# Patient Record
Sex: MALE | Race: WHITE | NOT HISPANIC OR LATINO | Employment: OTHER | URBAN - METROPOLITAN AREA
[De-identification: names, ages, dates, MRNs, and addresses within clinical notes are randomized per-mention and may not be internally consistent; named-entity substitution may affect disease eponyms.]

---

## 2017-09-28 ENCOUNTER — GENERIC CONVERSION - ENCOUNTER (OUTPATIENT)
Dept: OTHER | Facility: OTHER | Age: 82
End: 2017-09-28

## 2017-10-02 ENCOUNTER — TRANSCRIBE ORDERS (OUTPATIENT)
Dept: LAB | Facility: MEDICAL CENTER | Age: 82
End: 2017-10-02

## 2017-10-02 ENCOUNTER — ALLSCRIPTS OFFICE VISIT (OUTPATIENT)
Dept: OTHER | Facility: OTHER | Age: 82
End: 2017-10-02

## 2017-10-02 DIAGNOSIS — I10 ESSENTIAL (PRIMARY) HYPERTENSION: ICD-10-CM

## 2017-10-02 DIAGNOSIS — E78.5 HYPERLIPIDEMIA: ICD-10-CM

## 2017-10-03 NOTE — PROGRESS NOTES
Assessment  1  Chronic pain of right hip (719 45,338 29) (M25 551,G89 29)    Plan  Exercise counseling    · Benefits of Exercise/Physical Activity; Status:Complete - Retrospective By Protocol  Authorization;   Done: [id number]  Hip Pain Elicited By Active Abduction Bilaterally    · TraMADol HCl - 50 MG Oral Tablet  Hyperlipidemia    · (1) CBC/ PLT (NO DIFF); Status:Active; Requested YNY:[id number];    · (1) COMPREHENSIVE METABOLIC PANEL; Status:Active; Requested for:02Oct2017;    · (1) LIPID PANEL FASTING W DIRECT LDL REFLEX; Status:Active; Requested  DGN:44CCE1000;   Hypertension    · (1) TSH; Status:Active; Requested NLP:[id number];   Need for influenza vaccination    · Fluzone High-Dose 0 5 ML Intramuscular Suspension Prefilled Syringe  Screening for depression    · *VB-Depression Screening; Status:Complete - Retrospective By Protocol Authorization;    Done: 28ATQ2937 01:00PM  Screening for genitourinary condition    · *VB - Urinary Incontinence Screen (Dx Z13 89 Screen for UI); Status:Complete -  Retrospective By Protocol Authorization;   Done: 42QAZ4615 01:02PM  Special screening for other neurological conditions    · *VB - Fall Risk Assessment  (Dx Z13 89 Screen for Neurologic Disorder);  Status:Complete - Retrospective By Protocol Authorization;   Done: 26MFB0595 01:01PM    Discussion/Summary    I offered Lucian Bellamy an appointment with Dr Jayson Antoine to once again discuss hardware removal  He said he will think about it  He did get his flu vaccine today  I also gave him lab slips to get some updated blood work drawn, however he said if they lab cannot take him right away, he is leaving  The patient, patient's family was counseled regarding instructions for management,-risk factor reductions,-prognosis,-patient and family education,-risks and benefits of treatment options,-importance of compliance with treatment  Possible side effects of new medications were reviewed with the patient/guardian today   The treatment plan was reviewed with the patient/guardian  The patient/guardian understands and agrees with the treatment plan      Chief Complaint  Issac Malik is here today with complaints of R hip pain every day x 3 years  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 and was last seen by Dr Nadine Carcamo in 8/2016  At that time, Issac Malik was complaining about the R hip pain and Dr Nadine Carcamo suggested removing some hardware to see if he had improvement in pain  Issac Malik never had this done  He is here today for the same pain  He did not call Dr Nadine Carcamo since his appointment there 1 year ago  History of Present Illness  HPI: See chief complaint  Review of Systems    Constitutional: no fever-and-no chills  Cardiovascular: no chest pain-and-no palpitations  Respiratory: no shortness of breath  Gastrointestinal: no abdominal pain,-no constipation-and-no diarrhea  Genitourinary: no dysuria-and-no incontinence  Integumentary: chronic skin lesions, appear to be seborrheic keratosis, he says he follows with dermatology  -   The patient presents with complaints of constant episodes of severe bilateral scalp skin lesion, described as painless, raised, brown, black and tan  ROS reviewed  Active Problems  1  Abscess of face (682 0) (L02 01)   2  Arthritis of knee (716 96) (M17 10)   3  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 (440 23,707 9) (I70 25)   4  Atypical chest pain (786 59) (R07 89)   5  Avascular necrosis of hip, right (733 42) (M87 051)   6  Bursitis of hip, right (726 5) (M70 71)   7  Closed intracapsular fracture of femur (820 00) (S72 019A)   8  Decubitus ulcer of right heel, stage 3 (707 07,707 23) (L89 613)   9  Edema (782 3) (R60 9)   10  Exercise counseling (V65 41) (Z71 82)   11  Gout (274 9) (M10 9)   12  Hip Pain Elicited By Active Abduction Bilaterally   13  Hip pain, right (719 45) (M25 551)   14  Hyperlipidemia (272 4) (E78 5)   15  Hypertension (401 9) (I10)   16   Mechanical complication of internal orthopedic device, implant or graft, initial encounter    (996 40) (T84 498A)   17  Need for influenza vaccination (V04 81) (Z23)   18  Painful orthopaedic hardware (996 78) (T84 84XA)   19  Screening for depression (V79 0) (Z13 89)   20  Screening for genitourinary condition (V81 6) (Z13 89)   21  Special screening for other neurological conditions (V80 09) (Z13 89)   22  Stage I Pressure Ulcer (707 21)   23  Stage I Pressure Ulcer Of The Left Heel (707 07)   24  Unstageable pressure ulcer of heel (106 07,814 47) (L89 600)    Past Medical History  1  History of Chronic kidney disease, stage 3 (585 3) (N18 3)   2  History of Fracture Of The Right Hip (820 8)   3  History of Glaucoma screening (V80 1) (Z13 5)  Active Problems And Past Medical History Reviewed: The active problems and past medical history were reviewed and updated today  Family History  Father    1  Family history of Type 2 diabetes mellitus (250 00) (E11 9)  Family History Reviewed: The family history was reviewed and updated today  Social History   · Former smoker (C05 47) (T82 310)   · Never Drank Alcohol   · Patient has living will (V49 89) (Z78 9)   · Retired From Work  The social history was reviewed and updated today  The social history was reviewed and is unchanged  Surgical History  1  History of Cataract Surgery   2  History of Cath Stent 1 Gradient   3  History of Colostomy   4  History of Colostomy Revision   5  History of Hernia Repair   6  History of Hip Surgery   7  History of Pacemaker - Pulse Generator Replacement   8  History of Pacemaker Placement   9  History of PTA Femoral-Popliteal   10  History of Treatment Of Wrist Fracture  Surgical History Reviewed: The surgical history was reviewed and updated today  Current Meds   1  Aspirin 81 MG Oral Tablet Delayed Release; take 1 tablet daily Recorded   2   Lisinopril-Hydrochlorothiazide 20-12 5 MG Oral Tablet; TAKE 1 TABLET DAILY; Therapy: 27Snx1234 to (Last Rx:77Gax2435)  Requested for: 48Eml4636 Ordered   3  Metoprolol Succinate ER 25 MG Oral Tablet Extended Release 24 Hour; take 1 tablet by   mouth once daily; Therapy: 63WAM5596 to (Last Rx:06Sep2017)  Requested for: 21PVR0464 Ordered   4  Pravastatin Sodium 40 MG Oral Tablet; TAKE ONE TABLET BY MOUTH AT BEDTIME; Therapy: 97IVJ4081 to (Last Rx:06Jun2017)  Requested for: 06Jun2017 Ordered   5  TraMADol HCl - 50 MG Oral Tablet; TAKE 1 TABLET Daily PRN for severe pain; Therapy: 29Nfv0270 to (Evaluate:13Sep2016); Last Rx:98Fib1779 Ordered    Allergies  1  No Known Drug Allergies    Vitals   Recorded: 30NPG4197 12:58PM   Temperature 98 3 F   Systolic 692, LUE, Sitting   Diastolic 68, LUE, Sitting   Height 5 ft 6 5 in   Weight 143 lb 8 0 oz   BMI Calculated 22 81   BSA Calculated 1 75     Physical Exam    Constitutional   General appearance: Abnormal   disheveled clothing-and-unkempt appearance  Ears, Nose, Mouth, and Throat   External inspection of ears and nose: Normal     Otoscopic examination: Tympanic membrance translucent with normal light reflex  Canals patent without erythema  Oropharynx: Normal with no erythema, edema, exudate or lesions  Pulmonary   Respiratory effort: No increased work of breathing or signs of respiratory distress  Auscultation of lungs: Clear to auscultation, equal breath sounds bilaterally, no wheezes, no rales, no rhonci  Cardiovascular   Auscultation of heart: Normal rate and rhythm, normal S1 and S2, without murmurs  Lymphatic   Palpation of lymph nodes in neck: No lymphadenopathy  Musculoskeletal patient refused R hip exam because his hip is painful  Skin   Skin and subcutaneous tissue: Normal without rashes or lesions      Psychiatric   Orientation to person, place and time: Normal     Mood and affect: Normal          Results/Data  *VB - Urinary Incontinence Screen (Dx Z13 89 Screen for UI) 36YVZ8943 01:02PM Travis Honey     Test Name Result Flag Reference   Urinary Incontinence Assessment      10/02/17-No urinary incontinence  Falls Risk Assessment (Dx Z13 89 Screen for Neurologic Disorder) 20MJB2666 01:01PM User, Vinis     Test Name Result Flag Reference   Falls Risk      No falls in the past year     *VB - Fall Risk Assessment  (Dx Z13 89 Screen for Neurologic Disorder) 02Oct2017 01:01PM Manuel Saavedra     Test Name Result Flag Reference   Falls Risk      No falls in the past year     PHQ-2 Adult Depression Screening 60QHC1230 01:00PM User, Ahs     Test Name Result Flag Reference   PHQ-2 Adult Depression Score 0     Over the last two weeks, how often have you been bothered by any of the following problems?   Little interest or pleasure in doing things: Not at all - 0  Feeling down, depressed, or hopeless: Not at all - 0   PHQ-2 Adult Depression Screening Negative       *VB-Depression Screening 32ZXF9262 01:00PM Manuel Saavedra     Test Name Result Flag Reference   Depression Scale Result      Depression Screen - Negative For Symptoms       Signatures   Electronically signed by : Barbara Schofield, [facility]; Oct  2 2017  1:50PM EST                       (Author)    Electronically signed by : Susi Feliciano DO; Oct  2 2017  3:58PM EST                       (Author)

## 2018-01-14 VITALS
BODY MASS INDEX: 22.52 KG/M2 | DIASTOLIC BLOOD PRESSURE: 68 MMHG | SYSTOLIC BLOOD PRESSURE: 116 MMHG | HEIGHT: 67 IN | WEIGHT: 143.5 LBS | TEMPERATURE: 96.7 F

## 2018-01-18 NOTE — MISCELLANEOUS
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 (440 23,707 9) (I70 25)   4  Atypical chest pain (786 59) (R07 89)   5  Avascular necrosis of hip, right (733 42) (M87 051)   6  Bursitis of hip, right (726 5) (M70 71)   7  Closed intracapsular fracture of femur (820 00) (S72 019A)   8  Decubitus ulcer of right heel, stage 3 (707 07,707 23) (L89 613)   9  Edema (782 3) (R60 9)   10  Gout (274 9) (M10 9)   11  Hip Pain Elicited By Active Abduction Bilaterally   12  Hip pain, right (719 45) (M25 551)   13  Hyperlipidemia (272 4) (E78 5)   14  Hypertension (401 9) (I10)   15  Mechanical complication of internal orthopedic device, implant or graft, initial encounter    (996 40) (T84 498A)   16  Need for influenza vaccination (V04 81) (Z23)   17  Painful orthopaedic hardware (996 78) (T84 84XA)   18  Screening for genitourinary condition (V81 6) (Z13 89)   19  Stage I Pressure Ulcer (707 21)   20  Stage I Pressure Ulcer Of The Left Heel (707 07)   21  Unstageable pressure ulcer of heel (707 07,707 25) (L89 600)    Current Meds   1  Aspirin 81 MG Oral Tablet Delayed Release; take 1 tablet daily Recorded   2  Lisinopril-Hydrochlorothiazide 20-12 5 MG Oral Tablet; TAKE 1 TABLET DAILY; Therapy: 22Cdc7676 to (Last BP:28AIK1492)  Requested for: 02JMS6333 Ordered   3  Metoprolol Succinate ER 25 MG Oral Tablet Extended Release 24 Hour; take 1 tablet by   mouth once daily; Therapy: 75YOB2362 to (Last QS:37PCA0100)  Requested for: 16QVS8083 Ordered   4  Pravastatin Sodium 40 MG Oral Tablet; TAKE ONE TABLET BY MOUTH AT BEDTIME; Therapy: 24PAR4180 to (Last GD:90YUL4898)  Requested for: 87CEB8129 Ordered   5  TraMADol HCl - 50 MG Oral Tablet; TAKE 1 TABLET Daily PRN for severe pain; Therapy: 29Aug2016 to (Evaluate:40Fnr5336);  Last Rx:29Aug2016 Ordered    Allergies    1   No Known Drug Allergies    Signatures   Electronically signed by : Audrey Avelar, ; Oct 10 2016  1:18PM EST                       (Author)

## 2018-04-27 ENCOUNTER — OFFICE VISIT (OUTPATIENT)
Dept: OBGYN CLINIC | Facility: CLINIC | Age: 83
End: 2018-04-27
Payer: COMMERCIAL

## 2018-04-27 ENCOUNTER — APPOINTMENT (OUTPATIENT)
Dept: RADIOLOGY | Facility: MEDICAL CENTER | Age: 83
End: 2018-04-27
Payer: COMMERCIAL

## 2018-04-27 VITALS
HEART RATE: 73 BPM | SYSTOLIC BLOOD PRESSURE: 146 MMHG | HEIGHT: 69 IN | BODY MASS INDEX: 22.07 KG/M2 | WEIGHT: 149 LBS | DIASTOLIC BLOOD PRESSURE: 68 MMHG

## 2018-04-27 DIAGNOSIS — M25.551 PAIN IN RIGHT HIP: ICD-10-CM

## 2018-04-27 DIAGNOSIS — Z98.890 STATUS POST HIP SURGERY: Primary | ICD-10-CM

## 2018-04-27 PROCEDURE — 73502 X-RAY EXAM HIP UNI 2-3 VIEWS: CPT

## 2018-04-27 PROCEDURE — 99203 OFFICE O/P NEW LOW 30 MIN: CPT | Performed by: ORTHOPAEDIC SURGERY

## 2018-04-27 PROCEDURE — 20610 DRAIN/INJ JOINT/BURSA W/O US: CPT | Performed by: ORTHOPAEDIC SURGERY

## 2018-04-27 RX ORDER — PRAVASTATIN SODIUM 40 MG
1 TABLET ORAL
COMMUNITY
Start: 2014-05-23 | End: 2018-07-03 | Stop reason: SDUPTHER

## 2018-04-27 RX ORDER — LISINOPRIL AND HYDROCHLOROTHIAZIDE 20; 12.5 MG/1; MG/1
1 TABLET ORAL DAILY
COMMUNITY
Start: 2015-04-13 | End: 2018-05-01 | Stop reason: SDUPTHER

## 2018-04-27 RX ORDER — BUPIVACAINE HYDROCHLORIDE 2.5 MG/ML
4 INJECTION, SOLUTION INFILTRATION; PERINEURAL
Status: COMPLETED | OUTPATIENT
Start: 2018-04-27 | End: 2018-04-27

## 2018-04-27 RX ORDER — BETAMETHASONE SODIUM PHOSPHATE AND BETAMETHASONE ACETATE 3; 3 MG/ML; MG/ML
6 INJECTION, SUSPENSION INTRA-ARTICULAR; INTRALESIONAL; INTRAMUSCULAR; SOFT TISSUE
Status: COMPLETED | OUTPATIENT
Start: 2018-04-27 | End: 2018-04-27

## 2018-04-27 RX ORDER — GENTAMICIN SULFATE 1 MG/G
OINTMENT TOPICAL 3 TIMES DAILY
COMMUNITY
End: 2018-08-29 | Stop reason: ALTCHOICE

## 2018-04-27 RX ORDER — METOPROLOL SUCCINATE 25 MG/1
1 TABLET, EXTENDED RELEASE ORAL DAILY
COMMUNITY
Start: 2014-05-23 | End: 2018-05-01 | Stop reason: SDUPTHER

## 2018-04-27 RX ORDER — CEPHALEXIN 250 MG/1
500 CAPSULE ORAL EVERY 8 HOURS SCHEDULED
COMMUNITY
End: 2018-08-29 | Stop reason: ALTCHOICE

## 2018-04-27 RX ADMIN — BUPIVACAINE HYDROCHLORIDE 4 ML: 2.5 INJECTION, SOLUTION INFILTRATION; PERINEURAL at 11:33

## 2018-04-27 RX ADMIN — BETAMETHASONE SODIUM PHOSPHATE AND BETAMETHASONE ACETATE 6 MG: 3; 3 INJECTION, SUSPENSION INTRA-ARTICULAR; INTRALESIONAL; INTRAMUSCULAR; SOFT TISSUE at 11:33

## 2018-04-27 NOTE — PROGRESS NOTES
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 years ago and was scheduled for surgery which was deferred for various reasons  Patient presents for evaluation with radiographs  Patient is still ambulating with a walker  Physical exam  Right hip incisions well healed  No hardware prominence    All movements of the right hip are painful    Imaging  Radiographs show collapse of the femoral head with the screws in the acetabulum    Procedure  Right hip fracture treated by cannulated screws avascular necrosis femoral head with hardware failure     Plan  Ideally the hardware should be removed and patient should undergo total hip arthroplasty  Patient is wife and his wife elected undergo surgery due to his advanced age and time and in agreement with them for this  Right hip joint injection with steroid with a spinal needle  Patient will come back in 3 months and let me know if he wants the screws removed as a temporizing measure  Large joint arthrocentesis  Date/Time: 4/27/2018 11:33 AM  Consent given by: patient and spouse  Site marked: site marked  Timeout: Immediately prior to procedure a time out was called to verify the correct patient, procedure, equipment, support staff and site/side marked as required   Supporting Documentation  Indications: pain and diagnostic evaluation   Procedure Details  Location: hip - R hip joint  Preparation: Patient was prepped and draped in the usual sterile fashion  Needle gauge: spinal   Ultrasound guidance: no  Approach: anterolateral  Medications administered: 4 mL bupivacaine 0 25 %; 6 mg betamethasone acetate-betamethasone sodium phosphate 6 (3-3) mg/mL    Patient tolerance: patient tolerated the procedure well with no immediate complications  Dressing:  Sterile dressing applied

## 2018-05-01 DIAGNOSIS — I10 ESSENTIAL HYPERTENSION: Primary | ICD-10-CM

## 2018-05-01 RX ORDER — LISINOPRIL AND HYDROCHLOROTHIAZIDE 20; 12.5 MG/1; MG/1
1 TABLET ORAL DAILY
Qty: 30 TABLET | Refills: 5 | Status: ON HOLD | OUTPATIENT
Start: 2018-05-01 | End: 2018-10-27

## 2018-05-01 RX ORDER — METOPROLOL SUCCINATE 25 MG/1
25 TABLET, EXTENDED RELEASE ORAL DAILY
Qty: 30 TABLET | Refills: 5 | Status: SHIPPED | OUTPATIENT
Start: 2018-05-01 | End: 2018-10-09 | Stop reason: SDUPTHER

## 2018-05-02 ENCOUNTER — TELEPHONE (OUTPATIENT)
Dept: OBGYN CLINIC | Facility: HOSPITAL | Age: 83
End: 2018-05-02

## 2018-05-02 DIAGNOSIS — Z87.81 S/P RIGHT HIP FRACTURE: Primary | ICD-10-CM

## 2018-05-02 RX ORDER — ACETAMINOPHEN AND CODEINE PHOSPHATE 300; 15 MG/1; MG/1
1 TABLET ORAL 3 TIMES DAILY PRN
Qty: 30 TABLET | Refills: 0 | Status: SHIPPED | OUTPATIENT
Start: 2018-05-02 | End: 2018-08-29 | Stop reason: ALTCHOICE

## 2018-05-02 NOTE — TELEPHONE ENCOUNTER
Patient calling to see if Dr Karin Moser can RX something for his pain to the pharmacy on file  Veronica Freeman

## 2018-05-02 NOTE — TELEPHONE ENCOUNTER
Tylenol No   2 called into the pharmacy  If patient needs more pain meds he  contact me either myself or his PCP

## 2018-05-02 NOTE — TELEPHONE ENCOUNTER
Patient sees Dr Elyse Mehta  He sees him for his hip  He left a message stating he would like a call back regarding his pain

## 2018-07-03 ENCOUNTER — APPOINTMENT (OUTPATIENT)
Dept: RADIOLOGY | Facility: MEDICAL CENTER | Age: 83
End: 2018-07-03
Payer: COMMERCIAL

## 2018-07-03 ENCOUNTER — OFFICE VISIT (OUTPATIENT)
Dept: OBGYN CLINIC | Facility: CLINIC | Age: 83
End: 2018-07-03
Payer: COMMERCIAL

## 2018-07-03 DIAGNOSIS — Z87.81 S/P RIGHT HIP FRACTURE: ICD-10-CM

## 2018-07-03 DIAGNOSIS — Z98.890 STATUS POST HIP SURGERY: ICD-10-CM

## 2018-07-03 DIAGNOSIS — E78.2 MIXED HYPERLIPIDEMIA: Primary | ICD-10-CM

## 2018-07-03 DIAGNOSIS — M87.00 AVN (AVASCULAR NECROSIS OF BONE) (HCC): ICD-10-CM

## 2018-07-03 DIAGNOSIS — M87.00 AVN (AVASCULAR NECROSIS OF BONE) (HCC): Primary | ICD-10-CM

## 2018-07-03 PROCEDURE — 73502 X-RAY EXAM HIP UNI 2-3 VIEWS: CPT

## 2018-07-03 PROCEDURE — 99213 OFFICE O/P EST LOW 20 MIN: CPT | Performed by: ORTHOPAEDIC SURGERY

## 2018-07-03 RX ORDER — PRAVASTATIN SODIUM 40 MG
TABLET ORAL
Qty: 30 TABLET | Refills: 0 | Status: SHIPPED | OUTPATIENT
Start: 2018-07-03 | End: 2018-08-02 | Stop reason: SDUPTHER

## 2018-07-03 NOTE — PROGRESS NOTES
Chief Complaint  Right hip pain    History Of Presenting Illness  Cale Brink 5/13/1921 presents with increasing right hip pain  Patient underwent open reduction internal fixation right hip fracture with cannulated screws April 17th, 2014  Since then fractures collapsed and the screws have penetrated into the hip joint  Patient was scheduled multiple times for screw removal but for whatever reason this has been cancelled on multiple occasions      Current Medications  Current Outpatient Prescriptions   Medication Sig Dispense Refill    acetaminophen-codeine (TYLENOL #2) 300-15 MG per tablet Take 1 tablet by mouth 3 (three) times a day as needed for moderate pain for up to 21 doses 30 tablet 0    aspirin 81 MG tablet Take 1 tablet by mouth daily      cephalexin (KEFLEX) 250 mg capsule Take 500 mg by mouth every 8 (eight) hours      gentamicin (GARAMYCIN) 0 1 % ointment Apply topically 3 (three) times a day      lisinopril-hydrochlorothiazide (PRINZIDE,ZESTORETIC) 20-12 5 MG per tablet Take 1 tablet by mouth daily 30 tablet 5    metoprolol succinate (TOPROL-XL) 25 mg 24 hr tablet Take 1 tablet (25 mg total) by mouth daily 30 tablet 5    pravastatin (PRAVACHOL) 40 mg tablet Take 1 tablet by mouth       No current facility-administered medications for this visit  Current Problems    Active Problems: There are no active problems to display for this patient          Review of Systems:    General: negative for - chills, fatigue, fever,  weight gain or weight loss  Psychological: negative for - anxiety, behavioral disorder, concentration difficulties    Past Medical History:   Past Medical History:   Diagnosis Date    Cancer ([address] )     skin    High cholesterol     Hypertension        Past Surgical History:   Past Surgical History:   Procedure Laterality Date    A-V CARDIAC PACEMAKER INSERTION      CATARACT EXTRACTION EXTRACAPSULAR W/ INTRAOCULAR LENS IMPLANTATION Bilateral     HEMORRHOID SURGERY      HERNIA REPAIR      bilat inguinal    JOINT REPLACEMENT      R hip    SKIN CANCER EXCISION         Family History:  Family history reviewed and non-contributory  Family History   Problem Relation Age of Onset    No Known Problems Mother     No Known Problems Father        Social History:  Social History     Social History    Marital status: /Civil Union     Spouse name: N/A    Number of children: N/A    Years of education: N/A     Social History Main Topics    Smoking status: Former Smoker    Smokeless tobacco: Never Used    Alcohol use No    Drug use: No    Sexual activity: Not on file     Other Topics Concern    Not on file     Social History Narrative    No narrative on file       Allergies: Allergies no known allergies        Physical ExaminationThere were no vitals taken for this visit  Gen: Alert and oriented to person, place, time  HEENT: EOMI, eyes clear, moist mucus membranes, hearing intact  Respiratory: Bilateral chest rise   No audible wheezing found  Cardiovascular: Regular Rate and Rhythm  Abdomen: soft nontender/nondistended    Orthopedic Exam  Right hip incisions healed  All movements of the right hip painful          Impression  Right hip pain due to avascular necrosis collapsed femoral head and prominent hardware in the hip joint        Plan    Discussed treatment with the patient and his wife  Ideally patient needs removal of hardware and hip replacement arthroplasty  At 89 I am not sure whether he is strong enough to undergo this procedure  Another  option is at least removal of the hardware which is prominent into the joint  Patient will talk about options with the son and PCP and let me know in 1-2 weeks    Rashad Medina MD        Portions of the record may have been created with voice recognition software   Occasional wrong word or "sound a like" substitutions may have occurred due to the inherent limitations of voice recognition software   Read the chart carefully and recognize, using context, where substitutions have occurred

## 2018-08-02 DIAGNOSIS — E78.2 MIXED HYPERLIPIDEMIA: ICD-10-CM

## 2018-08-02 RX ORDER — PRAVASTATIN SODIUM 40 MG
TABLET ORAL
Qty: 30 TABLET | Refills: 0 | Status: SHIPPED | OUTPATIENT
Start: 2018-08-02 | End: 2018-08-30 | Stop reason: SDUPTHER

## 2018-08-29 ENCOUNTER — OFFICE VISIT (OUTPATIENT)
Dept: FAMILY MEDICINE CLINIC | Facility: CLINIC | Age: 83
End: 2018-08-29
Payer: COMMERCIAL

## 2018-08-29 VITALS
BODY MASS INDEX: 20.73 KG/M2 | DIASTOLIC BLOOD PRESSURE: 56 MMHG | SYSTOLIC BLOOD PRESSURE: 118 MMHG | WEIGHT: 140 LBS | HEIGHT: 69 IN

## 2018-08-29 DIAGNOSIS — L23.0 NICKEL ALLERGY, CURRENT REACTION: Primary | ICD-10-CM

## 2018-08-29 PROCEDURE — 3725F SCREEN DEPRESSION PERFORMED: CPT | Performed by: FAMILY MEDICINE

## 2018-08-29 PROCEDURE — 1101F PT FALLS ASSESS-DOCD LE1/YR: CPT | Performed by: FAMILY MEDICINE

## 2018-08-29 PROCEDURE — 99214 OFFICE O/P EST MOD 30 MIN: CPT | Performed by: FAMILY MEDICINE

## 2018-08-29 RX ORDER — TRIAMCINOLONE ACETONIDE 1 MG/G
CREAM TOPICAL 2 TIMES DAILY
Qty: 15 G | Refills: 0 | Status: SHIPPED | OUTPATIENT
Start: 2018-08-29 | End: 2018-10-05

## 2018-08-29 NOTE — PROGRESS NOTES
Assessment/Plan:    No problem-specific Assessment & Plan notes found for this encounter  Diagnoses and all orders for this visit:    Nickel allergy, current reaction  -     triamcinolone (KENALOG) 0 1 % cream; Apply topically 2 (two) times a day          Subjective:      Patient ID: Sandie Alcantar is a 80 y o  male  Rash right wrist under wrist watch  Nickel allergy      Rash   Pertinent negatives include no cough, diarrhea, fatigue, fever, shortness of breath or vomiting  The following portions of the patient's history were reviewed and updated as appropriate:   He  has a past medical history of Cancer ([address] ); High cholesterol; and Hypertension  He There are no active problems to display for this patient  He  has a past surgical history that includes A-V cardiac pacemaker insertion; Skin cancer excision; Hemorrhoid surgery; Joint replacement; Cataract extraction, extracapsular w/ intraocular lens implant (Bilateral); and Hernia repair  His family history includes No Known Problems in his father and mother  He  reports that he has quit smoking  He has never used smokeless tobacco  He reports that he does not drink alcohol or use drugs  Current Outpatient Prescriptions   Medication Sig Dispense Refill    aspirin 81 MG tablet Take 1 tablet by mouth daily      lisinopril-hydrochlorothiazide (PRINZIDE,ZESTORETIC) 20-12 5 MG per tablet Take 1 tablet by mouth daily 30 tablet 5    metoprolol succinate (TOPROL-XL) 25 mg 24 hr tablet Take 1 tablet (25 mg total) by mouth daily 30 tablet 5    pravastatin (PRAVACHOL) 40 mg tablet TAKE ONE TABLET BY MOUTH AT BEDTIME 30 tablet 0    triamcinolone (KENALOG) 0 1 % cream Apply topically 2 (two) times a day 15 g 0     No current facility-administered medications for this visit        Current Outpatient Prescriptions on File Prior to Visit   Medication Sig    aspirin 81 MG tablet Take 1 tablet by mouth daily    lisinopril-hydrochlorothiazide (PRINZIDE,ZESTORETIC) 20-12 5 MG per tablet Take 1 tablet by mouth daily    metoprolol succinate (TOPROL-XL) 25 mg 24 hr tablet Take 1 tablet (25 mg total) by mouth daily    pravastatin (PRAVACHOL) 40 mg tablet TAKE ONE TABLET BY MOUTH AT BEDTIME    [DISCONTINUED] acetaminophen-codeine (TYLENOL #2) 300-15 MG per tablet Take 1 tablet by mouth 3 (three) times a day as needed for moderate pain for up to 21 doses (Patient not taking: Reported on 8/29/2018 )    [DISCONTINUED] cephalexin (KEFLEX) 250 mg capsule Take 500 mg by mouth every 8 (eight) hours    [DISCONTINUED] gentamicin (GARAMYCIN) 0 1 % ointment Apply topically 3 (three) times a day     No current facility-administered medications on file prior to visit  He has No Known Allergies       Review of Systems   Constitutional: Negative for activity change, appetite change, diaphoresis, fatigue and fever  HENT: Negative  Eyes: Negative  Respiratory: Negative for apnea, cough, chest tightness, shortness of breath and wheezing  Cardiovascular: Negative for chest pain, palpitations and leg swelling  Gastrointestinal: Negative for abdominal distention, abdominal pain, anal bleeding, constipation, diarrhea, nausea and vomiting  Endocrine: Negative for cold intolerance, heat intolerance, polydipsia, polyphagia and polyuria  Genitourinary: Negative for difficulty urinating, dysuria, flank pain, hematuria and urgency  Musculoskeletal: Negative for arthralgias, back pain, gait problem, joint swelling and myalgias  Skin: Positive for rash  Negative for color change and wound  Allergic/Immunologic: Negative for environmental allergies, food allergies and immunocompromised state  Neurological: Negative for dizziness, seizures, syncope, speech difficulty, numbness and headaches  Hematological: Negative for adenopathy  Does not bruise/bleed easily     Psychiatric/Behavioral: Negative for agitation, behavioral problems, hallucinations, sleep disturbance and suicidal ideas  Objective:      /56 (BP Location: Left arm, Patient Position: Sitting, Cuff Size: Standard)   Ht 5' 9" (1 753 m)   Wt 63 5 kg (140 lb)   BMI 20 67 kg/m²          Physical Exam   Constitutional: He is oriented to person, place, and time  He appears well-developed and well-nourished  No distress  HENT:   Head: Normocephalic  Right Ear: External ear normal    Left Ear: External ear normal    Nose: Nose normal    Mouth/Throat: Oropharynx is clear and moist    Eyes: Conjunctivae and EOM are normal  Pupils are equal, round, and reactive to light  Right eye exhibits no discharge  Left eye exhibits no discharge  No scleral icterus  Neck: Normal range of motion  No tracheal deviation present  No thyromegaly present  Cardiovascular: Normal rate, regular rhythm and normal heart sounds  Exam reveals no gallop and no friction rub  No murmur heard  Pulmonary/Chest: Effort normal and breath sounds normal  No respiratory distress  He has no wheezes  Abdominal: Soft  Bowel sounds are normal  He exhibits no mass  There is no tenderness  There is no guarding  Musculoskeletal: He exhibits no edema or deformity  Lymphadenopathy:     He has no cervical adenopathy  Neurological: He is alert and oriented to person, place, and time  No cranial nerve deficit  Skin: Skin is warm and dry  Rash noted  He is not diaphoretic  No erythema  Psychiatric: He has a normal mood and affect   Thought content normal

## 2018-08-30 DIAGNOSIS — E78.2 MIXED HYPERLIPIDEMIA: ICD-10-CM

## 2018-08-30 RX ORDER — PRAVASTATIN SODIUM 40 MG
TABLET ORAL
Qty: 30 TABLET | Refills: 0 | Status: SHIPPED | OUTPATIENT
Start: 2018-08-30 | End: 2018-09-15 | Stop reason: SDUPTHER

## 2018-09-15 DIAGNOSIS — E78.2 MIXED HYPERLIPIDEMIA: ICD-10-CM

## 2018-09-17 RX ORDER — PRAVASTATIN SODIUM 40 MG
TABLET ORAL
Qty: 30 TABLET | Refills: 0 | Status: SHIPPED | OUTPATIENT
Start: 2018-09-17 | End: 2018-10-09 | Stop reason: SDUPTHER

## 2018-10-04 ENCOUNTER — APPOINTMENT (OUTPATIENT)
Dept: LAB | Facility: MEDICAL CENTER | Age: 83
DRG: 603 | End: 2018-10-04
Payer: COMMERCIAL

## 2018-10-04 ENCOUNTER — OFFICE VISIT (OUTPATIENT)
Dept: FAMILY MEDICINE CLINIC | Facility: CLINIC | Age: 83
End: 2018-10-04
Payer: COMMERCIAL

## 2018-10-04 VITALS
HEIGHT: 69 IN | DIASTOLIC BLOOD PRESSURE: 64 MMHG | SYSTOLIC BLOOD PRESSURE: 124 MMHG | BODY MASS INDEX: 21.48 KG/M2 | WEIGHT: 145 LBS

## 2018-10-04 DIAGNOSIS — L03.119 CELLULITIS OF LOWER EXTREMITY, UNSPECIFIED LATERALITY: Primary | ICD-10-CM

## 2018-10-04 DIAGNOSIS — R60.9 DEPENDENT EDEMA: ICD-10-CM

## 2018-10-04 DIAGNOSIS — Z23 NEED FOR INFLUENZA VACCINATION: ICD-10-CM

## 2018-10-04 LAB
ANION GAP SERPL CALCULATED.3IONS-SCNC: 8 MMOL/L (ref 4–13)
BUN SERPL-MCNC: 50 MG/DL (ref 5–25)
CALCIUM SERPL-MCNC: 9.2 MG/DL (ref 8.3–10.1)
CHLORIDE SERPL-SCNC: 109 MMOL/L (ref 100–108)
CO2 SERPL-SCNC: 27 MMOL/L (ref 21–32)
CREAT SERPL-MCNC: 1.58 MG/DL (ref 0.6–1.3)
GFR SERPL CREATININE-BSD FRML MDRD: 36 ML/MIN/1.73SQ M
GLUCOSE SERPL-MCNC: 88 MG/DL (ref 65–140)
NT-PROBNP SERPL-MCNC: 2648 PG/ML
POTASSIUM SERPL-SCNC: 4.3 MMOL/L (ref 3.5–5.3)
SODIUM SERPL-SCNC: 144 MMOL/L (ref 136–145)
TSH SERPL DL<=0.05 MIU/L-ACNC: 2.54 UIU/ML (ref 0.36–3.74)

## 2018-10-04 PROCEDURE — G0008 ADMIN INFLUENZA VIRUS VAC: HCPCS

## 2018-10-04 PROCEDURE — 99214 OFFICE O/P EST MOD 30 MIN: CPT | Performed by: FAMILY MEDICINE

## 2018-10-04 PROCEDURE — 83880 ASSAY OF NATRIURETIC PEPTIDE: CPT | Performed by: FAMILY MEDICINE

## 2018-10-04 PROCEDURE — 36415 COLL VENOUS BLD VENIPUNCTURE: CPT | Performed by: FAMILY MEDICINE

## 2018-10-04 PROCEDURE — 90662 IIV NO PRSV INCREASED AG IM: CPT

## 2018-10-04 PROCEDURE — 80048 BASIC METABOLIC PNL TOTAL CA: CPT | Performed by: FAMILY MEDICINE

## 2018-10-04 PROCEDURE — 84443 ASSAY THYROID STIM HORMONE: CPT | Performed by: FAMILY MEDICINE

## 2018-10-04 RX ORDER — CEPHALEXIN 500 MG/1
500 CAPSULE ORAL EVERY 6 HOURS SCHEDULED
Qty: 40 CAPSULE | Refills: 0 | Status: SHIPPED | OUTPATIENT
Start: 2018-10-04 | End: 2018-10-08 | Stop reason: HOSPADM

## 2018-10-04 NOTE — PROGRESS NOTES
Assessment/Plan:  Draw a BMP/BNP/TSH and after these results have arrived patient may need loop diuretic patient was encouraged not to consume sodium and to elevate his legs on a recliner chair during the day    No problem-specific Assessment & Plan notes found for this encounter  Diagnoses and all orders for this visit:    Cellulitis of lower extremity, unspecified laterality  -     cephalexin (KEFLEX) 500 mg capsule; Take 1 capsule (500 mg total) by mouth every 6 (six) hours for 10 days    Dependent edema  -     Basic metabolic panel  -     NT-BNP PRO  -     TSH, 3rd generation with Free T4 reflex          Subjective:      Patient ID: Preston Mata is a 80 y o  male  Recent swelling of lower extremities yesterday patient was at the podiatrist he asked if we could squeeze min to are scheduled this week because of dependent edema he has +3 pitting edema up to the mid calf he has redness and induration of the skin a few areas of open blistering he does not elevate his legs during the day he sits with his legs down and therefore has a dependent edema he does take lisinopril hydrochlorothiazide but might need a loop diuretic he has no orthopnea or other signs of congestive heart failure but he does have a pacemaker        The following portions of the patient's history were reviewed and updated as appropriate:   He  has a past medical history of Cancer ([address] ); High cholesterol; and Hypertension  He There are no active problems to display for this patient  He  has a past surgical history that includes A-V cardiac pacemaker insertion; Skin cancer excision; Hemorrhoid surgery; Joint replacement; Cataract extraction, extracapsular w/ intraocular lens implant (Bilateral); and Hernia repair  His family history includes No Known Problems in his father and mother  He  reports that he has quit smoking  He has never used smokeless tobacco  He reports that he does not drink alcohol or use drugs    Current Outpatient Prescriptions   Medication Sig Dispense Refill    aspirin 81 MG tablet Take 1 tablet by mouth daily      lisinopril-hydrochlorothiazide (PRINZIDE,ZESTORETIC) 20-12 5 MG per tablet Take 1 tablet by mouth daily 30 tablet 5    metoprolol succinate (TOPROL-XL) 25 mg 24 hr tablet Take 1 tablet (25 mg total) by mouth daily 30 tablet 5    pravastatin (PRAVACHOL) 40 mg tablet TAKE ONE TABLET BY MOUTH AT BEDTIME 30 tablet 0    triamcinolone (KENALOG) 0 1 % cream Apply topically 2 (two) times a day 15 g 0    cephalexin (KEFLEX) 500 mg capsule Take 1 capsule (500 mg total) by mouth every 6 (six) hours for 10 days 40 capsule 0     No current facility-administered medications for this visit  Current Outpatient Prescriptions on File Prior to Visit   Medication Sig    aspirin 81 MG tablet Take 1 tablet by mouth daily    lisinopril-hydrochlorothiazide (PRINZIDE,ZESTORETIC) 20-12 5 MG per tablet Take 1 tablet by mouth daily    metoprolol succinate (TOPROL-XL) 25 mg 24 hr tablet Take 1 tablet (25 mg total) by mouth daily    pravastatin (PRAVACHOL) 40 mg tablet TAKE ONE TABLET BY MOUTH AT BEDTIME    triamcinolone (KENALOG) 0 1 % cream Apply topically 2 (two) times a day     No current facility-administered medications on file prior to visit  He has No Known Allergies       Review of Systems   Constitutional: Negative for activity change, appetite change, diaphoresis, fatigue and fever  HENT: Negative  Eyes: Negative  Respiratory: Negative for apnea, cough, chest tightness, shortness of breath and wheezing  Cardiovascular: Positive for leg swelling  Negative for chest pain and palpitations  Gastrointestinal: Negative for abdominal distention, abdominal pain, anal bleeding, constipation, diarrhea, nausea and vomiting  Endocrine: Negative for cold intolerance, heat intolerance, polydipsia, polyphagia and polyuria     Genitourinary: Negative for difficulty urinating, dysuria, flank pain, hematuria and urgency  Musculoskeletal: Negative for arthralgias, back pain, gait problem, joint swelling and myalgias  Skin: Negative for color change, rash and wound  Allergic/Immunologic: Negative for environmental allergies, food allergies and immunocompromised state  Neurological: Negative for dizziness, seizures, syncope, speech difficulty, numbness and headaches  Hematological: Negative for adenopathy  Does not bruise/bleed easily  Psychiatric/Behavioral: Negative for agitation, behavioral problems, hallucinations, sleep disturbance and suicidal ideas  Objective:      /64 (BP Location: Left arm, Patient Position: Sitting, Cuff Size: Standard)   Ht 5' 9" (1 753 m)   Wt 65 8 kg (145 lb)   BMI 21 41 kg/m²          Physical Exam   Musculoskeletal: He exhibits edema

## 2018-10-05 ENCOUNTER — APPOINTMENT (EMERGENCY)
Dept: RADIOLOGY | Facility: HOSPITAL | Age: 83
DRG: 603 | End: 2018-10-05
Payer: COMMERCIAL

## 2018-10-05 ENCOUNTER — APPOINTMENT (EMERGENCY)
Dept: ULTRASOUND IMAGING | Facility: HOSPITAL | Age: 83
DRG: 603 | End: 2018-10-05
Payer: COMMERCIAL

## 2018-10-05 ENCOUNTER — HOSPITAL ENCOUNTER (INPATIENT)
Facility: HOSPITAL | Age: 83
LOS: 3 days | Discharge: HOME HEALTH | DRG: 603 | End: 2018-10-08
Attending: EMERGENCY MEDICINE | Admitting: INTERNAL MEDICINE
Payer: COMMERCIAL

## 2018-10-05 DIAGNOSIS — R60.0 BILATERAL LEG EDEMA: ICD-10-CM

## 2018-10-05 DIAGNOSIS — R79.89 ELEVATED BRAIN NATRIURETIC PEPTIDE (BNP) LEVEL: Primary | ICD-10-CM

## 2018-10-05 DIAGNOSIS — L03.115 CELLULITIS OF RIGHT LOWER EXTREMITY: ICD-10-CM

## 2018-10-05 DIAGNOSIS — S37.009A KIDNEY INJURY: ICD-10-CM

## 2018-10-05 PROBLEM — Z86.79 HX OF CARDIAC MURMUR: Chronic | Status: ACTIVE | Noted: 2018-10-05

## 2018-10-05 PROBLEM — N17.9 AKI (ACUTE KIDNEY INJURY) (HCC): Status: ACTIVE | Noted: 2018-10-05

## 2018-10-05 PROBLEM — L03.90 CELLULITIS: Status: ACTIVE | Noted: 2018-10-05

## 2018-10-05 LAB
ALBUMIN SERPL BCP-MCNC: 3.2 G/DL (ref 3.5–5)
ALP SERPL-CCNC: 94 U/L (ref 46–116)
ALT SERPL W P-5'-P-CCNC: 13 U/L (ref 12–78)
ANION GAP SERPL CALCULATED.3IONS-SCNC: 7 MMOL/L (ref 4–13)
APTT PPP: 32 SECONDS (ref 24–36)
AST SERPL W P-5'-P-CCNC: 18 U/L (ref 5–45)
ATRIAL RATE: 75 BPM
BACTERIA UR QL AUTO: NORMAL /HPF
BASOPHILS # BLD AUTO: 0.05 THOUSANDS/ΜL (ref 0–0.1)
BASOPHILS NFR BLD AUTO: 1 % (ref 0–1)
BILIRUB SERPL-MCNC: 0.5 MG/DL (ref 0.2–1)
BILIRUB UR QL STRIP: NEGATIVE
BUN SERPL-MCNC: 47 MG/DL (ref 5–25)
CALCIUM SERPL-MCNC: 8.8 MG/DL (ref 8.3–10.1)
CHLORIDE SERPL-SCNC: 110 MMOL/L (ref 100–108)
CLARITY UR: CLEAR
CO2 SERPL-SCNC: 30 MMOL/L (ref 21–32)
COLOR UR: YELLOW
CREAT SERPL-MCNC: 1.53 MG/DL (ref 0.6–1.3)
EOSINOPHIL # BLD AUTO: 0.14 THOUSAND/ΜL (ref 0–0.61)
EOSINOPHIL NFR BLD AUTO: 2 % (ref 0–6)
ERYTHROCYTE [DISTWIDTH] IN BLOOD BY AUTOMATED COUNT: 14 % (ref 11.6–15.1)
GFR SERPL CREATININE-BSD FRML MDRD: 38 ML/MIN/1.73SQ M
GLUCOSE SERPL-MCNC: 84 MG/DL (ref 65–140)
GLUCOSE UR STRIP-MCNC: NEGATIVE MG/DL
HCT VFR BLD AUTO: 41.1 % (ref 36.5–49.3)
HGB BLD-MCNC: 12.8 G/DL (ref 12–17)
HGB UR QL STRIP.AUTO: ABNORMAL
IMM GRANULOCYTES # BLD AUTO: 0.02 THOUSAND/UL (ref 0–0.2)
IMM GRANULOCYTES NFR BLD AUTO: 0 % (ref 0–2)
INR PPP: 1.15 (ref 0.86–1.17)
KETONES UR STRIP-MCNC: NEGATIVE MG/DL
LEUKOCYTE ESTERASE UR QL STRIP: NEGATIVE
LYMPHOCYTES # BLD AUTO: 0.83 THOUSANDS/ΜL (ref 0.6–4.47)
LYMPHOCYTES NFR BLD AUTO: 12 % (ref 14–44)
MAGNESIUM SERPL-MCNC: 2.1 MG/DL (ref 1.6–2.6)
MCH RBC QN AUTO: 29.6 PG (ref 26.8–34.3)
MCHC RBC AUTO-ENTMCNC: 31.1 G/DL (ref 31.4–37.4)
MCV RBC AUTO: 95 FL (ref 82–98)
MONOCYTES # BLD AUTO: 0.78 THOUSAND/ΜL (ref 0.17–1.22)
MONOCYTES NFR BLD AUTO: 11 % (ref 4–12)
NEUTROPHILS # BLD AUTO: 5.04 THOUSANDS/ΜL (ref 1.85–7.62)
NEUTS SEG NFR BLD AUTO: 74 % (ref 43–75)
NITRITE UR QL STRIP: NEGATIVE
NON-SQ EPI CELLS URNS QL MICRO: NORMAL /HPF
NRBC BLD AUTO-RTO: 0 /100 WBCS
NT-PROBNP SERPL-MCNC: 2050 PG/ML
P AXIS: 87 DEGREES
PH UR STRIP.AUTO: 5.5 [PH] (ref 4.5–8)
PLATELET # BLD AUTO: 227 THOUSANDS/UL (ref 149–390)
PLATELET # BLD AUTO: 239 THOUSANDS/UL (ref 149–390)
PMV BLD AUTO: 11.1 FL (ref 8.9–12.7)
PMV BLD AUTO: 11.5 FL (ref 8.9–12.7)
POTASSIUM SERPL-SCNC: 4.1 MMOL/L (ref 3.5–5.3)
PR INTERVAL: 174 MS
PROT SERPL-MCNC: 6.6 G/DL (ref 6.4–8.2)
PROT UR STRIP-MCNC: NEGATIVE MG/DL
PROTHROMBIN TIME: 14.2 SECONDS (ref 11.8–14.2)
QRS AXIS: -64 DEGREES
QRSD INTERVAL: 152 MS
QT INTERVAL: 450 MS
QTC INTERVAL: 502 MS
RBC # BLD AUTO: 4.32 MILLION/UL (ref 3.88–5.62)
RBC #/AREA URNS AUTO: NORMAL /HPF
SODIUM SERPL-SCNC: 147 MMOL/L (ref 136–145)
SP GR UR STRIP.AUTO: 1.01 (ref 1–1.03)
T WAVE AXIS: 111 DEGREES
TROPONIN I SERPL-MCNC: 0.04 NG/ML
TSH SERPL DL<=0.05 MIU/L-ACNC: 3.72 UIU/ML (ref 0.36–3.74)
UROBILINOGEN UR QL STRIP.AUTO: 0.2 E.U./DL
VENTRICULAR RATE: 75 BPM
WBC # BLD AUTO: 6.86 THOUSAND/UL (ref 4.31–10.16)
WBC #/AREA URNS AUTO: NORMAL /HPF

## 2018-10-05 PROCEDURE — 85049 AUTOMATED PLATELET COUNT: CPT | Performed by: INTERNAL MEDICINE

## 2018-10-05 PROCEDURE — 83735 ASSAY OF MAGNESIUM: CPT | Performed by: PHYSICIAN ASSISTANT

## 2018-10-05 PROCEDURE — 83880 ASSAY OF NATRIURETIC PEPTIDE: CPT | Performed by: PHYSICIAN ASSISTANT

## 2018-10-05 PROCEDURE — 93005 ELECTROCARDIOGRAM TRACING: CPT

## 2018-10-05 PROCEDURE — 96375 TX/PRO/DX INJ NEW DRUG ADDON: CPT

## 2018-10-05 PROCEDURE — 99285 EMERGENCY DEPT VISIT HI MDM: CPT

## 2018-10-05 PROCEDURE — 84443 ASSAY THYROID STIM HORMONE: CPT | Performed by: INTERNAL MEDICINE

## 2018-10-05 PROCEDURE — 36415 COLL VENOUS BLD VENIPUNCTURE: CPT | Performed by: PHYSICIAN ASSISTANT

## 2018-10-05 PROCEDURE — 96374 THER/PROPH/DIAG INJ IV PUSH: CPT

## 2018-10-05 PROCEDURE — 80053 COMPREHEN METABOLIC PANEL: CPT | Performed by: PHYSICIAN ASSISTANT

## 2018-10-05 PROCEDURE — 85730 THROMBOPLASTIN TIME PARTIAL: CPT | Performed by: PHYSICIAN ASSISTANT

## 2018-10-05 PROCEDURE — 71045 X-RAY EXAM CHEST 1 VIEW: CPT

## 2018-10-05 PROCEDURE — 93971 EXTREMITY STUDY: CPT

## 2018-10-05 PROCEDURE — 85610 PROTHROMBIN TIME: CPT | Performed by: PHYSICIAN ASSISTANT

## 2018-10-05 PROCEDURE — 84484 ASSAY OF TROPONIN QUANT: CPT | Performed by: PHYSICIAN ASSISTANT

## 2018-10-05 PROCEDURE — 93010 ELECTROCARDIOGRAM REPORT: CPT | Performed by: INTERNAL MEDICINE

## 2018-10-05 PROCEDURE — 81001 URINALYSIS AUTO W/SCOPE: CPT | Performed by: PHYSICIAN ASSISTANT

## 2018-10-05 PROCEDURE — 99223 1ST HOSP IP/OBS HIGH 75: CPT | Performed by: INTERNAL MEDICINE

## 2018-10-05 PROCEDURE — 93971 EXTREMITY STUDY: CPT | Performed by: SURGERY

## 2018-10-05 PROCEDURE — 85025 COMPLETE CBC W/AUTO DIFF WBC: CPT | Performed by: PHYSICIAN ASSISTANT

## 2018-10-05 RX ORDER — PRAVASTATIN SODIUM 40 MG
40 TABLET ORAL
Status: DISCONTINUED | OUTPATIENT
Start: 2018-10-05 | End: 2018-10-08 | Stop reason: HOSPADM

## 2018-10-05 RX ORDER — FUROSEMIDE 10 MG/ML
40 INJECTION INTRAMUSCULAR; INTRAVENOUS ONCE
Status: COMPLETED | OUTPATIENT
Start: 2018-10-05 | End: 2018-10-05

## 2018-10-05 RX ORDER — HEPARIN SODIUM 5000 [USP'U]/ML
5000 INJECTION, SOLUTION INTRAVENOUS; SUBCUTANEOUS EVERY 8 HOURS SCHEDULED
Status: DISCONTINUED | OUTPATIENT
Start: 2018-10-05 | End: 2018-10-08 | Stop reason: HOSPADM

## 2018-10-05 RX ORDER — SENNOSIDES 8.6 MG
1 TABLET ORAL DAILY
Status: DISCONTINUED | OUTPATIENT
Start: 2018-10-06 | End: 2018-10-08 | Stop reason: HOSPADM

## 2018-10-05 RX ORDER — ONDANSETRON 2 MG/ML
4 INJECTION INTRAMUSCULAR; INTRAVENOUS EVERY 6 HOURS PRN
Status: DISCONTINUED | OUTPATIENT
Start: 2018-10-05 | End: 2018-10-08 | Stop reason: HOSPADM

## 2018-10-05 RX ORDER — DOCUSATE SODIUM 100 MG/1
100 CAPSULE, LIQUID FILLED ORAL 2 TIMES DAILY
Status: DISCONTINUED | OUTPATIENT
Start: 2018-10-05 | End: 2018-10-08 | Stop reason: HOSPADM

## 2018-10-05 RX ORDER — ACETAMINOPHEN 325 MG/1
650 TABLET ORAL EVERY 6 HOURS PRN
Status: DISCONTINUED | OUTPATIENT
Start: 2018-10-05 | End: 2018-10-08 | Stop reason: HOSPADM

## 2018-10-05 RX ORDER — TRAMADOL HYDROCHLORIDE 50 MG/1
50 TABLET ORAL EVERY 6 HOURS PRN
Status: DISCONTINUED | OUTPATIENT
Start: 2018-10-05 | End: 2018-10-08 | Stop reason: HOSPADM

## 2018-10-05 RX ORDER — ASPIRIN 81 MG/1
81 TABLET, CHEWABLE ORAL DAILY
Status: DISCONTINUED | OUTPATIENT
Start: 2018-10-06 | End: 2018-10-08 | Stop reason: HOSPADM

## 2018-10-05 RX ORDER — METOPROLOL SUCCINATE 25 MG/1
25 TABLET, EXTENDED RELEASE ORAL DAILY
Status: DISCONTINUED | OUTPATIENT
Start: 2018-10-06 | End: 2018-10-08 | Stop reason: HOSPADM

## 2018-10-05 RX ADMIN — TRAMADOL HYDROCHLORIDE 50 MG: 50 TABLET, FILM COATED ORAL at 17:39

## 2018-10-05 RX ADMIN — PRAVASTATIN SODIUM 40 MG: 40 TABLET ORAL at 22:55

## 2018-10-05 RX ADMIN — HEPARIN SODIUM 5000 UNITS: 5000 INJECTION, SOLUTION INTRAVENOUS; SUBCUTANEOUS at 22:55

## 2018-10-05 RX ADMIN — MORPHINE SULFATE 2 MG: 2 INJECTION, SOLUTION INTRAMUSCULAR; INTRAVENOUS at 12:10

## 2018-10-05 RX ADMIN — HEPARIN SODIUM 5000 UNITS: 5000 INJECTION, SOLUTION INTRAVENOUS; SUBCUTANEOUS at 17:10

## 2018-10-05 RX ADMIN — FUROSEMIDE 40 MG: 10 INJECTION, SOLUTION INTRAVENOUS at 11:39

## 2018-10-05 RX ADMIN — CEFAZOLIN SODIUM 1000 MG: 1 SOLUTION INTRAVENOUS at 17:11

## 2018-10-05 RX ADMIN — DOCUSATE SODIUM 100 MG: 100 CAPSULE, LIQUID FILLED ORAL at 17:10

## 2018-10-05 NOTE — ASSESSMENT & PLAN NOTE
Patient has bilateral lower extremity edema, right leg does have increased warmth, or tenderness to palpation, possible bacterial cellulitis present, start Ancef IV      Patient with history of cardiac murmur very limited physical exam difficult to auscultate heart sounds, check echocardiogram    Keep lower extremities elevated, right lower extremity Doppler was negative for DVT     Patient received IV Lasix in the emergency room, will continue to monitor, no further standing Lasix at this time

## 2018-10-05 NOTE — ASSESSMENT & PLAN NOTE
Check echocardiogram, medical chart was reviewed, I had seen the patient in 2014 and noted that he had a cardiac murmur consistent with possible aortic stenosis

## 2018-10-05 NOTE — ED NOTES
Pt aware of need for urine specimen   Unable to void at this time     Maynor Vickers RN  10/05/18 2795

## 2018-10-05 NOTE — PROGRESS NOTES
Please call the patient regarding his abnormal result   Call and speak to patient's son patient has congestive heart failure superimposed upon  Chronic kidney failure its going to be difficult to give him water pills and not worsen his kidney function patient needs a short stay in the hospital so that his kidney failure and his congestive heart failure can be evaluated and treated without damaging his kidneys any further if he can talk his dad in to going to the hospital I will call to try to arrange an admission

## 2018-10-05 NOTE — PLAN OF CARE
Problem: Potential for Falls  Goal: Patient will remain free of falls  INTERVENTIONS:  - Assess patient frequently for physical needs  -  Identify cognitive and physical deficits and behaviors that affect risk of falls    -  [place] fall precautions as indicated by assessment   - Educate patient/family on patient safety including physical limitations  - Instruct patient to call for assistance with activity based on assessment  - Modify environment to reduce risk of injury  - Consider OT/PT consult to assist with strengthening/mobility   Outcome: Progressing      Problem: Prexisting or High Potential for Compromised Skin Integrity  Goal: Skin integrity is maintained or improved  INTERVENTIONS:  - Identify patients at risk for skin breakdown  - Assess and monitor skin integrity  - Assess and monitor nutrition and hydration status  - Monitor labs (i e  albumin)  - Assess for incontinence   - Turn and reposition patient  - Assist with mobility/ambulation  - Relieve pressure over bony prominences  - Avoid friction and shearing  - Provide appropriate hygiene as needed including keeping skin clean and dry  - Evaluate need for skin moisturizer/barrier cream  - Collaborate with interdisciplinary team (i e  Nutrition, Rehabilitation, etc )   - Patient/family teaching   Outcome: Progressing      Problem: PAIN - ADULT  Goal: Verbalizes/displays adequate comfort level or baseline comfort level  Interventions:  - Encourage patient to monitor pain and request assistance  - Assess pain using appropriate pain scale  - Administer analgesics based on type and severity of pain and evaluate response  - Implement non-pharmacological measures as appropriate and evaluate response  - Consider cultural and social influences on pain and pain management  - Notify physician/advanced practitioner if interventions unsuccessful or patient reports new pain  Outcome: Progressing      Problem: INFECTION - ADULT  Goal: Absence or prevention of progression during hospitalization  INTERVENTIONS:  - Assess and monitor for signs and symptoms of infection  - Monitor lab/diagnostic results  - Monitor all insertion sites, i e  indwelling lines, tubes, and drains  - Monitor endotracheal (as able) and nasal secretions for changes in amount and color  - [place] appropriate cooling/warming therapies per order  - Administer medications as ordered  - Instruct and encourage patient and family to use good hand hygiene technique  - Identify and instruct in appropriate isolation precautions for identified infection/condition  Outcome: Progressing    Goal: Absence of fever/infection during neutropenic period  INTERVENTIONS:  - Monitor WBC  - Implement neutropenic guidelines  Outcome: Progressing      Problem: SAFETY ADULT  Goal: Maintain or return to baseline ADL function  INTERVENTIONS:  -  Assess patient's ability to carry out ADLs; assess patient's baseline for ADL function and identify physical deficits which impact ability to perform ADLs (bathing, care of mouth/teeth, toileting, grooming, dressing, etc )  - Assess/evaluate cause of self-care deficits   - Assess range of motion  - Assess patient's mobility; develop plan if impaired  - Assess patient's need for assistive devices and provide as appropriate  - Encourage maximum independence but intervene and supervise when necessary  ¯ Involve family in performance of ADLs  ¯ Assess for home care needs following discharge   ¯ Request OT consult to assist with ADL evaluation and planning for discharge  ¯ Provide patient education as appropriate  Outcome: Progressing    Goal: Maintain or return mobility status to optimal level  INTERVENTIONS:  - Assess patient's baseline mobility status (ambulation, transfers, stairs, etc )    - Identify cognitive and physical deficits and behaviors that affect mobility  - Identify mobility aids required to assist with transfers and/or ambulation (gait belt, sit-to-stand, lift, walker, cane, etc )  - [place] fall precautions as indicated by assessment  - Record patient progress and toleration of activity level on Mobility SBAR; progress patient to next Phase/Stage  - Instruct patient to call for assistance with activity based on assessment  - Request Rehabilitation consult to assist with strengthening/weightbearing, etc   Outcome: Progressing      Problem: DISCHARGE PLANNING  Goal: Discharge to home or other facility with appropriate resources  INTERVENTIONS:  - Identify barriers to discharge w/patient and caregiver  - Arrange for needed discharge resources and transportation as appropriate  - Identify discharge learning needs (meds, wound care, etc )  - Arrange for interpretive services to assist at discharge as needed  - Refer to Case Management Department for coordinating discharge planning if the patient needs post-hospital services based on physician/advanced practitioner order or complex needs related to functional status, cognitive ability, or social support system  Outcome: Progressing      Problem: Knowledge Deficit  Goal: Patient/family/caregiver demonstrates understanding of disease process, treatment plan, medications, and discharge instructions  Complete learning assessment and assess knowledge base    Interventions:  - Provide teaching at level of understanding  - Provide teaching via preferred learning methods  Outcome: Progressing      Problem: DISCHARGE PLANNING - CARE MANAGEMENT  Goal: Discharge to post-acute care or home with appropriate resources  INTERVENTIONS:  - Conduct assessment to determine patient/family and health care team treatment goals, and need for post-acute services based on payer coverage, community resources, and patient preferences, and barriers to discharge  - Address psychosocial, clinical, and financial barriers to discharge as identified in assessment in conjunction with the patient/family and health care team  - Arrange appropriate level of post-acute services according to patients   needs and preference and payer coverage in collaboration with the physician and health care team  - Communicate with and update the patient/family, physician, and health care team regarding progress on the discharge plan  - Arrange appropriate transportation to post-acute venues  Outcome: Progressing

## 2018-10-05 NOTE — ED PROCEDURE NOTE
Procedure  ECG 12 Lead Documentation  Date/Time: 10/5/2018 11:33 AM  Performed by: Peter Duron  Authorized by: Delfino ACEVEDO     Indications / Diagnosis:  Biilateral leg edema  ECG reviewed by me, the ED Provider: yes    Patient location:  ED  Interpretation:     Interpretation: normal    Rate:     ECG rate:  75    ECG rate assessment: normal    Rhythm:     Rhythm: paced    Pacing:     Capture:  Complete    Type of pacing:  AV                     Keya Noble PA-C  10/05/18 1134

## 2018-10-05 NOTE — ED PROVIDER NOTES
History  Chief Complaint   Patient presents with    Edema     SWELLING BOTH LEGS  SENT IN BY DR Tamra Homans   [facility]     Patient presents to the emergency department today via private vehicle with family  This is a very pleasant 43-year-old male with complaints of lower leg edema and redness  He states that he has noticed the edema over the last month or 2 however only redness over the last 2 weeks  He admits to some minor pain in the extremities  He specifically does denies shortness of breath or chest pain however  He denies fevers  No abdominal pain nausea or vomiting  He is overall well-appearing at bedside  Patient states no recent antibiotic use  Prior to Admission Medications   Prescriptions Last Dose Informant Patient Reported?  Taking?   aspirin 81 MG tablet   Yes Yes   Sig: Take 1 tablet by mouth daily   cephalexin (KEFLEX) 500 mg capsule   No No   Sig: Take 1 capsule (500 mg total) by mouth every 6 (six) hours for 10 days   lisinopril-hydrochlorothiazide (PRINZIDE,ZESTORETIC) 20-12 5 MG per tablet   No Yes   Sig: Take 1 tablet by mouth daily   metoprolol succinate (TOPROL-XL) 25 mg 24 hr tablet   No Yes   Sig: Take 1 tablet (25 mg total) by mouth daily   pravastatin (PRAVACHOL) 40 mg tablet   No Yes   Sig: TAKE ONE TABLET BY MOUTH AT BEDTIME      Facility-Administered Medications: None       Past Medical History:   Diagnosis Date    Arthritis of knee     last assessed 11/7/14, resolved 10/2/17     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 ([facility] [address] )     last assessed 9/9/14, resolved 10/2/17     Avascular necrosis of hip ([address] )     last assessed 8/9/16, resolved 10/2/17     Bursitis of hip, right     last assessed 8/12/16, resolved 10/2/17     Cancer ([address] )     skin    Chronic kidney disease, stage 3 ([address] )     Closed intracapsular fracture of femur ([address] )     last assessed 8/9/16, resolved 10/2/17    Fracture of right hip (HCC)     Gout     last assessed 12/24/13, resolved 10/2/17     High cholesterol     Hypertension        Past Surgical History:   Procedure Laterality Date    A-V CARDIAC PACEMAKER INSERTION  10/10/2012    CARDIAC PACEMAKER PLACEMENT  2005    CATARACT EXTRACTION EXTRACAPSULAR W/ INTRAOCULAR LENS IMPLANTATION Bilateral     COLON SURGERY      COLOSTOMY  1982    CORONARY ANGIOPLASTY WITH STENT PLACEMENT      stent 1 Gradient, RLE Anteriorogram Poss Plasty Stent     HEMORRHOID SURGERY      HERNIA REPAIR  1986    bilat inguinal    HIP SURGERY Right 04/17/2014    Pin placed in hip due to fracture Dr Pollard    JOINT REPLACEMENT      R hip    POPLITEAL ARTERY ANGIOPLASTY      Femoral - Popliteal     REVISION COLOSTOMY  1983    SKIN CANCER EXCISION      WRIST FRACTURE SURGERY  11/02/2011       Family History   Problem Relation Age of Onset    No Known Problems Mother     Diabetes type II Father      I have reviewed and agree with the history as documented  Social History   Substance Use Topics    Smoking status: Former Smoker    Smokeless tobacco: Never Used    Alcohol use No        Review of Systems   Constitutional: Negative  HENT: Negative  Eyes: Negative  Respiratory: Negative  Cardiovascular: Positive for leg swelling  Negative for chest pain and palpitations  Gastrointestinal: Negative  Endocrine: Negative  Genitourinary: Negative  Musculoskeletal: Negative  Skin:        Redness bilateral lower extremity   Allergic/Immunologic: Negative  Neurological: Negative  Hematological: Negative  Psychiatric/Behavioral: Negative  All other systems reviewed and are negative  Physical Exam  Physical Exam   Constitutional: He is oriented to person, place, and time  He appears well-developed and well-nourished  No distress  HENT:   Head: Normocephalic  Eyes: Pupils are equal, round, and reactive to light  EOM are normal    Neck: Normal range of motion  Cardiovascular: Normal rate    Exam reveals no gallop and no friction rub  No murmur heard  Pulmonary/Chest: Effort normal  No respiratory distress  He has no wheezes  He exhibits no tenderness  Patient does not appear to be in any acute distress  There is no wheezing via auscultation however crackles are noted at the bases bilaterally  No rhonchi   Abdominal: Soft  There is no tenderness  Musculoskeletal:   There is bilateral lower extremity edema noted +4  There is some disruption of the epidermis with some mild erythema  Neurological: He is alert and oriented to person, place, and time  Skin: Capillary refill takes less than 2 seconds  He is not diaphoretic  Epidermis disruption with erythema bilateral lower extremities  No proximal streaking  Psychiatric: He has a normal mood and affect  Vitals reviewed        Vital Signs  ED Triage Vitals [10/05/18 1031]   Temperature Pulse Respirations Blood Pressure SpO2   98 4 °F (36 9 °C) 71 14 117/55 100 %      Temp Source Heart Rate Source Patient Position - Orthostatic VS BP Location FiO2 (%)   Temporal Monitor Lying Left arm --      Pain Score       9           Vitals:    10/05/18 1031 10/05/18 1318   BP: 117/55    Pulse: 71 69   Patient Position - Orthostatic VS: Lying        Visual Acuity      ED Medications  Medications   furosemide (LASIX) injection 40 mg (40 mg Intravenous Given 10/5/18 1139)   morphine injection 2 mg (2 mg Intravenous Given 10/5/18 1210)       Diagnostic Studies  Results Reviewed     Procedure Component Value Units Date/Time    Urine Microscopic [[id number]]  (Normal) Collected:  10/05/18 1209    Lab Status:  Final result Specimen:  Urine from Urine, Clean Catch Updated:  10/05/18 1250     RBC, UA None Seen /hpf      WBC, UA None Seen /hpf      Epithelial Cells Occasional /hpf      Bacteria, UA None Seen /hpf     UA w Reflex to Microscopic [[id number]]  (Abnormal) Collected:  10/05/18 1209    Lab Status:  Final result Specimen:  Urine from Urine, Clean Catch Updated: 10/05/18 1221     Color, UA Yellow     Clarity, UA Clear     Specific Gravity, UA 1 015     pH, UA 5 5     Leukocytes, UA Negative     Nitrite, UA Negative     Protein, UA Negative mg/dl      Glucose, UA Negative mg/dl      Ketones, UA Negative mg/dl      Urobilinogen, UA 0 2 E U /dl      Bilirubin, UA Negative     Blood, UA Trace-Intact (A)    B-type natriuretic peptide [[id number]]  (Abnormal) Collected:  10/05/18 1139    Lab Status:  Final result Specimen:  Blood from Arm, Left Updated:  10/05/18 1210     NT-proBNP 2,050 (H) pg/mL     Comprehensive metabolic panel [[id number]]  (Abnormal) Collected:  10/05/18 1139    Lab Status:  Final result Specimen:  Blood from Arm, Left Updated:  10/05/18 1210     Sodium 147 (H) mmol/L      Potassium 4 1 mmol/L      Chloride 110 (H) mmol/L      CO2 30 mmol/L      ANION GAP 7 mmol/L      BUN 47 (H) mg/dL      Creatinine 1 53 (H) mg/dL      Glucose 84 mg/dL      Calcium 8 8 mg/dL      AST 18 U/L      ALT 13 U/L      Alkaline Phosphatase 94 U/L      Total Protein 6 6 g/dL      Albumin 3 2 (L) g/dL      Total Bilirubin 0 50 mg/dL      eGFR 38 ml/min/1 73sq m     Narrative:         National Kidney Disease Education Program recommendations are as follows:  GFR calculation is accurate only with a steady state creatinine  Chronic Kidney disease less than 60 ml/min/1 73 sq  meters  Kidney failure less than 15 ml/min/1 73 sq  meters      Magnesium [[id number]]  (Normal) Collected:  10/05/18 1139    Lab Status:  Final result Specimen:  Blood from Arm, Left Updated:  10/05/18 1210     Magnesium 2 1 mg/dL     Troponin I [[id number]]  (Normal) Collected:  10/05/18 1139    Lab Status:  Final result Specimen:  Blood from Arm, Left Updated:  10/05/18 1207     Troponin I 0 04 ng/mL     Protime-INR [[id number]]  (Normal) Collected:  10/05/18 1139    Lab Status:  Final result Specimen:  Blood from Arm, Left Updated:  10/05/18 1158     Protime 14 2 seconds      INR 1 15    APTT [[id number]]  (Normal) Collected:  10/05/18 1139    Lab Status:  Final result Specimen:  Blood from Arm, Left Updated:  10/05/18 1158     PTT 32 seconds     CBC and differential [[id number]]  (Abnormal) Collected:  10/05/18 1139    Lab Status:  Final result Specimen:  Blood from Arm, Left Updated:  10/05/18 1149     WBC 6 86 Thousand/uL      RBC 4 32 Million/uL      Hemoglobin 12 8 g/dL      Hematocrit 41 1 %      MCV 95 fL      MCH 29 6 pg      MCHC 31 1 (L) g/dL      RDW 14 0 %      MPV 11 1 fL      Platelets 780 Thousands/uL      nRBC 0 /100 WBCs      Neutrophils Relative 74 %      Immat GRANS % 0 %      Lymphocytes Relative 12 (L) %      Monocytes Relative 11 %      Eosinophils Relative 2 %      Basophils Relative 1 %      Neutrophils Absolute 5 04 Thousands/µL      Immature Grans Absolute 0 02 Thousand/uL      Lymphocytes Absolute 0 83 Thousands/µL      Monocytes Absolute 0 78 Thousand/µL      Eosinophils Absolute 0 14 Thousand/µL      Basophils Absolute 0 05 Thousands/µL                  XR chest 1 view portable   Final Result by Agustin Masters MD (10/05 1121)      No acute cardiopulmonary disease  Workstation performed: [id number]         VAS lower limb venous duplex study, unilateral/limited    (Results Pending)              Procedures  Procedures       Phone Contacts  ED Phone Contact    ED Course  ED Course as of Oct 05 1338   Fri Oct 05, 2018   1022 I did have a pleasure speaking with the patient's primary care provider called ahead prior to visit  Primary care provider states did see the patient yesterday for some open blisters and some swelling of the lower extremities  He did placed patient on outpatient antibiotics and did order some blood work  Upon review he noted a BUN of 50 and a creatinine 1 58 with a GFR of 30  Last blood work for 4 years ago showed a creatinine of 1 1  He noted a BNP over 2000  We do appreciate the call ahead and will be happy to take care of this patient      1128 FINDINGS:  Left-sided chest wall pacemaker is identified   Pacemaker leads are intact  Cardiomediastinal silhouette appears unremarkable  The lungs are clear   No pneumothorax or pleural effusion  Osseous structures appear within normal limits for patient age  Impression       No acute cardiopulmonary disease  1222 Limited evaluation of the right leg per ultrasound technologist reveals no evidence of DVT  Due to pain patient was not let the ultrasound technologist commence evaluation of the left leg    1303 Bacteria, UA: None Seen   1303 WBC, UA: None Seen   1316 Patient was re-evaluated at 1310 hours  He continues to do well  He appears to be volume overloaded the legs  Chest x-ray looks okay  BNP is quite elevated  There is a change in his baseline creatinine however I cannot necessarily say this is an acute finding    I also did evaluate a abscess on the left gluteal region however it is noted to be a stage I pressure ulcer that is not infected          HEART Risk Score      Most Recent Value   History  0 Filed at: 10/05/2018 1134   ECG  0 Filed at: 10/05/2018 1134   Age  2 Filed at: 10/05/2018 1134   Risk Factors  1 Filed at: 10/05/2018 1134   Troponin  0 Filed at: 10/05/2018 1134   Heart Score Risk Calculator   History  0 Filed at: 10/05/2018 1134   ECG  0 Filed at: 10/05/2018 1134   Age  2 Filed at: 10/05/2018 1134   Risk Factors  1 Filed at: 10/05/2018 1134   Troponin  0 Filed at: 10/05/2018 1134   HEART Score  3 Filed at: 10/05/2018 1134   HEART Score  3 Filed at: 10/05/2018 1134                            MDM  CritCare Time    Disposition  Final diagnoses:   Elevated brain natriuretic peptide (BNP) level   Bilateral leg edema   Kidney injury     Time reflects when diagnosis was documented in both MDM as applicable and the Disposition within this note     Time User Action Codes Description Comment    10/5/2018 12:26 PM Cheryle Legions D Add [R77 69] Elevated brain natriuretic peptide (BNP) level     10/5/2018 12:26 PM Kristina ACEVEDO Add [R60 0] Bilateral leg edema     10/5/2018  1:04 PM Mitesh Flores Add [S37 009A] Kidney injury       ED Disposition     ED Disposition Condition Comment    Admit  Case was discussed with Dr Carla Anaya and the patient's admission status was agreed to be Admission Status: observation status to the service of Dr Carla Anaya   Follow-up Information    None         Patient's Medications   Discharge Prescriptions    No medications on file     No discharge procedures on file      ED Provider  Electronically Signed by           Meghna Welch PA-C  10/05/18 3277

## 2018-10-05 NOTE — ASSESSMENT & PLAN NOTE
Possible right lower extremity cellulitis, give Ancef continue to monitor keep lower extremities elevated

## 2018-10-06 LAB
ALBUMIN SERPL BCP-MCNC: 3.5 G/DL (ref 3.5–5)
ALP SERPL-CCNC: 98 U/L (ref 46–116)
ALT SERPL W P-5'-P-CCNC: 12 U/L (ref 12–78)
ANION GAP SERPL CALCULATED.3IONS-SCNC: 11 MMOL/L (ref 4–13)
AST SERPL W P-5'-P-CCNC: 19 U/L (ref 5–45)
BILIRUB SERPL-MCNC: 0.6 MG/DL (ref 0.2–1)
BUN SERPL-MCNC: 47 MG/DL (ref 5–25)
CALCIUM SERPL-MCNC: 9.1 MG/DL (ref 8.3–10.1)
CHLORIDE SERPL-SCNC: 106 MMOL/L (ref 100–108)
CO2 SERPL-SCNC: 28 MMOL/L (ref 21–32)
CREAT SERPL-MCNC: 1.52 MG/DL (ref 0.6–1.3)
ERYTHROCYTE [DISTWIDTH] IN BLOOD BY AUTOMATED COUNT: 14.1 % (ref 11.6–15.1)
GFR SERPL CREATININE-BSD FRML MDRD: 38 ML/MIN/1.73SQ M
GLUCOSE SERPL-MCNC: 81 MG/DL (ref 65–140)
HCT VFR BLD AUTO: 43.5 % (ref 36.5–49.3)
HGB BLD-MCNC: 13.5 G/DL (ref 12–17)
MAGNESIUM SERPL-MCNC: 2 MG/DL (ref 1.6–2.6)
MCH RBC QN AUTO: 29.5 PG (ref 26.8–34.3)
MCHC RBC AUTO-ENTMCNC: 31 G/DL (ref 31.4–37.4)
MCV RBC AUTO: 95 FL (ref 82–98)
PHOSPHATE SERPL-MCNC: 3.8 MG/DL (ref 2.3–4.1)
PLATELET # BLD AUTO: 237 THOUSANDS/UL (ref 149–390)
PMV BLD AUTO: 12.2 FL (ref 8.9–12.7)
POTASSIUM SERPL-SCNC: 3.9 MMOL/L (ref 3.5–5.3)
PROT SERPL-MCNC: 7 G/DL (ref 6.4–8.2)
RBC # BLD AUTO: 4.58 MILLION/UL (ref 3.88–5.62)
SODIUM SERPL-SCNC: 145 MMOL/L (ref 136–145)
WBC # BLD AUTO: 6.44 THOUSAND/UL (ref 4.31–10.16)

## 2018-10-06 PROCEDURE — 99232 SBSQ HOSP IP/OBS MODERATE 35: CPT | Performed by: INTERNAL MEDICINE

## 2018-10-06 PROCEDURE — G8979 MOBILITY GOAL STATUS: HCPCS | Performed by: PHYSICAL THERAPIST

## 2018-10-06 PROCEDURE — G8987 SELF CARE CURRENT STATUS: HCPCS

## 2018-10-06 PROCEDURE — G8988 SELF CARE GOAL STATUS: HCPCS

## 2018-10-06 PROCEDURE — G8978 MOBILITY CURRENT STATUS: HCPCS | Performed by: PHYSICAL THERAPIST

## 2018-10-06 PROCEDURE — 97116 GAIT TRAINING THERAPY: CPT | Performed by: PHYSICAL THERAPIST

## 2018-10-06 PROCEDURE — 84100 ASSAY OF PHOSPHORUS: CPT | Performed by: INTERNAL MEDICINE

## 2018-10-06 PROCEDURE — 85027 COMPLETE CBC AUTOMATED: CPT | Performed by: INTERNAL MEDICINE

## 2018-10-06 PROCEDURE — 97530 THERAPEUTIC ACTIVITIES: CPT

## 2018-10-06 PROCEDURE — 83735 ASSAY OF MAGNESIUM: CPT | Performed by: INTERNAL MEDICINE

## 2018-10-06 PROCEDURE — 97167 OT EVAL HIGH COMPLEX 60 MIN: CPT

## 2018-10-06 PROCEDURE — 80053 COMPREHEN METABOLIC PANEL: CPT | Performed by: INTERNAL MEDICINE

## 2018-10-06 PROCEDURE — 97163 PT EVAL HIGH COMPLEX 45 MIN: CPT | Performed by: PHYSICAL THERAPIST

## 2018-10-06 RX ORDER — FUROSEMIDE 10 MG/ML
20 INJECTION INTRAMUSCULAR; INTRAVENOUS ONCE
Status: COMPLETED | OUTPATIENT
Start: 2018-10-06 | End: 2018-10-06

## 2018-10-06 RX ADMIN — METOPROLOL SUCCINATE 25 MG: 25 TABLET, EXTENDED RELEASE ORAL at 08:42

## 2018-10-06 RX ADMIN — CEFAZOLIN SODIUM 1000 MG: 1 SOLUTION INTRAVENOUS at 08:42

## 2018-10-06 RX ADMIN — HEPARIN SODIUM 5000 UNITS: 5000 INJECTION, SOLUTION INTRAVENOUS; SUBCUTANEOUS at 22:15

## 2018-10-06 RX ADMIN — SENNOSIDES 8.6 MG: 8.6 TABLET, FILM COATED ORAL at 08:41

## 2018-10-06 RX ADMIN — DOCUSATE SODIUM 100 MG: 100 CAPSULE, LIQUID FILLED ORAL at 08:42

## 2018-10-06 RX ADMIN — ASPIRIN 81 MG 81 MG: 81 TABLET ORAL at 08:42

## 2018-10-06 RX ADMIN — TRAMADOL HYDROCHLORIDE 50 MG: 50 TABLET, FILM COATED ORAL at 12:55

## 2018-10-06 RX ADMIN — CEFAZOLIN SODIUM 1000 MG: 1 SOLUTION INTRAVENOUS at 17:05

## 2018-10-06 RX ADMIN — CEFAZOLIN SODIUM 1000 MG: 1 SOLUTION INTRAVENOUS at 01:24

## 2018-10-06 RX ADMIN — HEPARIN SODIUM 5000 UNITS: 5000 INJECTION, SOLUTION INTRAVENOUS; SUBCUTANEOUS at 14:18

## 2018-10-06 RX ADMIN — LISINOPRIL: 20 TABLET ORAL at 08:41

## 2018-10-06 RX ADMIN — HEPARIN SODIUM 5000 UNITS: 5000 INJECTION, SOLUTION INTRAVENOUS; SUBCUTANEOUS at 06:33

## 2018-10-06 RX ADMIN — FUROSEMIDE 20 MG: 10 INJECTION, SOLUTION INTRAVENOUS at 11:10

## 2018-10-06 NOTE — ASSESSMENT & PLAN NOTE
Patient is increased swelling of bilateral lower extremities today, edema is dependent, keep lower extremities elevated, give 1 time dose of Lasix 20 mg IV today      Patient with history of cardiac murmur very limited physical exam/difficult to auscultate heart sounds, check echocardiogram    Keep lower extremities elevated, right lower extremity Doppler was negative for DVT     Suspected right lower extremity cellulitis, continue with IV Ancef

## 2018-10-06 NOTE — CASE MANAGEMENT
Initial Clinical Review    PLEASE NOTE: Patient Upgraded to INPT 10/5 @ [id number] from OBS 10/5 @ 1338 Due to acutely worsening lower extremity edema as well as severe right lower extremity pain, patient is going to require IV antibiotics as well as keeping his right lower extremity elevated, needs further evaluation including echocardiogram and daily monitoring of labs  Admission: Date/Time/Statement:   Start   Ordered   10/05/18 1611  Inpatient Admission Once     Transfer Service: General Medicine    Expected Discharge Date: 10/08/18       Question Answer Comment   Admitting Physician JAMAR LEYVA    Level of Care Med Surg    Estimated length of stay More than 2 Midnights    Certification I certify that inpatient services are medically necessary for this patient for a duration of greater than two midnights  See H&P and MD Progress Notes for additional information about the patient's course of treatment  10/05/18 1610       ED: Date/Time/Mode of Arrival:   ED Arrival Information     Expected Arrival Acuity Means of Arrival Escorted By Service Admission Type    - 10/5/2018 10:18 Urgent Walk-In Family Member General Medicine Urgent    Arrival Complaint    Leg Swelling        Chief Complaint:   Chief Complaint   Patient presents with    Edema     SWELLING BOTH LEGS  SENT IN BY DR Alli Louis FOR   CHF     History of Illness: Patient presents to the emergency department today via private vehicle with family  This is a very pleasant 80-year-old male with complaints of lower leg edema and redness  He states that he has noticed the edema over the last month or 2 however only redness over the last 2 weeks  He admits to some minor pain in the extremities  He specifically does denies shortness of breath or chest pain however  He denies fevers  No abdominal pain nausea or vomiting  crackles are noted at the bases bilaterally  bilateral lower extremity edema noted +4   There is some disruption of the epidermis with some mild erythema  ED Vital Signs:   ED Triage Vitals [10/05/18 1031]   Temperature Pulse Respirations Blood Pressure SpO2   98 4 °F (36 9 °C) 71 14 117/55 100 %      Temp Source Heart Rate Source Patient Position - Orthostatic VS BP Location FiO2 (%)   Temporal Monitor Lying Left arm --      Pain Score       9        Wt Readings from Last 1 Encounters:   10/06/18 62 4 kg (137 lb 9 1 oz)       Abnormal Labs/Diagnostic Test Results:   NTproBNP 2,050  Na 147,   Cl 110  BUN 47,   Cr 1 53    Urine: trace blood  CXR: No acute cardiopulmonary disease  EKG: AV dual-paced rhythm with occasional Premature ventricular complexes  RLE Duplex: No evidence of acute or chronic deep vein thrombosis in the common femoral  vein  femoral vein, popliteal vein and visualized posterior tibial veins  No evidence of superficial thrombophlebitis noted  ED Treatment:   Medication Administration from 10/05/2018 0928 to 10/05/2018 1438       Date/Time Order Dose Route Action Action by Comments     10/05/2018 1139 furosemide (LASIX) injection 40 mg 40 mg Intravenous Given       10/05/2018 1210 morphine injection 2 mg 2 mg Intravenous Given            Past Medical/Surgical History:   Past Medical History:   Diagnosis Date    Arthritis of knee     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 [address] )     Chronic kidney disease, stage 3 ([facility] [address] )     Closed intracapsular fracture of femur ([facility] [address] )     Fracture of right hip ([facility] [address] )     Gout     High cholesterol     Hypertension        Admitting Diagnosis: Kidney injury [S37 009A]  Leg swelling [M79 89]  Bilateral leg edema [R60 0]  Elevated brain natriuretic peptide (BNP) level [R79 89]    Age/Sex: 80 y o  male    Assessment/Plan:  81 yo male admitted with   Bilateral Lower Extremity Edema -right leg does have increased warmth, tenderness to palpation    with history of cardiac murmur very limited physical exam difficult to auscultate heart sounds, check echocardiogram   Keep lower extremities elevated, right lower extremity Doppler was negative for DVT    Patient received IV Lasix in the emergency room, will continue to monitor  ALEX - felecia unknown but in 2014 was 1 11  Cellulitis: give Ancef continue to monitor keep lower extremities elevated  Elevated BNP: Check echocardiogram     Admission Orders:  OBS to INPT 10/5  OOB as tolerated  PT / OT Eval  ECHO  CBC, CP, Mag, P{hos in am    Scheduled Meds:   Current Facility-Administered Medications:          aspirin 81 mg Oral Daily     cefazolin 1,000 mg Intravenous Q8H     docusate sodium 100 mg Oral BID     heparin (porcine) 5,000 Units Subcutaneous Q8H [facility]     lisinopril-hydrochlorothiazide (PRINZIDE 20/12  5) combo dose  Oral Daily     metoprolol succinate 25 mg Oral Daily             pneumococcal 13-valent conjugate vaccine 0 5 mL Intramuscular Prior to discharge     pravastatin 40 mg Oral HS                       Continuous Infusions:    PRN Meds:   acetaminophen    ondansetron    pneumococcal 13-valent conjugate vaccine    traMADol  X 1  And x 1  10/6  10/6: 96 8 - 70 - 18  138/60  BUN 47,  Cr 1 52  increased swelling of bilateral lower extremities today; c/o 5/10 LE pain  keep lower extremities elevated, give 1 time dose of Lasix 20 mg IV today  Suspected right lower extremity cellulitis, continue with IV Ancef  LASIX 20 IV X 1 ordered    Thank you,  [address]  Utilization Review Department  Phone: [phone]; Fax [phone]  ATTENTION: Please call with any questions or concerns to [phone]  and carefully follow the prompts so that you are directed to the right person  Send all requests for admission clinical reviews, approved or denied determinations and any other requests to fax [phone]   All voicemails are confidential

## 2018-10-06 NOTE — PROGRESS NOTES
Progress Note Idalia Hicks 5/13/1921, 80 y o  male MRN: [id number]    Unit/Bed#: 886-94 Encounter: [id number]    Primary Care Provider: Aramis Olson DO   Date and time admitted to hospital: 10/5/2018 10:25 AM        * Bilateral leg edema   Assessment & Plan    Patient is increased swelling of bilateral lower extremities today, edema is dependent, keep lower extremities elevated, give 1 time dose of Lasix 20 mg IV today  Patient with history of cardiac murmur very limited physical exam/difficult to auscultate heart sounds, check echocardiogram    Keep lower extremities elevated, right lower extremity Doppler was negative for DVT     Suspected right lower extremity cellulitis, continue with IV Ancef     ALEX (acute kidney injury) ([facility] [address] )   Assessment & Plan    Possible acute kidney injury, baseline creatinine is unknown  Creatinine in 2014 was 1 11    Suspected chronic kidney disease stage 3     Cellulitis   Assessment & Plan    Possible right lower extremity cellulitis, give Ancef continue to monitor keep lower extremities elevated  Elevated brain natriuretic peptide (BNP) level   Assessment & Plan    Check echocardiogram when available     Hx of cardiac murmur   Assessment & Plan    Check echocardiogram, medical chart was reviewed, I had seen the patient in 2014 and noted that he had a cardiac murmur consistent with possible aortic stenosis         VTE Pharmacologic Prophylaxis:   Pharmacologic: Heparin  Mechanical VTE Prophylaxis in Place: Yes    Patient Centered Rounds: I have performed bedside rounds with nursing staff today      Current Length of Stay: 1 day(s)    Current Patient Status: Inpatient   Certification Statement: The patient will continue to require additional inpatient hospital stay due to Severe lower extremity edema, suspected right lower extremity cellulitis    Discharge Plan / Estimated Discharge Date:  10/8/2018      Code Status: Level 1 - Full Code      Subjective:   No pain, leg pain is markedly improved today, patient had severe right lower extremity pain yesterday  Objective:     Vitals:   Temp (24hrs), Av 7 °F (35 9 °C), Min:96 3 °F (35 7 °C), Max:97 °F (36 1 °C)    HR:  [67-74] 70  Resp:  [18-90] 18  BP: (108-140)/(54-75) 138/60  SpO2:  [96 %-100 %] 97 %  Body mass index is 19 74 kg/m²  Input and Output Summary (last 24 hours): Intake/Output Summary (Last 24 hours) at 10/06/18 1049  Last data filed at 10/06/18 0825   Gross per 24 hour   Intake              900 ml   Output             1730 ml   Net             -830 ml       Physical Exam:     Physical Exam   Constitutional: He is oriented to person, place, and time  He appears well-developed and well-nourished  No distress  Cardiovascular: Normal rate and regular rhythm  Exam reveals no friction rub  No murmur heard  Pulmonary/Chest: Effort normal and breath sounds normal  No respiratory distress  He has no wheezes  Abdominal: Soft  Bowel sounds are normal  He exhibits no distension  There is no tenderness  Neurological: He is alert and oriented to person, place, and time  No cranial nerve deficit  Coordination normal    Skin: Skin is warm and dry  No rash noted  He is not diaphoretic  No erythema  Psychiatric: He has a normal mood and affect  His behavior is normal  Judgment and thought content normal    Nursing note and vitals reviewed  Additional Data:     Labs:      Results from last 7 days  Lab Units 10/06/18  0438  10/05/18  1139   WBC Thousand/uL 6 44  --  6 86   HEMOGLOBIN g/dL 13 5  --  12 8   HEMATOCRIT % 43 5  --  41 1   PLATELETS Thousands/uL 237  < > 239   NEUTROS PCT %  --   --  74   LYMPHS PCT %  --   --  12*   MONOS PCT %  --   --  11   EOS PCT %  --   --  2   < > = values in this interval not displayed      Results from last 7 days  Lab Units 10/06/18  0438   SODIUM mmol/L 145   POTASSIUM mmol/L 3 9   CHLORIDE mmol/L 106   CO2 mmol/L 28   BUN mg/dL 47*   CREATININE mg/dL 1 52* CALCIUM mg/dL 9 1   ALK PHOS U/L 98   ALT U/L 12   AST U/L 19       Results from last 7 days  Lab Units 10/05/18  1139   INR  1 15       * I Have Reviewed All Lab Data Listed Above  * Additional Pertinent Lab Tests Reviewed: [place] [facility] Admission Reviewed        Recent Cultures (last 7 days):           Last 24 Hours Medication List:     Current Facility-Administered Medications:  acetaminophen 650 mg Oral Q6H PRN Roberto Moulds, DO    aspirin 81 mg Oral Daily Roberto Moulds, DO    cefazolin 1,000 mg Intravenous Merrilyn Meo, DO Last Rate: 1,000 mg (10/06/18 4376)   docusate sodium 100 mg Oral BID Roberto Moulds, DO    furosemide 20 mg Intravenous Once Roberto Moulds, DO    heparin (porcine) 5,000 Units Subcutaneous [facility] Roberto Moyovanys, DO    lisinopril-hydrochlorothiazide (PRINZIDE 20/12  5) combo dose  Oral Daily Roberto Moulds, DO    metoprolol succinate 25 mg Oral Daily Roberto Moulds, DO    ondansetron 4 mg Intravenous Q6H PRN Roberto Moulds, DO    pneumococcal 13-valent conjugate vaccine 0 5 mL Intramuscular Prior to discharge Robertoannie Banks, DO    pravastatin 40 mg Oral HS Roberto Moyovanys, DO    senna 1 tablet Oral Daily Roberto Moulds, DO    traMADol 50 mg Oral Q6H PRN Roberto Moulds, DO         Today, Patient Was Seen By: Roberto Banks DO

## 2018-10-06 NOTE — SOCIAL WORK
Case management met with the patient to assess the prior level of function and form a safe d/c plan  The patient was given and I reviewed the case management  discharge checklist with the patient  The patient is aware that the d/c planning starts on the day of admission and that if he has any questions or needs they  is to contact case management  The patient is independent at home with adls and he lives with his wife  They live in a ranch style home one floor he uses a walker 100% of the time and he has not driven in 5 years  The patients son drives him to appointments  The pcp is Dr Jay Fan and he uses [facility] pharmacy for his medications

## 2018-10-06 NOTE — PLAN OF CARE
Problem: OCCUPATIONAL THERAPY ADULT  Goal: Performs self-care activities at highest level of function for planned discharge setting  See evaluation for individualized goals  Treatment Interventions: ADL retraining, Functional transfer training, UE strengthening/ROM, Endurance training, Patient/family training, Activityengagement          See flowsheet documentation for full assessment, interventions and recommendations  Limitation: Decreased ADL status, Decreased UE strength, Decreased endurance, Decreased self-care trans, Decreased high-level ADLs     Assessment: Pt is a 80 y o  male seen for OT evaluation s/p admit to [facility] on 10/5/2018 w/ Bilateral leg edema  Comorbidities affecting pt's functional performance at time of assessment include: elder  Personal factors affecting pt at time of IE include:difficulty performing ADLS, difficulty performing IADLS , decreased initiation and engagement  and sedentary lifestyle  Prior to admission, pt was (A) with ADLs/IADLs with use of RW during functional mobility  Upon evaluation: Pt requires min (A)-(S) level with transfers and functional mobility with increased (A) for ADL performance 2* the following deficits impacting occupational performance: weakness, decreased strength, decreased tolerance and impaired initiation  Pt to benefit from continued skilled OT tx while in the hospital to address deficits as defined above and maximize level of functional independence w ADL's and functional mobility  Occupational Performance areas to address include: grooming, bathing/shower, toilet hygiene, dressing, functional mobility, community mobility and clothing management  From OT standpoint, recommendation at time of d/c would be home with continued (A) from wife and son PRN       OT Discharge Recommendation: Home with family support

## 2018-10-06 NOTE — ASSESSMENT & PLAN NOTE
Possible acute kidney injury, baseline creatinine is unknown      Creatinine in 2014 was 1 11    Suspected chronic kidney disease stage 3

## 2018-10-06 NOTE — OCCUPATIONAL THERAPY NOTE
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 ([facility] [address] )     last assessed 9/9/14, resolved 10/2/17     Avascular necrosis of hip ([address] )     last assessed 8/9/16, resolved 10/2/17     Bursitis of hip, right     last assessed 8/12/16, resolved 10/2/17     Cancer ([facility] [address] )     skin    Chronic kidney disease, stage 3 ([facility] [address] )     Closed intracapsular fracture of femur ([facility] [address] )     last assessed 8/9/16, resolved 10/2/17    Fracture of right hip ([facility] [address] )     Gout     last assessed 12/24/13, resolved 10/2/17     High cholesterol     Hypertension      Past Surgical History  Past Surgical History:   Procedure Laterality Date    A-V CARDIAC PACEMAKER INSERTION  10/10/2012    CARDIAC PACEMAKER PLACEMENT  2005    CATARACT EXTRACTION EXTRACAPSULAR W/ INTRAOCULAR LENS IMPLANTATION Bilateral     COLON SURGERY      COLOSTOMY  1982    CORONARY ANGIOPLASTY WITH STENT PLACEMENT      stent 1 Gradient, RLE Anteriorogram Poss Plasty Stent     HEMORRHOID SURGERY      HERNIA REPAIR  1986    bilat inguinal    HIP SURGERY Right 04/17/2014    Pin placed in hip due to fracture Dr Pollard    JOINT REPLACEMENT      R hip    POPLITEAL ARTERY ANGIOPLASTY      Femoral - Popliteal     REVISION COLOSTOMY  1983    SKIN CANCER EXCISION      WRIST FRACTURE SURGERY  11/02/2011           10/06/18 0851   Note Type   Note type Eval/Treat   Restrictions/Precautions   Weight Bearing Precautions Per Order No   Other Precautions Chair Alarm;Multiple lines;Telemetry; Fall Risk   Pain Assessment   Pain Assessment No/denies pain   Home Living   Type of [address] One level;Performs ADLs on one level; Able to live on main level with bedroom/bathroom  (no JOSIAH)   Bathroom Shower/Tub Tub/shower unit   Bathroom Toilet Standard   Bathroom Equipment Shower chair;Grab bars in shower   [facility] Walker;Cane;Grab bars   Additional Comments pt reports use of RW at baseline; pt reports wife sponge bathes pt    Prior Function   Level of [place] Needs assistance with ADLs and functional mobility; Needs assistance with IADLs   Lives With Spouse   Receives Help From Family   ADL Assistance Needs assistance   IADLs Needs assistance   Falls in the last 6 months 0   Vocational Retired   Comments pt reports wife completes IADLs and son drives   Psychosocial   Psychosocial (WDL) WDL   Subjective   Subjective "they told me to keep the legs elevated"   ADL   Where Assessed Chair   LB Dressing Assistance 1  Total Assistance   LB Dressing Deficit Don/doff R sock; Don/doff L sock   Additional Comments pt's wife completes LB dressing at baseline    Bed Mobility   Additional Comments pt seated in chair at start and end of session   Transfers   Sit to Stand 4  Minimal assistance   Additional items Assist x 1; Increased time required;Verbal cues  (RW)   Stand to Sit 4  Minimal assistance   Additional items Assist x 1; Increased time required;Verbal cues  (RW)   Additional Comments pt performed initial transfer with increased shakiness and unsteady; pt able to self-correct and demonstrates good energy conservation techniques   Functional Mobility   Functional Mobility 5  Supervision   Additional Comments pt performed functional mobility in hallway with chair follow; pt performed ~100ft of functional mobility and reports "this is the most i have walked in 3 years" pt did not require rest break   Additional items Rolling walker   Balance   Static Sitting Fair +   Dynamic Sitting Fair + Static Standing Fair +   Dynamic Standing Fair   Ambulatory Fair   Activity Tolerance   Activity Tolerance Patient tolerated treatment well;Patient limited by fatigue   RUE Assessment   RUE Assessment WFL   LUE Assessment   LUE Assessment WFL   Hand Function   Gross Motor Coordination Functional   Fine Motor Coordination Functional   Sensation   Light Touch No apparent deficits   Sharp/Dull No apparent deficits   Cognition   Overall Cognitive Status WFL   Arousal/Participation Alert   Attention Within functional limits   Orientation Level Oriented X4   Memory Within functional limits   Following Commands Follows all commands and directions without difficulty   Assessment   Limitation Decreased ADL status; Decreased UE strength;Decreased endurance;Decreased self-care trans;Decreased high-level ADLs   Assessment Pt is a 80 y o  male seen for OT evaluation s/p admit to [facility] on 10/5/2018 w/ Bilateral leg edema  Comorbidities affecting pt's functional performance at time of assessment include: elder  Personal factors affecting pt at time of IE include:difficulty performing ADLS, difficulty performing IADLS , decreased initiation and engagement  and sedentary lifestyle  Prior to admission, pt was (A) with ADLs/IADLs with use of RW during functional mobility  Upon evaluation: Pt requires min (A)-(S) level with transfers and functional mobility with increased (A) for ADL performance 2* the following deficits impacting occupational performance: weakness, decreased strength, decreased tolerance and impaired initiation  Pt to benefit from continued skilled OT tx while in the hospital to address deficits as defined above and maximize level of functional independence w ADL's and functional mobility  Occupational Performance areas to address include: grooming, bathing/shower, toilet hygiene, dressing, functional mobility, community mobility and clothing management   From OT standpoint, recommendation at time of d/c would be home with continued (A) from wife and son PRN  Goals   Patient Goals to go home    Short Term Goal  pt will perform and tolerate UE strengthening and ROM exercises while seated in chair or EOB   Long Term Goal #1 pt will increase independence with functional mobility with use of RW to mod (I) level with increased endurance    Long Term Goal #2 pt will demonstrate UB bathing and grooming tasks at min (A) level while seated in chair or EOB   Long Term Goal pt will demonstrate toilet transfers and toilet hygiene at (S) level   Plan   Treatment Interventions ADL retraining;Functional transfer training;UE strengthening/ROM; Endurance training;Patient/family training; Activityengagement   Goal Expiration Date 10/20/18   OT Frequency 3-5x/wk   Recommendation   OT Discharge Recommendation Home with family support   Barthel Index   Feeding 10   Bathing 0   Grooming Score 0   Dressing Score 5   Bladder Score 10   Bowels Score 10   Toilet Use Score 5   Transfers (Bed/Chair) Score 10   Mobility (Level Surface) Score 10   Stairs Score 0   Barthel Index Score 60     Pt will benefit from continued OT services in order to maximize (I) c ADL performance, FM c RW, and improve overall endurance/strength required to complete functional tasks in preparation for d/c  Pt left seated in chair at end of session; all needs within reach; all lines intact; scds connected and turned on

## 2018-10-06 NOTE — PHYSICAL THERAPY NOTE
PT Evaluation        10/06/18 0826   Home Living   Additional Comments See OT Evaluation for details    Prior Function   Comments See OT Evaluation for details    Restrictions/Precautions   Weight Bearing Precautions Per Order No   Other Precautions Fall Risk;Multiple lines;Telemetry   General   Family/Caregiver Present No   Cognition   Overall Cognitive Status WFL   Arousal/Participation Alert   Orientation Level Oriented X4   Memory Within functional limits   Following Commands Follows all commands and directions without difficulty   RLE Assessment   RLE Assessment WFL   LLE Assessment   LLE Assessment WFL   Bed Mobility   Additional Comments Pt seated in chait at the beginning of the session   Transfers   Sit to Stand 4  Minimal assistance   Additional items Assist x 1; Increased time required   Stand to Sit 5  Supervision   Additional items Assist x 1   Additional Comments All Transfers perfromed with RW    Ambulation/Elevation   Gait pattern (Decreased Loren, velocity, step length )   Gait Assistance 5  Supervision   Additional items Assist x 1   Assistive Device Rolling walker   Distance 100 feet    Balance   Static Sitting Fair +   Dynamic Sitting Fair +   Static Standing Fair   Dynamic Standing Fair   Ambulatory Fair -   Activity Tolerance   Activity Tolerance Patient tolerated treatment well   Assessment   Prognosis Good   Problem List Decreased strength;Decreased range of motion;Decreased endurance; Impaired balance;Decreased mobility   Assessment Pt is a 80year old male admitted with a dx of bilateral LE edema  Pt presents with significant edema in his bilateral lower extremities  He presents with decreased strength, ROM, balance and functional activity tolerance  Good safety awareness and no LOB episodes during gait training  Pt would benefit form PT to help improve strength, ROM, balance, and functional activity tolerance      Goals   LTG Expiration Date 10/20/18   Long Term Goal #1 Pt will be (I) with all transfers and bed mobility    Long Term Goal #2 Pt will safely ambulate 500 feet using least restrictive AD   Plan   Treatment/Interventions LE strengthening/ROM; Elevations; Therapeutic exercise; Endurance training;Bed mobility;Gait training   PT Frequency Once a day; Twice a day   Recommendation   Recommendation 24 hour supervision/assist   Equipment Recommended Walker   PT - OK to Discharge Yes   Additional Comments When Medically stable    Pt  Seated in chair when PT entered   Pt seated in chair when PT left, all lines intact, all needs within reach

## 2018-10-06 NOTE — PLAN OF CARE
Problem: PHYSICAL THERAPY ADULT  Goal: Performs mobility at highest level of function for planned discharge setting  See evaluation for individualized goals  Treatment/Interventions: LE strengthening/ROM, Elevations, Therapeutic exercise, Endurance training, Bed mobility, Gait training  Equipment Recommended: Walker       See flowsheet documentation for full assessment, interventions and recommendations  Prognosis: Good  Problem List: Decreased strength, Decreased range of motion, Decreased endurance, Impaired balance, Decreased mobility  Assessment: Pt is a 80year old male admitted with a dx of bilateral LE edema  Pt presents with significant edema in his bilateral lower extremities  He presents with decreased strength, ROM, balance and functional activity tolerance  Good safety awareness and no LOB episodes during gait training  Pt would benefit form PT to help improve strength, ROM, balance, and functional activity tolerance  Recommendation: 24 hour supervision/assist     PT - OK to Discharge: Yes    See flowsheet documentation for full assessment

## 2018-10-07 LAB
ALBUMIN SERPL BCP-MCNC: 2.7 G/DL (ref 3.5–5)
ALP SERPL-CCNC: 84 U/L (ref 46–116)
ALT SERPL W P-5'-P-CCNC: 7 U/L (ref 12–78)
ANION GAP SERPL CALCULATED.3IONS-SCNC: 6 MMOL/L (ref 4–13)
AST SERPL W P-5'-P-CCNC: 18 U/L (ref 5–45)
BASOPHILS # BLD AUTO: 0.04 THOUSANDS/ΜL (ref 0–0.1)
BASOPHILS NFR BLD AUTO: 1 % (ref 0–1)
BILIRUB SERPL-MCNC: 0.4 MG/DL (ref 0.2–1)
BUN SERPL-MCNC: 44 MG/DL (ref 5–25)
CALCIUM SERPL-MCNC: 8.3 MG/DL (ref 8.3–10.1)
CHLORIDE SERPL-SCNC: 106 MMOL/L (ref 100–108)
CO2 SERPL-SCNC: 31 MMOL/L (ref 21–32)
CREAT SERPL-MCNC: 1.45 MG/DL (ref 0.6–1.3)
EOSINOPHIL # BLD AUTO: 0.22 THOUSAND/ΜL (ref 0–0.61)
EOSINOPHIL NFR BLD AUTO: 3 % (ref 0–6)
ERYTHROCYTE [DISTWIDTH] IN BLOOD BY AUTOMATED COUNT: 13.7 % (ref 11.6–15.1)
GFR SERPL CREATININE-BSD FRML MDRD: 40 ML/MIN/1.73SQ M
GLUCOSE SERPL-MCNC: 84 MG/DL (ref 65–140)
HCT VFR BLD AUTO: 38.4 % (ref 36.5–49.3)
HGB BLD-MCNC: 12.1 G/DL (ref 12–17)
IMM GRANULOCYTES # BLD AUTO: 0.03 THOUSAND/UL (ref 0–0.2)
IMM GRANULOCYTES NFR BLD AUTO: 0 % (ref 0–2)
LYMPHOCYTES # BLD AUTO: 1.07 THOUSANDS/ΜL (ref 0.6–4.47)
LYMPHOCYTES NFR BLD AUTO: 14 % (ref 14–44)
MAGNESIUM SERPL-MCNC: 1.8 MG/DL (ref 1.6–2.6)
MCH RBC QN AUTO: 29.4 PG (ref 26.8–34.3)
MCHC RBC AUTO-ENTMCNC: 31.5 G/DL (ref 31.4–37.4)
MCV RBC AUTO: 93 FL (ref 82–98)
MONOCYTES # BLD AUTO: 0.86 THOUSAND/ΜL (ref 0.17–1.22)
MONOCYTES NFR BLD AUTO: 11 % (ref 4–12)
NEUTROPHILS # BLD AUTO: 5.56 THOUSANDS/ΜL (ref 1.85–7.62)
NEUTS SEG NFR BLD AUTO: 71 % (ref 43–75)
NRBC BLD AUTO-RTO: 0 /100 WBCS
PHOSPHATE SERPL-MCNC: 3.1 MG/DL (ref 2.3–4.1)
PLATELET # BLD AUTO: 230 THOUSANDS/UL (ref 149–390)
PMV BLD AUTO: 11.9 FL (ref 8.9–12.7)
POTASSIUM SERPL-SCNC: 3.2 MMOL/L (ref 3.5–5.3)
PROT SERPL-MCNC: 5.8 G/DL (ref 6.4–8.2)
RBC # BLD AUTO: 4.12 MILLION/UL (ref 3.88–5.62)
SODIUM SERPL-SCNC: 143 MMOL/L (ref 136–145)
WBC # BLD AUTO: 7.78 THOUSAND/UL (ref 4.31–10.16)

## 2018-10-07 PROCEDURE — 85025 COMPLETE CBC W/AUTO DIFF WBC: CPT | Performed by: INTERNAL MEDICINE

## 2018-10-07 PROCEDURE — 99232 SBSQ HOSP IP/OBS MODERATE 35: CPT | Performed by: INTERNAL MEDICINE

## 2018-10-07 PROCEDURE — 83735 ASSAY OF MAGNESIUM: CPT | Performed by: INTERNAL MEDICINE

## 2018-10-07 PROCEDURE — 84100 ASSAY OF PHOSPHORUS: CPT | Performed by: INTERNAL MEDICINE

## 2018-10-07 PROCEDURE — 80053 COMPREHEN METABOLIC PANEL: CPT | Performed by: INTERNAL MEDICINE

## 2018-10-07 RX ORDER — NYSTATIN 100000 [USP'U]/G
POWDER TOPICAL 2 TIMES DAILY
Status: DISCONTINUED | OUTPATIENT
Start: 2018-10-07 | End: 2018-10-08 | Stop reason: HOSPADM

## 2018-10-07 RX ORDER — FUROSEMIDE 10 MG/ML
20 INJECTION INTRAMUSCULAR; INTRAVENOUS DAILY
Status: DISCONTINUED | OUTPATIENT
Start: 2018-10-08 | End: 2018-10-08 | Stop reason: HOSPADM

## 2018-10-07 RX ORDER — FUROSEMIDE 10 MG/ML
20 INJECTION INTRAMUSCULAR; INTRAVENOUS DAILY
Status: DISCONTINUED | OUTPATIENT
Start: 2018-10-07 | End: 2018-10-07

## 2018-10-07 RX ADMIN — FUROSEMIDE 20 MG: 10 INJECTION, SOLUTION INTRAVENOUS at 11:50

## 2018-10-07 RX ADMIN — ASPIRIN 81 MG 81 MG: 81 TABLET ORAL at 08:43

## 2018-10-07 RX ADMIN — SENNOSIDES 8.6 MG: 8.6 TABLET, FILM COATED ORAL at 08:44

## 2018-10-07 RX ADMIN — HEPARIN SODIUM 5000 UNITS: 5000 INJECTION, SOLUTION INTRAVENOUS; SUBCUTANEOUS at 15:43

## 2018-10-07 RX ADMIN — NYSTATIN 1 APPLICATION: 100000 POWDER TOPICAL at 15:43

## 2018-10-07 RX ADMIN — NYSTATIN: 100000 POWDER TOPICAL at 22:05

## 2018-10-07 RX ADMIN — DOCUSATE SODIUM 100 MG: 100 CAPSULE, LIQUID FILLED ORAL at 17:00

## 2018-10-07 RX ADMIN — DOCUSATE SODIUM 100 MG: 100 CAPSULE, LIQUID FILLED ORAL at 08:43

## 2018-10-07 RX ADMIN — TRAMADOL HYDROCHLORIDE 50 MG: 50 TABLET, FILM COATED ORAL at 21:16

## 2018-10-07 RX ADMIN — HEPARIN SODIUM 5000 UNITS: 5000 INJECTION, SOLUTION INTRAVENOUS; SUBCUTANEOUS at 06:22

## 2018-10-07 RX ADMIN — METOPROLOL SUCCINATE 25 MG: 25 TABLET, EXTENDED RELEASE ORAL at 08:44

## 2018-10-07 RX ADMIN — PRAVASTATIN SODIUM 40 MG: 40 TABLET ORAL at 21:16

## 2018-10-07 RX ADMIN — TRAMADOL HYDROCHLORIDE 50 MG: 50 TABLET, FILM COATED ORAL at 08:50

## 2018-10-07 RX ADMIN — CEFAZOLIN SODIUM 1000 MG: 1 SOLUTION INTRAVENOUS at 15:43

## 2018-10-07 RX ADMIN — HEPARIN SODIUM 5000 UNITS: 5000 INJECTION, SOLUTION INTRAVENOUS; SUBCUTANEOUS at 21:16

## 2018-10-07 RX ADMIN — CEFAZOLIN SODIUM 1000 MG: 1 SOLUTION INTRAVENOUS at 01:13

## 2018-10-07 RX ADMIN — LISINOPRIL: 20 TABLET ORAL at 08:44

## 2018-10-07 NOTE — ASSESSMENT & PLAN NOTE
Patient is increased swelling of bilateral lower extremities today, edema is dependent, keep lower extremities elevated, continue with Lasix 20 mg daily for now, monitor daily labs    Patient with history of cardiac murmur very limited physical exam/difficult to auscultate heart sounds, check echocardiogram    Keep lower extremities elevated, right lower extremity Doppler was negative for DVT     Suspected right lower extremity cellulitis, continue with IV Ancef

## 2018-10-07 NOTE — PROGRESS NOTES
Progress Note - Amber Olivares 5/13/1921, 80 y o  male MRN: [id number]    Unit/Bed#: 495-88 Encounter: [id number]    Primary Care Provider: Ian Mancera DO   Date and time admitted to hospital: 10/5/2018 10:25 AM        * Bilateral leg edema   Assessment & Plan    Patient is increased swelling of bilateral lower extremities today, edema is dependent, keep lower extremities elevated, continue with Lasix 20 mg daily for now, monitor daily labs    Patient with history of cardiac murmur very limited physical exam/difficult to auscultate heart sounds, check echocardiogram    Keep lower extremities elevated, right lower extremity Doppler was negative for DVT     Suspected right lower extremity cellulitis, continue with IV Ancef     ALEX (acute kidney injury) ([facility] [address] )   Assessment & Plan    Possible acute kidney injury, baseline creatinine is unknown  Creatinine in 2014 was 1 11    Suspected chronic kidney disease stage 3     Cellulitis   Assessment & Plan    Possible right lower extremity cellulitis, give Ancef continue to monitor keep lower extremities elevated  Elevated brain natriuretic peptide (BNP) level   Assessment & Plan    Check echocardiogram when available     Hx of cardiac murmur   Assessment & Plan    Check echocardiogram, medical chart was reviewed, I had seen the patient in 2014 and noted that he had a cardiac murmur consistent with possible aortic stenosis         VTE Pharmacologic Prophylaxis:   Pharmacologic: Heparin  Mechanical VTE Prophylaxis in Place: Yes    Patient Centered Rounds: I have performed bedside rounds with nursing staff today        Current Length of Stay: 2 day(s)    Current Patient Status: Inpatient   Certification Statement: The patient will continue to require additional inpatient hospital stay due to Lower extremity edema, patient needs cardiac eval/echo    Discharge Plan / Estimated Discharge Date:  10/9/2018      Code Status: Level 1 - Full Code      Subjective:   No pain, minimal right lower extremity pain intermittently    Objective:     Vitals:   Temp (24hrs), Av 6 °F (36 4 °C), Min:97 2 °F (36 2 °C), Max:98 2 °F (36 8 °C)    HR:  [68-69] 69  Resp:  [18] 18  BP: (110-125)/(53-58) 125/57  SpO2:  [98 %-100 %] 98 %  Body mass index is 19 3 kg/m²  Input and Output Summary (last 24 hours): Intake/Output Summary (Last 24 hours) at 10/07/18 1042  Last data filed at 10/07/18 0900   Gross per 24 hour   Intake              480 ml   Output             1100 ml   Net             -620 ml       Physical Exam:     Physical Exam   Constitutional: He is oriented to person, place, and time  He appears well-developed and well-nourished  No distress  Pulmonary/Chest: Effort normal and breath sounds normal  No respiratory distress  He has no wheezes  Abdominal: Soft  Bowel sounds are normal  He exhibits no distension  There is no tenderness  Musculoskeletal: He exhibits edema  Neurological: He is alert and oriented to person, place, and time  No cranial nerve deficit  Coordination normal    Skin: He is not diaphoretic  Psychiatric: He has a normal mood and affect  His behavior is normal  Judgment and thought content normal    Nursing note and vitals reviewed  Additional Data:     Labs:      Results from last 7 days  Lab Units 10/07/18  0623   WBC Thousand/uL 7 78   HEMOGLOBIN g/dL 12 1   HEMATOCRIT % 38 4   PLATELETS Thousands/uL 230   NEUTROS PCT % 71   LYMPHS PCT % 14   MONOS PCT % 11   EOS PCT % 3       Results from last 7 days  Lab Units 10/07/18  0623   SODIUM mmol/L 143   POTASSIUM mmol/L 3 2*   CHLORIDE mmol/L 106   CO2 mmol/L 31   BUN mg/dL 44*   CREATININE mg/dL 1 45*   CALCIUM mg/dL 8 3   ALK PHOS U/L 84   ALT U/L 7*   AST U/L 18       Results from last 7 days  Lab Units 10/05/18  1139   INR  1 15       * I Have Reviewed All Lab Data Listed Above  * Additional Pertinent Lab Tests Reviewed:  All [place] Admission Reviewed            Last 24 Hours Medication List:     Current Facility-Administered Medications:  acetaminophen 650 mg Oral Q6H PRN Sundra DO [place]   aspirin 81 mg Oral Daily Sundra DO [place]   cefazolin 1,000 mg Intravenous Q12H Sundra [place],    docusate sodium 100 mg Oral BID Sundra [place],    furosemide 20 mg Intravenous Daily Sundra [place],    heparin (porcine) 5,000 Units Subcutaneous [facility] Sundra DO [place]   lisinopril-hydrochlorothiazide (PRINZIDE 20/12  5) combo dose  Oral Daily Sundra [place],    metoprolol succinate 25 mg Oral Daily Sundra [place],    ondansetron 4 mg Intravenous Q6H PRN Sundelisa Storey DO   pneumococcal 13-valent conjugate vaccine 0 5 mL Intramuscular Prior to discharge Sundelisa Storey DO   pravastatin 40 mg Oral HS Sundra DO [place]   senna 1 tablet Oral Daily Sundra DO [place]   traMADol 50 mg Oral Q6H PRN Sundelisa Storey DO        Today, Patient Was Seen By: Alisha Storey DO

## 2018-10-07 NOTE — PLAN OF CARE
Problem: Potential for Falls  Goal: Patient will remain free of falls  INTERVENTIONS:  - Assess patient frequently for physical needs  -  Identify cognitive and physical deficits and behaviors that affect risk of falls  -  Amelia fall precautions as indicated by assessment   High fall risk    - Educate patient/family on patient safety including physical limitations  - Instruct patient to call for assistance with activity based on assessment  - Modify environment to reduce risk of injury  - Consider OT/PT consult to assist with strengthening/mobility   Outcome: Progressing      Problem: Prexisting or High Potential for Compromised Skin Integrity  Goal: Skin integrity is maintained or improved  INTERVENTIONS:  - Identify patients at risk for skin breakdown  - Assess and monitor skin integrity  - Assess and monitor nutrition and hydration status  - Monitor labs (i e  albumin)  - Assess for incontinence   - Turn and reposition patient  - Assist with mobility/ambulation  - Relieve pressure over bony prominences  - Avoid friction and shearing  - Provide appropriate hygiene as needed including keeping skin clean and dry  - Evaluate need for skin moisturizer/barrier cream  - Collaborate with interdisciplinary team (i e  Nutrition, Rehabilitation, etc )   - Patient/family teaching   Outcome: Progressing      Problem: PAIN - ADULT  Goal: Verbalizes/displays adequate comfort level or baseline comfort level  Interventions:  - Encourage patient to monitor pain and request assistance  - Assess pain using appropriate pain scale  - Administer analgesics based on type and severity of pain and evaluate response  - Implement non-pharmacological measures as appropriate and evaluate response  - Consider cultural and social influences on pain and pain management  - Notify physician/advanced practitioner if interventions unsuccessful or patient reports new pain   Outcome: Progressing      Problem: INFECTION - ADULT  Goal: Absence or prevention of progression during hospitalization  INTERVENTIONS:  - Assess and monitor for signs and symptoms of infection  - Monitor lab/diagnostic results  - Monitor all insertion sites, i e  indwelling lines, tubes, and drains  - Administer medications as ordered  - Instruct and encourage patient and family to use good hand hygiene technique  - Identify and instruct in appropriate isolation precautions for identified infection/condition   Outcome: Progressing    Goal: Absence of fever/infection during neutropenic period  INTERVENTIONS:  - Monitor WBC    Outcome: Progressing      Problem: SAFETY ADULT  Goal: Maintain or return to baseline ADL function  INTERVENTIONS:  -  Assess patient's ability to carry out ADLs; assess patient's baseline for ADL function and identify physical deficits which impact ability to perform ADLs (bathing, care of mouth/teeth, toileting, grooming, dressing, etc )  - Assess/evaluate cause of self-care deficits   - Assess range of motion  - Assess patient's mobility; develop plan if impaired  - Assess patient's need for assistive devices and provide as appropriate  - Encourage maximum independence but intervene and supervise when necessary  ¯ Involve family in performance of ADLs  ¯ Assess for home care needs following discharge   ¯ Request OT consult to assist with ADL evaluation and planning for discharge  ¯ Provide patient education as appropriate   Outcome: Progressing    Goal: Maintain or return mobility status to optimal level  INTERVENTIONS:  - Assess patient's baseline mobility status (ambulation, transfers, stairs, etc )    - Identify cognitive and physical deficits and behaviors that affect mobility  - Identify mobility aids required to assist with transfers and/or ambulation (gait belt, sit-to-stand, lift, walker, cane, etc )  - Sharon fall precautions as indicated by assessment  - Record patient progress and toleration of activity level on Mobility SBAR; progress patient to next Phase/Stage  - Instruct patient to call for assistance with activity based on assessment  - Request Rehabilitation consult to assist with strengthening/weightbearing, etc    Outcome: Progressing      Problem: DISCHARGE PLANNING  Goal: Discharge to home or other facility with appropriate resources  INTERVENTIONS:  - Identify barriers to discharge w/patient and caregiver  - Arrange for needed discharge resources and transportation as appropriate  - Identify discharge learning needs (meds, wound care, etc )  - Arrange for interpretive services to assist at discharge as needed  - Refer to Case Management Department for coordinating discharge planning if the patient needs post-hospital services based on physician/advanced practitioner order or complex needs related to functional status, cognitive ability, or social support system   Outcome: Progressing      Problem: Knowledge Deficit  Goal: Patient/family/caregiver demonstrates understanding of disease process, treatment plan, medications, and discharge instructions  Complete learning assessment and assess knowledge base    Interventions:  - Provide teaching at level of understanding  - Provide teaching via preferred learning methods   Outcome: Progressing      Problem: DISCHARGE PLANNING - CARE MANAGEMENT  Goal: Discharge to post-acute care or home with appropriate resources  INTERVENTIONS:  - Conduct assessment to determine patient/family and health care team treatment goals, and need for post-acute services based on payer coverage, community resources, and patient preferences, and barriers to discharge  - Address psychosocial, clinical, and financial barriers to discharge as identified in assessment in conjunction with the patient/family and health care team  - Arrange appropriate level of post-acute services according to patient's   needs and preference and payer coverage in collaboration with the physician and health care team  - Communicate with and update the patient/family, physician, and health care team regarding progress on the discharge plan  - Arrange appropriate transportation to post-acute venues    Outcome: Progressing      Problem: METABOLIC, FLUID AND ELECTROLYTES - ADULT  Goal: Fluid balance maintained  INTERVENTIONS:  - Monitor labs and assess for signs and symptoms of volume excess or deficit  - Monitor I/O and WT  - Instruct patient on fluid and nutrition as appropriate  Outcome: Progressing

## 2018-10-08 ENCOUNTER — APPOINTMENT (INPATIENT)
Dept: NON INVASIVE DIAGNOSTICS | Facility: HOSPITAL | Age: 83
DRG: 603 | End: 2018-10-08
Payer: COMMERCIAL

## 2018-10-08 VITALS
WEIGHT: 136.69 LBS | HEART RATE: 69 BPM | BODY MASS INDEX: 19.57 KG/M2 | RESPIRATION RATE: 18 BRPM | OXYGEN SATURATION: 96 % | HEIGHT: 70 IN | TEMPERATURE: 97.7 F | SYSTOLIC BLOOD PRESSURE: 136 MMHG | DIASTOLIC BLOOD PRESSURE: 71 MMHG

## 2018-10-08 PROBLEM — B37.0 ORAL THRUSH: Status: ACTIVE | Noted: 2018-10-08

## 2018-10-08 PROCEDURE — G0009 ADMIN PNEUMOCOCCAL VACCINE: HCPCS | Performed by: INTERNAL MEDICINE

## 2018-10-08 PROCEDURE — 99239 HOSP IP/OBS DSCHRG MGMT >30: CPT | Performed by: INTERNAL MEDICINE

## 2018-10-08 PROCEDURE — 90670 PCV13 VACCINE IM: CPT | Performed by: INTERNAL MEDICINE

## 2018-10-08 PROCEDURE — 97530 THERAPEUTIC ACTIVITIES: CPT

## 2018-10-08 PROCEDURE — 93306 TTE W/DOPPLER COMPLETE: CPT | Performed by: INTERNAL MEDICINE

## 2018-10-08 PROCEDURE — 97116 GAIT TRAINING THERAPY: CPT

## 2018-10-08 PROCEDURE — 93306 TTE W/DOPPLER COMPLETE: CPT

## 2018-10-08 RX ORDER — POTASSIUM CHLORIDE 20 MEQ/1
40 TABLET, EXTENDED RELEASE ORAL ONCE
Status: COMPLETED | OUTPATIENT
Start: 2018-10-08 | End: 2018-10-08

## 2018-10-08 RX ORDER — CEPHALEXIN 250 MG/1
250 CAPSULE ORAL EVERY 12 HOURS SCHEDULED
Status: DISCONTINUED | OUTPATIENT
Start: 2018-10-08 | End: 2018-10-08 | Stop reason: HOSPADM

## 2018-10-08 RX ORDER — DOCUSATE SODIUM 100 MG/1
100 CAPSULE, LIQUID FILLED ORAL 2 TIMES DAILY
Qty: 10 CAPSULE | Refills: 0 | Status: ON HOLD | OUTPATIENT
Start: 2018-10-08 | End: 2018-11-05

## 2018-10-08 RX ORDER — TRAMADOL HYDROCHLORIDE 50 MG/1
50 TABLET ORAL 2 TIMES DAILY PRN
Qty: 30 TABLET | Refills: 0 | Status: SHIPPED | OUTPATIENT
Start: 2018-10-08 | End: 2018-10-18

## 2018-10-08 RX ORDER — CEPHALEXIN 250 MG/1
250 CAPSULE ORAL EVERY 8 HOURS SCHEDULED
Qty: 15 CAPSULE | Refills: 0 | Status: SHIPPED | OUTPATIENT
Start: 2018-10-08 | End: 2018-10-13

## 2018-10-08 RX ADMIN — HEPARIN SODIUM 5000 UNITS: 5000 INJECTION, SOLUTION INTRAVENOUS; SUBCUTANEOUS at 15:30

## 2018-10-08 RX ADMIN — NYSTATIN 500000 UNITS: 100000 SUSPENSION ORAL at 15:29

## 2018-10-08 RX ADMIN — HEPARIN SODIUM 5000 UNITS: 5000 INJECTION, SOLUTION INTRAVENOUS; SUBCUTANEOUS at 05:35

## 2018-10-08 RX ADMIN — NYSTATIN: 100000 POWDER TOPICAL at 09:31

## 2018-10-08 RX ADMIN — CEFAZOLIN SODIUM 1000 MG: 1 SOLUTION INTRAVENOUS at 02:30

## 2018-10-08 RX ADMIN — DOCUSATE SODIUM 100 MG: 100 CAPSULE, LIQUID FILLED ORAL at 09:23

## 2018-10-08 RX ADMIN — FUROSEMIDE 20 MG: 10 INJECTION, SOLUTION INTRAVENOUS at 09:23

## 2018-10-08 RX ADMIN — METOPROLOL SUCCINATE 25 MG: 25 TABLET, EXTENDED RELEASE ORAL at 09:23

## 2018-10-08 RX ADMIN — LISINOPRIL: 20 TABLET ORAL at 09:23

## 2018-10-08 RX ADMIN — SENNOSIDES 8.6 MG: 8.6 TABLET, FILM COATED ORAL at 09:23

## 2018-10-08 RX ADMIN — PNEUMOCOCCAL 13-VALENT CONJUGATE VACCINE 0.5 ML: 2.2; 2.2; 2.2; 2.2; 2.2; 4.4; 2.2; 2.2; 2.2; 2.2; 2.2; 2.2; 2.2 INJECTION, SUSPENSION INTRAMUSCULAR at 15:36

## 2018-10-08 RX ADMIN — POTASSIUM CHLORIDE 40 MEQ: 1500 TABLET, EXTENDED RELEASE ORAL at 10:48

## 2018-10-08 RX ADMIN — CEPHALEXIN 250 MG: 250 CAPSULE ORAL at 10:48

## 2018-10-08 RX ADMIN — ASPIRIN 81 MG 81 MG: 81 TABLET ORAL at 09:23

## 2018-10-08 NOTE — PROGRESS NOTES
Progress Note Dada Alvarado 5/13/1921, 80 y o  male MRN: [id number]    Unit/Bed#: 789-40 Encounter: [id number]    Primary Care Provider: Yuriy Curry DO   Date and time admitted to hospital: 10/5/2018 10:25 AM        * Bilateral leg edema   Assessment & Plan    Patient had increased swelling of bilateral lower extremities, edema is dependent, keep lower extremities elevated  Edema is markedly improved, will discharge off of any further diuretics, will order lower extremity compressive stockings, close follow-up with primary care physician  Echocardiogram pending at discharge  Patient with history of cardiac murmur very limited physical exam/difficult to auscultate heart sounds  Keep lower extremities elevated, right lower extremity Doppler was negative for DVT     Suspected right lower extremity cellulitis, changed to p o  Keflex for an additional 5 days     Oral thrush   Assessment & Plan    Short course of oral nystatin     ALEX (acute kidney injury) ([facility] [address] )   Assessment & Plan    Possible acute kidney injury, baseline creatinine is unknown  Creatinine in 2014 was 1 11    Suspected chronic kidney disease stage 3, recommend repeat blood work in 2 to 4 weeks     Cellulitis   Assessment & Plan    Questionable right lower extremity cellulitis due to erythema and increased pain, overall marked improvement while in the hospital with treatment of lower extremity edema, this may have been component of stasis dermatitis versus bacterial cellulitis, 5 additional days of p  O  Keflex ordered  Elevated brain natriuretic peptide (BNP) level   Assessment & Plan    Echocardiogram completed, official report pending, follow up with PCP, if abnormal will need outpatient cardiology follow-up  Hx of cardiac murmur   Assessment & Plan    Echo pending         Discharging Physician / Practitioner: Aarti Land DO  PCP: Yuriy Curry DO  Admission Date:   Admission Orders     Ordered        10/05/18 0769 Inpatient Admission  Once         10/05/18 1338  Place in Observation (expected length of stay for this patient is less than two midnights)  Once             Discharge Date: 10/08/18        Consultations During Hospital Stay:  · None    Procedures Performed:     · Echocardiogram    Significant Findings / Test Results:     · None    Incidental Findings:   · None     Test Results Pending at Discharge (will require follow up): · Echocardiogram result     Outpatient Tests Requested:  · Recommend repeat BMP in 2 to 4 weeks    Complications:  None    Reason for Admission:  Lower extremity edema and pain    Hospital Course:     Sarkis Hinkle is a 80 y o  male patient who originally presented to the hospital on 10/5/2018 due to bilateral lower extremity edema, increased pain in right lower extremity  Please refer to admitting history physical for the details and plan of care, daily progress notes for detailed explanation of hospital course  Please see above list of diagnoses and related plan for additional information  Condition at Discharge: good     Discharge Day Visit / Exam:     Subjective:  No pain  Vitals: Blood Pressure: 136/71 (10/08/18 0714)  Pulse: 69 (10/08/18 0714)  Temperature: 97 7 °F (36 5 °C) (10/08/18 0714)  Temp Source: Temporal (10/08/18 0714)  Respirations: 18 (10/08/18 0714)  Height: 5' 10" (177 8 cm) (10/05/18 1455)  Weight - Scale: 62 kg (136 lb 11 oz) (10/08/18 0543)  SpO2: 96 % (10/08/18 0714)  Exam:   Physical Exam   Constitutional: He is oriented to person, place, and time  He appears well-developed and well-nourished  No distress  Pulmonary/Chest: Effort normal and breath sounds normal  No respiratory distress  He has no wheezes  Abdominal: Soft  Bowel sounds are normal  He exhibits no distension  There is no tenderness  Musculoskeletal: He exhibits edema  Markedly decreased edema since admission  Neurological: He is alert and oriented to person, place, and time   No cranial nerve deficit  Coordination normal    Skin: Skin is warm and dry  He is not diaphoretic  Nursing note and vitals reviewed  Discharge instructions/Information to patient and family:   See after visit summary for information provided to patient and family  Provisions for Follow-Up Care:  See after visit summary for information related to follow-up care and any pertinent home health orders  Disposition:     Home with VNA Services (Reminder: Complete face to face encounter)    For Discharges to [facility] SNF:   · Not Applicable to this Patient - Not Applicable to this Patient    Planned Readmission: no     Discharge Statement:  I spent 35 minutes discharging the patient  This time was spent on the day of discharge  I had direct contact with the patient on the day of discharge  Greater than 50% of the total time was spent examining patient, answering all patient questions, arranging and discussing plan of care with patient as well as directly providing post-discharge instructions  Additional time then spent on discharge activities  Discharge Medications:  See after visit summary for reconciled discharge medications provided to patient and family        ** Please Note: This note has been constructed using a voice recognition system **

## 2018-10-08 NOTE — ASSESSMENT & PLAN NOTE
Patient had increased swelling of bilateral lower extremities, edema is dependent, keep lower extremities elevated  Edema is markedly improved, will discharge off of any further diuretics, will order lower extremity compressive stockings, close follow-up with primary care physician  Echocardiogram pending at discharge  Patient with history of cardiac murmur very limited physical exam/difficult to auscultate heart sounds  Keep lower extremities elevated, right lower extremity Doppler was negative for DVT     Suspected right lower extremity cellulitis, changed to p o   Keflex for an additional 5 days

## 2018-10-08 NOTE — ASSESSMENT & PLAN NOTE
Possible acute kidney injury, baseline creatinine is unknown      Creatinine in 2014 was 1 11    Suspected chronic kidney disease stage 3, recommend repeat blood work in 2 to 4 weeks

## 2018-10-08 NOTE — ASSESSMENT & PLAN NOTE
Questionable right lower extremity cellulitis due to erythema and increased pain, overall marked improvement while in the hospital with treatment of lower extremity edema, this may have been component of stasis dermatitis versus bacterial cellulitis, 5 additional days of p  O  Keflex ordered

## 2018-10-08 NOTE — ASSESSMENT & PLAN NOTE
Echocardiogram completed, official report pending, follow up with PCP, if abnormal will need outpatient cardiology follow-up

## 2018-10-08 NOTE — SOCIAL WORK
Cm spoke with the patient and they are for d/c to home today  Cm is taking into account that the patient has preferences while planning the d/c needs  Cm plan was discussed with the patient and the patient is agreeable to the plan the patient does understand the importance of follow up care and taking the medications as prescribed  The patient is aware of any symptoms that he should look for when d/c  I spoke with the patients son Tobias Griggs and he is aware that the patient will get [facility] them I told them to call the son before going to the appointment   His # is [phone]

## 2018-10-08 NOTE — DISCHARGE SUMMARY
Discharge- Trevon Gutierrez 5/13/1921, 80 y o  male MRN: [id number]    Unit/Bed#: 481-69 Encounter: [id number]    Primary Care Provider: Eugene Goss DO   Date and time admitted to hospital: 10/5/2018 10:25 AM      Bilateral leg edema   Assessment & Plan     Patient had increased swelling of bilateral lower extremities, edema is dependent, keep lower extremities elevated  Edema is markedly improved, will discharge off of any further diuretics, will order lower extremity compressive stockings, close follow-up with primary care physician      Echocardiogram pending at discharge      Patient with history of cardiac murmur very limited physical exam/difficult to auscultate heart sounds      Keep lower extremities elevated, right lower extremity Doppler was negative for DVT      Suspected right lower extremity cellulitis, changed to p o  Keflex for an additional 5 days      Oral thrush   Assessment & Plan     Short course of oral nystatin      ALEX (acute kidney injury) ([facility] [address] )   Assessment & Plan     Possible acute kidney injury, baseline creatinine is unknown      Creatinine in 2014 was 1 11     Suspected chronic kidney disease stage 3, recommend repeat blood work in 2 to 4 weeks      Cellulitis   Assessment & Plan     Questionable right lower extremity cellulitis due to erythema and increased pain, overall marked improvement while in the hospital with treatment of lower extremity edema, this may have been component of stasis dermatitis versus bacterial cellulitis, 5 additional days of p   O  Keflex ordered         Elevated brain natriuretic peptide (BNP) level   Assessment & Plan     Echocardiogram completed, official report pending, follow up with PCP, if abnormal will need outpatient cardiology follow-up       Hx of cardiac murmur   Assessment & Plan     Echo pending          Discharging Physician / Practitioner: Tiffanie Pryor DO  PCP: Eugene Goss DO  Admission Date:         Admission Orders      Ordered         10/05/18 1610   Inpatient Admission  Once          10/05/18 1338   Place in Observation (expected length of stay for this patient is less than two midnights)  Once               Discharge Date: 10/08/18           Consultations During Hospital Stay:  · None     Procedures Performed:      · Echocardiogram     Significant Findings / Test Results:      · None     Incidental Findings:   · None      Test Results Pending at Discharge (will require follow up): · Echocardiogram result     Outpatient Tests Requested:  · Recommend repeat BMP in 2 to 4 weeks     Complications:  None     Reason for Admission:  Lower extremity edema and pain     Hospital Course:      Tre Turner is a 80 y o  male patient who originally presented to the hospital on 10/5/2018 due to bilateral lower extremity edema, increased pain in right lower extremity         Please refer to admitting history physical for the details and plan of care, daily progress notes for detailed explanation of hospital course      Please see above list of diagnoses and related plan for additional information       Condition at Discharge: good      Discharge Day Visit / Exam:      Subjective:  No pain  Vitals: Blood Pressure: 136/71 (10/08/18 0714)  Pulse: 69 (10/08/18 0714)  Temperature: 97 7 °F (36 5 °C) (10/08/18 0714)  Temp Source: Temporal (10/08/18 0714)  Respirations: 18 (10/08/18 0714)  Height: 5' 10" (177 8 cm) (10/05/18 1455)  Weight - Scale: 62 kg (136 lb 11 oz) (10/08/18 0543)  SpO2: 96 % (10/08/18 0714)  Exam:   Physical Exam   Constitutional: He is oriented to person, place, and time  He appears well-developed and well-nourished  No distress  Pulmonary/Chest: Effort normal and breath sounds normal  No respiratory distress  He has no wheezes  Abdominal: Soft  Bowel sounds are normal  He exhibits no distension  There is no tenderness  Musculoskeletal: He exhibits edema  Markedly decreased edema since admission     Neurological: He is alert and oriented to person, place, and time  No cranial nerve deficit  Coordination normal    Skin: Skin is warm and dry  He is not diaphoretic  Nursing note and vitals reviewed         Discharge instructions/Information to patient and family:   See after visit summary for information provided to patient and family        Provisions for Follow-Up Care:  See after visit summary for information related to follow-up care and any pertinent home health orders        Disposition:      Home with VNA Services (Reminder: Complete face to face encounter)     For Discharges to [address] [facility]:   · Not Applicable to this Patient - Not Applicable to this Patient     Planned Readmission: no     Discharge Statement:  I spent 35 minutes discharging the patient  This time was spent on the day of discharge  I had direct contact with the patient on the day of discharge  Greater than 50% of the total time was spent examining patient, answering all patient questions, arranging and discussing plan of care with patient as well as directly providing post-discharge instructions    Additional time then spent on discharge activities      Discharge Medications:  See after visit summary for reconciled discharge medications provided to patient and family        ** Please Note: This note has been constructed using a voice recognition system **

## 2018-10-08 NOTE — PHYSICAL THERAPY NOTE
PT Treatment note      10/08/18 1040   Restrictions/Precautions   Weight Bearing Precautions Per Order No   Other Precautions (Fall Risk;Multiple lines;Telemetry)   General   Family/Caregiver Present No   Subjective   Subjective c/o feeling slightly dizzy upon standing  Agreeable to OOB to chair after encouragement  refused further ambulation  Bed Mobility   Supine to Sit 4  Minimal assistance   Additional items Assist x 1;HOB elevated; Increased time required;Verbal cues;LE management   Additional Comments stood at EOB with RW  c/o feeling slightly dizzy  Transfers   Sit to Stand 4  Minimal assistance   Additional items Assist x 1; Increased time required;Verbal cues   Stand to Sit 4  Minimal assistance   Additional items Assist x 1; Armrests; Verbal cues   Stand pivot 4  Minimal assistance   Additional items Assist x 1;Verbal cues   Ambulation/Elevation   Gait pattern Narrow ISIDORO; Decreased foot clearance;Shuffling   Gait Assistance 4  Minimal assist   Additional items Assist x 1;Verbal cues   Assistive Device Rolling walker   Distance 3' bed to chair   Balance   Static Sitting Fair +   Dynamic Sitting Fair +   Static Standing Fair   Dynamic Standing Fair -   Ambulatory Fair -   Endurance Deficit   Endurance Deficit Yes   Activity Tolerance   Activity Tolerance Patient limited by fatigue   Assessment   Prognosis Good   Problem List Decreased strength;Decreased endurance; Impaired balance;Decreased mobility; Decreased safety awareness   Assessment  Pt  required increased assist for bed mobility, transfers and ambulation  Ambulated with RW short distance bed to chair with unsteady gait  Pt would benefit form PT to help improve strength, ROM, balance, and functional activity tolerance  Plan   Treatment/Interventions Functional transfer training;LE strengthening/ROM; Therapeutic exercise; Endurance training;Bed mobility;Gait training   Progress Slow progress, decreased activity tolerance   Recommendation Recommendation 24 hour supervision/assist   Pt  OOB in chair  with call bell within reach with LE's elevated and alarm on at end of PT session  Discussed with Lindsay John, PT today's treatment and patient's current level of function for care coordination

## 2018-10-09 ENCOUNTER — TRANSITIONAL CARE MANAGEMENT (OUTPATIENT)
Dept: FAMILY MEDICINE CLINIC | Facility: CLINIC | Age: 83
End: 2018-10-09

## 2018-10-09 DIAGNOSIS — E78.2 MIXED HYPERLIPIDEMIA: ICD-10-CM

## 2018-10-09 DIAGNOSIS — I10 ESSENTIAL HYPERTENSION: ICD-10-CM

## 2018-10-09 RX ORDER — PRAVASTATIN SODIUM 40 MG
40 TABLET ORAL
Qty: 30 TABLET | Refills: 5 | Status: SHIPPED | OUTPATIENT
Start: 2018-10-09

## 2018-10-09 RX ORDER — METOPROLOL SUCCINATE 25 MG/1
25 TABLET, EXTENDED RELEASE ORAL DAILY
Qty: 30 TABLET | Refills: 5 | Status: ON HOLD | OUTPATIENT
Start: 2018-10-09 | End: 2018-11-05

## 2018-10-09 NOTE — SOCIAL WORK
The patient is referred to the outpatient coordinator  Also I left the [facility] know that they need to call the son ivon as the patient and his wife is hard of hearing

## 2018-10-09 NOTE — SOCIAL WORK
I CALLED THE PATIENT AS THE ADMISSIONS CALLED ME AND THE PATIENTB WAS CONFUSED ON HIS MEDICATIONS AND I CALLED THE PATIENT AND I CALLED [facility] AND THEY WILL BE OUT TO SEE HIM TODAY

## 2018-10-11 ENCOUNTER — TRANSITIONAL CARE MANAGEMENT (OUTPATIENT)
Dept: FAMILY MEDICINE CLINIC | Facility: CLINIC | Age: 83
End: 2018-10-11

## 2018-10-15 ENCOUNTER — OFFICE VISIT (OUTPATIENT)
Dept: FAMILY MEDICINE CLINIC | Facility: CLINIC | Age: 83
End: 2018-10-15
Payer: COMMERCIAL

## 2018-10-15 ENCOUNTER — TELEPHONE (OUTPATIENT)
Dept: FAMILY MEDICINE CLINIC | Facility: CLINIC | Age: 83
End: 2018-10-15

## 2018-10-15 VITALS
SYSTOLIC BLOOD PRESSURE: 110 MMHG | HEIGHT: 70 IN | WEIGHT: 141.2 LBS | BODY MASS INDEX: 20.22 KG/M2 | DIASTOLIC BLOOD PRESSURE: 72 MMHG

## 2018-10-15 DIAGNOSIS — R09.89 LABILE HYPERTENSION: Primary | ICD-10-CM

## 2018-10-15 PROCEDURE — 99496 TRANSJ CARE MGMT HIGH F2F 7D: CPT | Performed by: FAMILY MEDICINE

## 2018-10-15 PROCEDURE — 1111F DSCHRG MED/CURRENT MED MERGE: CPT | Performed by: FAMILY MEDICINE

## 2018-10-15 NOTE — TELEPHONE ENCOUNTER
SECOND CALL FROM HOME CARE, YOGESH WENT IN TODAY AND BP /60 WITH SITTING AND 80/40 WHEN THEY STAND HIM UP  DEB MEJIA THE MEDICATION SWITCH  PT GOING TO TRY TO GET APPT TODAY

## 2018-10-15 NOTE — TELEPHONE ENCOUNTER
Seen Saturday due to weakness & B/P -- B/P taken before meds B/P 110/60 sitting then dropped down to 80/50 when standing - Metoprolol was moved to evening slot rather than take both medications in the morning -- Newest Rx Lisinopril/Hctz 20/12 5mg (From hospital) - Cancelled TCM appt -- Encouraged son to make appt  --Pt says it does no good to be seen in office  -- Maybe if someone could call Pt's son & get him in for appt  Pt says it's his choice if he wants to take Rx's - Was not taking Rx's because he said they make him feel like "Crap" - Meds were put in planner now for Pt to take  WOULD PT BE HOSPICE APPROPRIATE?     Calling mostly to report home visit

## 2018-10-15 NOTE — PROGRESS NOTES
Assessment/Plan:   Will cut his lisinopril HCTZ in half patient will be seen again in approximately 8 weeks he will have a BMP done in 1 month     Diagnoses and all orders for this visit:    Labile hypertension  -     Basic metabolic panel; Future         Subjective:     Patient ID: Fritzi Gitelman is a 80 y o  male  Patient here transition of care I have reviewed his hospital record reconciled his medications blood pressure low and he is orthostatic will cut the lisinopril in half        Review of Systems   Constitutional: Positive for activity change and appetite change  Negative for diaphoresis, fatigue and fever  HENT: Negative  Eyes: Negative  Respiratory: Negative for apnea, cough, chest tightness, shortness of breath and wheezing  Cardiovascular: Negative for chest pain, palpitations and leg swelling  Gastrointestinal: Negative for abdominal distention, abdominal pain, anal bleeding, constipation, diarrhea, nausea and vomiting  Endocrine: Negative for cold intolerance, heat intolerance, polydipsia, polyphagia and polyuria  Genitourinary: Negative for difficulty urinating, dysuria, flank pain, hematuria and urgency  Musculoskeletal: Negative for arthralgias, back pain, gait problem, joint swelling and myalgias  Skin: Negative for color change, rash and wound  Allergic/Immunologic: Negative for environmental allergies, food allergies and immunocompromised state  Neurological: Negative for dizziness, seizures, syncope, speech difficulty, numbness and headaches  Vasovagal syncope or orthostasis   Hematological: Negative for adenopathy  Does not bruise/bleed easily  Psychiatric/Behavioral: Negative for agitation, behavioral problems, hallucinations, sleep disturbance and suicidal ideas  Objective:     Physical Exam   Constitutional: He is oriented to person, place, and time  He appears well-developed and well-nourished  No distress  HENT:   Head: Normocephalic     Right Ear: External ear normal    Left Ear: External ear normal    Nose: Nose normal    Mouth/Throat: Oropharynx is clear and moist    Eyes: Pupils are equal, round, and reactive to light  Conjunctivae and EOM are normal  Right eye exhibits no discharge  Left eye exhibits no discharge  No scleral icterus  Neck: Normal range of motion  No tracheal deviation present  No thyromegaly present  Cardiovascular: Normal rate, regular rhythm and normal heart sounds  Exam reveals no gallop and no friction rub  No murmur heard  Pulmonary/Chest: Effort normal and breath sounds normal  No respiratory distress  He has no wheezes  Abdominal: Soft  Bowel sounds are normal  He exhibits no mass  There is no tenderness  There is no guarding  Musculoskeletal: He exhibits no edema or deformity  Lymphadenopathy:     He has no cervical adenopathy  Neurological: He is alert and oriented to person, place, and time  No cranial nerve deficit  Skin: Skin is warm and dry  No rash noted  He is not diaphoretic  No erythema  Psychiatric: He has a normal mood and affect  Thought content normal          Vitals:    10/15/18 1511   BP: 110/72   Patient Position: Sitting   Weight: 64 kg (141 lb 3 2 oz)   Height: 5' 10" (1 778 m)       The following portions of the patient's history were reviewed and updated as appropriate:   He  has a past medical history of Arthritis of knee; 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 ([address] ); Avascular necrosis of hip ([address] ); Bursitis of hip, right; Cancer ([address] ); Chronic kidney disease, stage 3 ([address] ); Closed intracapsular fracture of femur ([address] ); Fracture of right hip ([address] ); Gout; High cholesterol; and Hypertension    He   Patient Active Problem List    Diagnosis Date Noted    Oral thrush 10/08/2018    Bilateral leg edema 10/05/2018    Cellulitis 10/05/2018    Hx of cardiac murmur 10/05/2018    ALEX (acute kidney injury) ([address] ) 10/05/2018    Elevated brain natriuretic peptide (BNP) level 10/05/2018     He  has a past surgical history that includes A-V cardiac pacemaker insertion (10/10/2012); Skin cancer excision; Hemorrhoid surgery; Joint replacement; Cataract extraction, extracapsular w/ intraocular lens implant (Bilateral); Hernia repair (1986); Coronary angioplasty with stent; Colostomy (1982); Revision Colostomy (1983); Hip surgery (Right, 04/17/2014); Cardiac pacemaker placement (2005); Popliteal artery angioplasty; Wrist fracture surgery (11/02/2011); and Colon surgery  His family history includes Diabetes type II in his father; No Known Problems in his mother  He  reports that he has quit smoking  He has never used smokeless tobacco  He reports that he does not drink alcohol or use drugs  Current Outpatient Prescriptions   Medication Sig Dispense Refill    aspirin 81 MG tablet Take 1 tablet by mouth daily      docusate sodium (COLACE) 100 mg capsule Take 1 capsule (100 mg total) by mouth 2 (two) times a day 10 capsule 0    lisinopril-hydrochlorothiazide (PRINZIDE,ZESTORETIC) 20-12 5 MG per tablet Take 1 tablet by mouth daily 30 tablet 5    metoprolol succinate (TOPROL-XL) 25 mg 24 hr tablet Take 1 tablet (25 mg total) by mouth daily 30 tablet 5    nystatin (MYCOSTATIN) 100,000 units/mL suspension Swish and swallow 5 mL (500,000 Units total) 4 (four) times a day 60 mL 0    pravastatin (PRAVACHOL) 40 mg tablet Take 1 tablet (40 mg total) by mouth daily at bedtime 30 tablet 5    traMADol (ULTRAM) 50 mg tablet Take 1 tablet (50 mg total) by mouth 2 (two) times a day as needed for moderate pain for up to 10 days 30 tablet 0     No current facility-administered medications for this visit        Current Outpatient Prescriptions on File Prior to Visit   Medication Sig    aspirin 81 MG tablet Take 1 tablet by mouth daily    docusate sodium (COLACE) 100 mg capsule Take 1 capsule (100 mg total) by mouth 2 (two) times a day    lisinopril-hydrochlorothiazide (PRINZIDE,ZESTORETIC) 20-12 5 MG per tablet Take 1 tablet by mouth daily    metoprolol succinate (TOPROL-XL) 25 mg 24 hr tablet Take 1 tablet (25 mg total) by mouth daily    nystatin (MYCOSTATIN) 100,000 units/mL suspension Swish and swallow 5 mL (500,000 Units total) 4 (four) times a day    pravastatin (PRAVACHOL) 40 mg tablet Take 1 tablet (40 mg total) by mouth daily at bedtime    traMADol (ULTRAM) 50 mg tablet Take 1 tablet (50 mg total) by mouth 2 (two) times a day as needed for moderate pain for up to 10 days     No current facility-administered medications on file prior to visit  He has No Known Allergies       Transitional Care Management Review:  Trevon Gutierrez is a 80 y o  male here for TCM follow up       During the TCM phone call patient stated:    Date and time hospital follow up call was made:  10/8/2018  8:15 AM  Patient was hopsitalized at:  [address] [facility]  Date of admission:  10/5/18  Date of discharge:  10/8/18  Diagnosis:  Bilateral leg adema; Acute Kidney Injury; Cellulitis  Disposition:  Home, Home health services  Were the patients medicaitons reviewed and updated:  No  Current symptoms:  None  Post hospital issues:  None  Scheduled for follow up?:  Yes (Comment: [facility] appt 10/11/18 at 2 pm)  Is transportation to your appointments needed:  No  I have advised the patient to call PCP with any new or worsening symptoms (please type in name along with any credentials):  Nico Nuñez  Living Arrangements:  Spouse or Significiant other  Support System:  Family  The type of support provided:  Emotional  Do you have social support:  Yes, as much as I need  Are you recieving home care services:  Yes  Types of home care services:  (Comment: [address])  Interperter language line required?:  No  Counseling:  (Comment: Arlene Wilson from [facility] [address])             Eugene Goss, DO

## 2018-10-16 DIAGNOSIS — I10 HYPERTENSION, UNSPECIFIED TYPE: Primary | ICD-10-CM

## 2018-10-16 DIAGNOSIS — I10 ESSENTIAL HYPERTENSION: ICD-10-CM

## 2018-10-16 RX ORDER — LISINOPRIL 5 MG/1
5 TABLET ORAL DAILY
Qty: 30 TABLET | Refills: 1 | Status: SHIPPED | OUTPATIENT
Start: 2018-10-16 | End: 2018-10-24 | Stop reason: HOSPADM

## 2018-10-16 NOTE — TELEPHONE ENCOUNTER
YOGESH FROM [facility] CALLED QUESTIONING THE DOSE CHANGE YOU MADE AT PT APPT  WAS TOLD TO CUT IT IN HALF FROM 10/12 5 TO 5/6 25  FAMILY COULD NOT CUT IN HALF, IT WOULD CRUMBLE  PER YOU VERBAL ORDER PT IS TO JUST TAKE 5 MG OF LISINOPRIL  RX SENT TO YOU TO SIGN OFF ON

## 2018-10-18 ENCOUNTER — LAB (OUTPATIENT)
Dept: LAB | Facility: MEDICAL CENTER | Age: 83
End: 2018-10-18
Payer: COMMERCIAL

## 2018-10-18 DIAGNOSIS — R09.89 LABILE HYPERTENSION: ICD-10-CM

## 2018-10-18 LAB
ANION GAP SERPL CALCULATED.3IONS-SCNC: 7 MMOL/L (ref 4–13)
BUN SERPL-MCNC: 44 MG/DL (ref 5–25)
CALCIUM SERPL-MCNC: 8.8 MG/DL (ref 8.3–10.1)
CHLORIDE SERPL-SCNC: 104 MMOL/L (ref 100–108)
CO2 SERPL-SCNC: 27 MMOL/L (ref 21–32)
CREAT SERPL-MCNC: 1.59 MG/DL (ref 0.6–1.3)
GFR SERPL CREATININE-BSD FRML MDRD: 36 ML/MIN/1.73SQ M
GLUCOSE P FAST SERPL-MCNC: 101 MG/DL (ref 65–99)
POTASSIUM SERPL-SCNC: 4.3 MMOL/L (ref 3.5–5.3)
SODIUM SERPL-SCNC: 138 MMOL/L (ref 136–145)

## 2018-10-18 PROCEDURE — 36415 COLL VENOUS BLD VENIPUNCTURE: CPT

## 2018-10-18 PROCEDURE — 80048 BASIC METABOLIC PNL TOTAL CA: CPT

## 2018-10-19 DIAGNOSIS — N18.30 STAGE 3 CHRONIC KIDNEY DISEASE (HCC): Primary | ICD-10-CM

## 2018-10-19 NOTE — PROGRESS NOTES
Please call the patient regarding his abnormal result   Kidney function is stable but he qualifies as chronic kidney disease disease stage 3 approaching stage IV so he needs to stay away from ibuprofen naproxen because these are not good for his kidneys we need to do kidney functions every 6 months make sure to talk to his son he will remember the conversation

## 2018-10-24 ENCOUNTER — TELEPHONE (OUTPATIENT)
Dept: FAMILY MEDICINE CLINIC | Facility: CLINIC | Age: 83
End: 2018-10-24

## 2018-10-24 DIAGNOSIS — I10 ESSENTIAL HYPERTENSION: ICD-10-CM

## 2018-10-24 DIAGNOSIS — M10.00 ACUTE IDIOPATHIC GOUT, UNSPECIFIED SITE: Primary | ICD-10-CM

## 2018-10-24 DIAGNOSIS — M1A.00X0 IDIOPATHIC CHRONIC GOUT WITHOUT TOPHUS, UNSPECIFIED SITE: Primary | ICD-10-CM

## 2018-10-24 RX ORDER — ALLOPURINOL 300 MG/1
TABLET ORAL
Qty: 30 TABLET | Refills: 3 | Status: ON HOLD | OUTPATIENT
Start: 2018-10-24 | End: 2018-10-27

## 2018-10-24 RX ORDER — COLCHICINE 0.6 MG/1
0.6 TABLET ORAL DAILY
Qty: 15 TABLET | Refills: 0 | Status: ON HOLD | OUTPATIENT
Start: 2018-10-24 | End: 2018-10-27

## 2018-10-24 NOTE — TELEPHONE ENCOUNTER
1   CC: Swollen Right index finger - S/S Red, warm, & tender to touch  Hx Gout - Hx Allopurinol Does  want to Rx? Appears to be gouty arthritis  2   Rx'd Cephalexin 250mg 1 q8hrs x 5 days (10/8/18) & Cephalexin 500mg 1 q6hrs x 10 days (10/04/2018)     Pt took 500mg dose incorrectly TID & has tabs remaining  Does Pt need to finish Rx as he has taken it for 10 days as recommended?

## 2018-10-24 NOTE — TELEPHONE ENCOUNTER
Amilcar Ayoub called back, his blood pressure is low, sitting 102/60 and standing 80/40, without symptoms

## 2018-10-24 NOTE — TELEPHONE ENCOUNTER
Prescription for allopurinol has been sent to the drugstore patient should finish out his cephalexin he can just take it 3 times daily I will also send additional prescription for colchicine which will help with the acute inflammation

## 2018-10-26 ENCOUNTER — TELEPHONE (OUTPATIENT)
Dept: FAMILY MEDICINE CLINIC | Facility: CLINIC | Age: 83
End: 2018-10-26

## 2018-10-26 DIAGNOSIS — L89.212 DECUBITUS ULCER OF RIGHT HIP, STAGE 2 (HCC): Primary | ICD-10-CM

## 2018-10-26 NOTE — TELEPHONE ENCOUNTER
No problem wound care is not a problem now are they going to come in with their own wound care or should we refer him out to [facility] whichever works best

## 2018-10-26 NOTE — TELEPHONE ENCOUNTER
His right hip wound is worse  On initial consult, the wound was necrotic, measuring 2 5 x 2 5 x 0   The son is doing the dressing changes, and when she went yesterday the dressing was off and it is now a stage 2  It measured 4 5 x 4 x 0 1 no odor with serious drainage    Requesting an order for wound care

## 2018-10-27 ENCOUNTER — APPOINTMENT (EMERGENCY)
Dept: RADIOLOGY | Facility: HOSPITAL | Age: 83
DRG: 463 | End: 2018-10-27
Payer: COMMERCIAL

## 2018-10-27 ENCOUNTER — HOSPITAL ENCOUNTER (INPATIENT)
Facility: HOSPITAL | Age: 83
LOS: 9 days | Discharge: NURSING, REHAB OR LONG-TERM CARE | DRG: 463 | End: 2018-11-05
Attending: EMERGENCY MEDICINE | Admitting: INTERNAL MEDICINE
Payer: COMMERCIAL

## 2018-10-27 ENCOUNTER — APPOINTMENT (INPATIENT)
Dept: CT IMAGING | Facility: HOSPITAL | Age: 83
DRG: 463 | End: 2018-10-27
Payer: COMMERCIAL

## 2018-10-27 DIAGNOSIS — R60.0 BILATERAL LEG EDEMA: ICD-10-CM

## 2018-10-27 DIAGNOSIS — E88.09 HYPOALBUMINEMIA: Primary | ICD-10-CM

## 2018-10-27 DIAGNOSIS — S71.001A: ICD-10-CM

## 2018-10-27 DIAGNOSIS — E43 SEVERE PROTEIN-CALORIE MALNUTRITION (HCC): ICD-10-CM

## 2018-10-27 DIAGNOSIS — Z96.9 RETAINED ORTHOPEDIC HARDWARE: ICD-10-CM

## 2018-10-27 DIAGNOSIS — I10 ESSENTIAL HYPERTENSION: ICD-10-CM

## 2018-10-27 DIAGNOSIS — R93.0 ABNORMAL CT SCAN OF HEAD: ICD-10-CM

## 2018-10-27 PROBLEM — E78.00 HYPERCHOLESTEROLEMIA: Status: ACTIVE | Noted: 2018-10-27

## 2018-10-27 PROBLEM — R26.81 GAIT INSTABILITY: Status: ACTIVE | Noted: 2018-10-27

## 2018-10-27 PROBLEM — N18.9 CKD (CHRONIC KIDNEY DISEASE): Status: ACTIVE | Noted: 2018-10-27

## 2018-10-27 PROBLEM — E87.0 HYPERNATREMIA: Status: ACTIVE | Noted: 2018-10-27

## 2018-10-27 PROBLEM — E87.2 LACTIC ACIDOSIS: Status: ACTIVE | Noted: 2018-10-27

## 2018-10-27 LAB
ALBUMIN SERPL BCP-MCNC: 3 G/DL (ref 3.5–5)
ALP SERPL-CCNC: 116 U/L (ref 46–116)
ALT SERPL W P-5'-P-CCNC: 9 U/L (ref 12–78)
ANION GAP SERPL CALCULATED.3IONS-SCNC: 10 MMOL/L (ref 4–13)
AST SERPL W P-5'-P-CCNC: 16 U/L (ref 5–45)
BASOPHILS # BLD AUTO: 0.07 THOUSANDS/ΜL (ref 0–0.1)
BASOPHILS NFR BLD AUTO: 1 % (ref 0–1)
BILIRUB SERPL-MCNC: 0.6 MG/DL (ref 0.2–1)
BUN SERPL-MCNC: 21 MG/DL (ref 5–25)
CALCIUM SERPL-MCNC: 8.8 MG/DL (ref 8.3–10.1)
CHLORIDE SERPL-SCNC: 110 MMOL/L (ref 100–108)
CO2 SERPL-SCNC: 27 MMOL/L (ref 21–32)
CREAT SERPL-MCNC: 1.39 MG/DL (ref 0.6–1.3)
EOSINOPHIL # BLD AUTO: 0.18 THOUSAND/ΜL (ref 0–0.61)
EOSINOPHIL NFR BLD AUTO: 3 % (ref 0–6)
ERYTHROCYTE [DISTWIDTH] IN BLOOD BY AUTOMATED COUNT: 13.8 % (ref 11.6–15.1)
GFR SERPL CREATININE-BSD FRML MDRD: 42 ML/MIN/1.73SQ M
GLUCOSE SERPL-MCNC: 99 MG/DL (ref 65–140)
HCT VFR BLD AUTO: 42.6 % (ref 36.5–49.3)
HGB BLD-MCNC: 13.1 G/DL (ref 12–17)
IMM GRANULOCYTES # BLD AUTO: 0.02 THOUSAND/UL (ref 0–0.2)
IMM GRANULOCYTES NFR BLD AUTO: 0 % (ref 0–2)
LACTATE SERPL-SCNC: 0.8 MMOL/L (ref 0.5–2)
LACTATE SERPL-SCNC: 2.5 MMOL/L (ref 0.5–2)
LYMPHOCYTES # BLD AUTO: 1.13 THOUSANDS/ΜL (ref 0.6–4.47)
LYMPHOCYTES NFR BLD AUTO: 16 % (ref 14–44)
MCH RBC QN AUTO: 29.2 PG (ref 26.8–34.3)
MCHC RBC AUTO-ENTMCNC: 30.8 G/DL (ref 31.4–37.4)
MCV RBC AUTO: 95 FL (ref 82–98)
MONOCYTES # BLD AUTO: 0.68 THOUSAND/ΜL (ref 0.17–1.22)
MONOCYTES NFR BLD AUTO: 10 % (ref 4–12)
NEUTROPHILS # BLD AUTO: 5.05 THOUSANDS/ΜL (ref 1.85–7.62)
NEUTS SEG NFR BLD AUTO: 70 % (ref 43–75)
NRBC BLD AUTO-RTO: 0 /100 WBCS
PLATELET # BLD AUTO: 279 THOUSANDS/UL (ref 149–390)
PMV BLD AUTO: 11.1 FL (ref 8.9–12.7)
POTASSIUM SERPL-SCNC: 3.8 MMOL/L (ref 3.5–5.3)
PROCALCITONIN SERPL-MCNC: <0.05 NG/ML
PROT SERPL-MCNC: 6.6 G/DL (ref 6.4–8.2)
RBC # BLD AUTO: 4.48 MILLION/UL (ref 3.88–5.62)
SODIUM SERPL-SCNC: 147 MMOL/L (ref 136–145)
WBC # BLD AUTO: 7.13 THOUSAND/UL (ref 4.31–10.16)

## 2018-10-27 PROCEDURE — 80053 COMPREHEN METABOLIC PANEL: CPT | Performed by: EMERGENCY MEDICINE

## 2018-10-27 PROCEDURE — 73700 CT LOWER EXTREMITY W/O DYE: CPT

## 2018-10-27 PROCEDURE — 83605 ASSAY OF LACTIC ACID: CPT | Performed by: INTERNAL MEDICINE

## 2018-10-27 PROCEDURE — 87040 BLOOD CULTURE FOR BACTERIA: CPT | Performed by: EMERGENCY MEDICINE

## 2018-10-27 PROCEDURE — 85025 COMPLETE CBC W/AUTO DIFF WBC: CPT | Performed by: EMERGENCY MEDICINE

## 2018-10-27 PROCEDURE — 87081 CULTURE SCREEN ONLY: CPT | Performed by: INTERNAL MEDICINE

## 2018-10-27 PROCEDURE — 99223 1ST HOSP IP/OBS HIGH 75: CPT | Performed by: INTERNAL MEDICINE

## 2018-10-27 PROCEDURE — 99284 EMERGENCY DEPT VISIT MOD MDM: CPT

## 2018-10-27 PROCEDURE — 36415 COLL VENOUS BLD VENIPUNCTURE: CPT | Performed by: EMERGENCY MEDICINE

## 2018-10-27 PROCEDURE — 73502 X-RAY EXAM HIP UNI 2-3 VIEWS: CPT

## 2018-10-27 PROCEDURE — 84145 PROCALCITONIN (PCT): CPT | Performed by: INTERNAL MEDICINE

## 2018-10-27 PROCEDURE — 96374 THER/PROPH/DIAG INJ IV PUSH: CPT

## 2018-10-27 PROCEDURE — 83605 ASSAY OF LACTIC ACID: CPT | Performed by: EMERGENCY MEDICINE

## 2018-10-27 RX ORDER — ACETAMINOPHEN 325 MG/1
650 TABLET ORAL EVERY 6 HOURS PRN
Status: DISCONTINUED | OUTPATIENT
Start: 2018-10-27 | End: 2018-11-05 | Stop reason: HOSPADM

## 2018-10-27 RX ORDER — CEPHALEXIN 250 MG/1
250 CAPSULE ORAL EVERY 8 HOURS SCHEDULED
COMMUNITY
End: 2018-11-05 | Stop reason: HOSPADM

## 2018-10-27 RX ORDER — ASPIRIN 81 MG/1
81 TABLET, CHEWABLE ORAL DAILY
Status: DISCONTINUED | OUTPATIENT
Start: 2018-10-27 | End: 2018-11-05 | Stop reason: HOSPADM

## 2018-10-27 RX ORDER — TRAMADOL HYDROCHLORIDE 50 MG/1
50 TABLET ORAL EVERY 6 HOURS PRN
COMMUNITY
End: 2018-11-05 | Stop reason: HOSPADM

## 2018-10-27 RX ORDER — DOCUSATE SODIUM 100 MG/1
100 CAPSULE, LIQUID FILLED ORAL 2 TIMES DAILY
Status: DISCONTINUED | OUTPATIENT
Start: 2018-10-27 | End: 2018-11-05 | Stop reason: HOSPADM

## 2018-10-27 RX ORDER — METOPROLOL SUCCINATE 25 MG/1
25 TABLET, EXTENDED RELEASE ORAL DAILY
Status: DISCONTINUED | OUTPATIENT
Start: 2018-10-27 | End: 2018-11-05 | Stop reason: HOSPADM

## 2018-10-27 RX ORDER — SODIUM CHLORIDE 450 MG/100ML
50 INJECTION, SOLUTION INTRAVENOUS CONTINUOUS
Status: DISCONTINUED | OUTPATIENT
Start: 2018-10-27 | End: 2018-10-29

## 2018-10-27 RX ORDER — PRAVASTATIN SODIUM 40 MG
40 TABLET ORAL
Status: DISCONTINUED | OUTPATIENT
Start: 2018-10-27 | End: 2018-10-29

## 2018-10-27 RX ORDER — HEPARIN SODIUM 5000 [USP'U]/ML
5000 INJECTION, SOLUTION INTRAVENOUS; SUBCUTANEOUS EVERY 8 HOURS SCHEDULED
Status: DISCONTINUED | OUTPATIENT
Start: 2018-10-27 | End: 2018-11-05 | Stop reason: HOSPADM

## 2018-10-27 RX ORDER — ONDANSETRON 2 MG/ML
4 INJECTION INTRAMUSCULAR; INTRAVENOUS EVERY 4 HOURS PRN
Status: DISCONTINUED | OUTPATIENT
Start: 2018-10-27 | End: 2018-11-05 | Stop reason: HOSPADM

## 2018-10-27 RX ORDER — FENTANYL CITRATE 50 UG/ML
25 INJECTION, SOLUTION INTRAMUSCULAR; INTRAVENOUS ONCE
Status: COMPLETED | OUTPATIENT
Start: 2018-10-27 | End: 2018-10-27

## 2018-10-27 RX ORDER — LISINOPRIL 5 MG/1
5 TABLET ORAL DAILY
COMMUNITY
End: 2018-11-05 | Stop reason: HOSPADM

## 2018-10-27 RX ORDER — LISINOPRIL 5 MG/1
5 TABLET ORAL DAILY
Status: DISCONTINUED | OUTPATIENT
Start: 2018-10-27 | End: 2018-10-28

## 2018-10-27 RX ORDER — HYDROMORPHONE HCL/PF 1 MG/ML
0.2 SYRINGE (ML) INJECTION
Status: DISCONTINUED | OUTPATIENT
Start: 2018-10-27 | End: 2018-11-05

## 2018-10-27 RX ORDER — OXYCODONE HYDROCHLORIDE 5 MG/1
5 TABLET ORAL EVERY 4 HOURS PRN
Status: DISCONTINUED | OUTPATIENT
Start: 2018-10-27 | End: 2018-11-05 | Stop reason: HOSPADM

## 2018-10-27 RX ADMIN — METOPROLOL SUCCINATE 25 MG: 25 TABLET, EXTENDED RELEASE ORAL at 12:34

## 2018-10-27 RX ADMIN — PRAVASTATIN SODIUM 40 MG: 40 TABLET ORAL at 21:13

## 2018-10-27 RX ADMIN — DOCUSATE SODIUM 100 MG: 100 CAPSULE, LIQUID FILLED ORAL at 17:57

## 2018-10-27 RX ADMIN — LISINOPRIL 5 MG: 5 TABLET ORAL at 12:34

## 2018-10-27 RX ADMIN — HEPARIN SODIUM 5000 UNITS: 5000 INJECTION, SOLUTION INTRAVENOUS; SUBCUTANEOUS at 14:06

## 2018-10-27 RX ADMIN — HYDROMORPHONE HYDROCHLORIDE 0.2 MG: 1 INJECTION, SOLUTION INTRAMUSCULAR; INTRAVENOUS; SUBCUTANEOUS at 13:29

## 2018-10-27 RX ADMIN — SODIUM CHLORIDE 75 ML/HR: 0.45 INJECTION, SOLUTION INTRAVENOUS at 15:17

## 2018-10-27 RX ADMIN — CEFAZOLIN SODIUM 1000 MG: 1 SOLUTION INTRAVENOUS at 23:36

## 2018-10-27 RX ADMIN — DOCUSATE SODIUM 100 MG: 100 CAPSULE, LIQUID FILLED ORAL at 12:34

## 2018-10-27 RX ADMIN — SODIUM CHLORIDE 500 ML: 0.9 INJECTION, SOLUTION INTRAVENOUS at 12:36

## 2018-10-27 RX ADMIN — FENTANYL CITRATE 25 MCG: 50 INJECTION INTRAMUSCULAR; INTRAVENOUS at 10:06

## 2018-10-27 RX ADMIN — ASPIRIN 81 MG 81 MG: 81 TABLET ORAL at 12:34

## 2018-10-27 RX ADMIN — CEFAZOLIN SODIUM 1000 MG: 1 SOLUTION INTRAVENOUS at 12:35

## 2018-10-27 RX ADMIN — HEPARIN SODIUM 5000 UNITS: 5000 INJECTION, SOLUTION INTRAVENOUS; SUBCUTANEOUS at 21:14

## 2018-10-27 NOTE — ASSESSMENT & PLAN NOTE
· Normal saline IV fluid bolus of 500 mL over 2 hours  · Recheck the lactic acid level after the normal saline IV fluid bolus  · Check and follow the procalcitonin

## 2018-10-27 NOTE — PLAN OF CARE
DISCHARGE PLANNING     Discharge to home or other facility with appropriate resources Progressing        INFECTION - ADULT     Absence or prevention of progression during hospitalization Progressing        Knowledge Deficit     Patient/family/caregiver demonstrates understanding of disease process, treatment plan, medications, and discharge instructions Progressing        METABOLIC, FLUID AND ELECTROLYTES - ADULT     Electrolytes maintained within normal limits Progressing     Fluid balance maintained Progressing        MUSCULOSKELETAL - ADULT     Maintain or return mobility to safest level of function Progressing        Nutrition/Hydration-ADULT     Nutrient/Hydration intake appropriate for improving, restoring or maintaining nutritional needs Progressing        PAIN - ADULT     Verbalizes/displays adequate comfort level or baseline comfort level Progressing        Potential for Falls     Patient will remain free of falls Progressing        Prexisting or High Potential for Compromised Skin Integrity     Skin integrity is maintained or improved Progressing        SAFETY ADULT     Maintain or return to baseline ADL function Progressing     Maintain or return mobility status to optimal level Progressing     Patient will remain free of falls Progressing        SKIN/TISSUE INTEGRITY - ADULT     Skin integrity remains intact Progressing     Incision(s), wounds(s) or drain site(s) healing without S/S of infection Progressing     Oral mucous membranes remain intact Progressing

## 2018-10-27 NOTE — ASSESSMENT & PLAN NOTE
· The patient is experiencing right hip pain and ambulatory dysfunction secondary to the open wound of the right hip  · Consult PT/OT  · He may require short-term rehabilitation placement upon discharge

## 2018-10-27 NOTE — ED PROVIDER NOTES
Pt Name: Cristy Prajapati  MRN: [id number]  [place] 5/13/1921  Age/Sex: 80 y o  male  Date of evaluation: 10/27/2018  PCP: Ethan Bonilla, [address] [place] [address]    Chief Complaint   Patient presents with    Wound Check     Patient has pressure ulcer on right hip, complains of pain at wound site         HPI    Illene Hallmark presents to the Emergency Department complaining of right hip pain  He has hardware in that hip that he is concerned that the screws are coming out  He favors that side and ends up laying on it without realizing it  He does have a visiting nurse coming into the home and he has been on oral abx but the pain is getting worse  Yesterday his son was working on trying to get wound care services in the home          HPI      Past Medical and Surgical History    Past Medical History:   Diagnosis Date    Arthritis of knee     last assessed 11/7/14, resolved 10/2/17     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 ([facility] [address] [facility] )     last assessed 9/9/14, resolved 10/2/17     Avascular necrosis of hip ([address] )     last assessed 8/9/16, resolved 10/2/17     Bursitis of hip, right     last assessed 8/12/16, resolved 10/2/17     Cancer ([address] )     skin    Chronic kidney disease, stage 3 ([address] )     Closed intracapsular fracture of femur ([address] )     last assessed 8/9/16, resolved 10/2/17    Fracture of right hip ([address] )     Gout     last assessed 12/24/13, resolved 10/2/17     High cholesterol     Hypertension        Past Surgical History:   Procedure Laterality Date    A-V CARDIAC PACEMAKER INSERTION  10/10/2012    CARDIAC PACEMAKER PLACEMENT  2005    CATARACT EXTRACTION EXTRACAPSULAR W/ INTRAOCULAR LENS IMPLANTATION Bilateral     COLON SURGERY      COLOSTOMY  1982    CORONARY ANGIOPLASTY WITH STENT PLACEMENT      stent 1 Gradient, RLE Anteriorogram Poss Plasty Stent     HEMORRHOID SURGERY      HERNIA REPAIR  1986    bilat inguinal    HIP SURGERY Right 04/17/2014    Pin placed in hip due to fracture Dr Pollard    JOINT REPLACEMENT      R hip    POPLITEAL ARTERY ANGIOPLASTY      Femoral - Popliteal     REVISION COLOSTOMY  1983    SKIN CANCER EXCISION      WRIST FRACTURE SURGERY  11/02/2011       Family History   Problem Relation Age of Onset    No Known Problems Mother     Diabetes type II Father        Social History   Substance Use Topics    Smoking status: Former Smoker    Smokeless tobacco: Never Used    Alcohol use No              Allergies    No Known Allergies    Home Medications    Prior to Admission medications    Medication Sig Start Date End Date Taking? Authorizing Provider   allopurinol (ZYLOPRIM) 300 mg tablet 1/2 tablet daily for the 1st 2 weeks then increase to a full tablet 10/24/18   Melissa Swenson, DO   aspirin 81 MG tablet Take 1 tablet by mouth daily    Historical Provider, MD   colchicine (COLCRYS) 0 6 mg tablet Take 1 tablet (0 6 mg total) by mouth daily 10/24/18   Melissa Swenson, DO   docusate sodium (COLACE) 100 mg capsule Take 1 capsule (100 mg total) by mouth 2 (two) times a day 10/8/18   Butch Folds, DO   lisinopril-hydrochlorothiazide (PRINZIDE,ZESTORETIC) 20-12 5 MG per tablet Take 1 tablet by mouth daily 5/1/18   Melissa Swenson, DO   metoprolol succinate (TOPROL-XL) 25 mg 24 hr tablet Take 1 tablet (25 mg total) by mouth daily 10/9/18   Melissa Swenson, DO   nystatin (MYCOSTATIN) 100,000 units/mL suspension Swish and swallow 5 mL (500,000 Units total) 4 (four) times a day 10/8/18   Butch Folds, DO   pravastatin (PRAVACHOL) 40 mg tablet Take 1 tablet (40 mg total) by mouth daily at bedtime 10/9/18   Melissa Swenson, DO           Review of Systems    Review of Systems   Constitutional: Negative for activity change, appetite change, chills, fatigue and fever  HENT: Negative for congestion, rhinorrhea, sinus pressure, sneezing, sore throat and trouble swallowing  Eyes: Negative for photophobia and visual disturbance     Respiratory: Negative for chest tightness, shortness of breath and wheezing  Cardiovascular: Negative for chest pain and leg swelling  Gastrointestinal: Negative for abdominal distention, abdominal pain, constipation, diarrhea, nausea and vomiting  Endocrine: Negative for polydipsia, polyphagia and polyuria  Genitourinary: Negative for decreased urine volume, difficulty urinating, dysuria, flank pain, frequency and urgency  Musculoskeletal: Negative for back pain, gait problem, joint swelling and neck pain  Skin: Negative for color change, pallor and rash  Allergic/Immunologic: Negative for immunocompromised state  Neurological: Negative for seizures, syncope, speech difficulty, weakness, light-headedness and headaches  Psychiatric/Behavioral: Negative for confusion  All other systems reviewed and are negative  Physical Exam      ED Triage Vitals [10/27/18 0819]   Temperature Pulse Respirations Blood Pressure SpO2   (!) 97 4 °F (36 3 °C) 84 18 140/75 96 %      Temp Source Heart Rate Source Patient Position - Orthostatic VS BP Location FiO2 (%)   Temporal Monitor Lying Left arm --      Pain Score       Worst Possible Pain               Physical Exam   Constitutional: He is oriented to person, place, and time  He appears well-developed and well-nourished  No distress  HENT:   Head: Normocephalic and atraumatic  Nose: Nose normal    Mouth/Throat: Oropharynx is clear and moist    Eyes: Pupils are equal, round, and reactive to light  Conjunctivae and EOM are normal    Neck: Normal range of motion  Neck supple  Cardiovascular: Normal rate, regular rhythm and normal heart sounds  Exam reveals no gallop and no friction rub  No murmur heard  Pulmonary/Chest: Effort normal and breath sounds normal  No respiratory distress  He has no wheezes  He has no rales  Abdominal: Soft  Bowel sounds are normal  There is no tenderness  There is no rebound and no guarding  Musculoskeletal: Normal range of motion     Neurological: He is alert and oriented to person, place, and time  Skin: Skin is warm and dry  He is not diaphoretic  Psychiatric: He has a normal mood and affect  His behavior is normal    Nursing note and vitals reviewed  Assessment and Plan    Linn Nguyen is a 80 y o  male who presents with hip wound  Physical examination remarkable for same  Differential diagnosis (not completely inclusive) includes worsening infection vs poor would healing  Plan will be to perform diagnostic testing and treat symptomatically        MDM    Diagnostic Results      Labs:    Results for orders placed or performed during the hospital encounter of 10/27/18   CBC and differential   Result Value Ref Range    WBC 7 13 4 31 - 10 16 Thousand/uL    RBC 4 48 3 88 - 5 62 Million/uL    Hemoglobin 13 1 12 0 - 17 0 g/dL    Hematocrit 42 6 36 5 - 49 3 %    MCV 95 82 - 98 fL    MCH 29 2 26 8 - 34 3 pg    MCHC 30 8 (L) 31 4 - 37 4 g/dL    RDW 13 8 11 6 - 15 1 %    MPV 11 1 8 9 - 12 7 fL    Platelets 899 696 - 765 Thousands/uL    nRBC 0 /100 WBCs    Neutrophils Relative 70 43 - 75 %    Immat GRANS % 0 0 - 2 %    Lymphocytes Relative 16 14 - 44 %    Monocytes Relative 10 4 - 12 %    Eosinophils Relative 3 0 - 6 %    Basophils Relative 1 0 - 1 %    Neutrophils Absolute 5 05 1 85 - 7 62 Thousands/µL    Immature Grans Absolute 0 02 0 00 - 0 20 Thousand/uL    Lymphocytes Absolute 1 13 0 60 - 4 47 Thousands/µL    Monocytes Absolute 0 68 0 17 - 1 22 Thousand/µL    Eosinophils Absolute 0 18 0 00 - 0 61 Thousand/µL    Basophils Absolute 0 07 0 00 - 0 10 Thousands/µL   Comprehensive metabolic panel   Result Value Ref Range    Sodium 147 (H) 136 - 145 mmol/L    Potassium 3 8 3 5 - 5 3 mmol/L    Chloride 110 (H) 100 - 108 mmol/L    CO2 27 21 - 32 mmol/L    ANION GAP 10 4 - 13 mmol/L    BUN 21 5 - 25 mg/dL    Creatinine 1 39 (H) 0 60 - 1 30 mg/dL    Glucose 99 65 - 140 mg/dL    Calcium 8 8 8 3 - 10 1 mg/dL    AST 16 5 - 45 U/L    ALT 9 (L) 12 - 78 U/L    Alkaline Phosphatase 116 46 - 116 U/L    Total Protein 6 6 6 4 - 8 2 g/dL    Albumin 3 0 (L) 3 5 - 5 0 g/dL    Total Bilirubin 0 60 0 20 - 1 00 mg/dL    eGFR 42 ml/min/1 73sq m   Lactic acid, plasma   Result Value Ref Range    LACTIC ACID 2 5 (HH) 0 5 - 2 0 mmol/L       All labs reviewed and utilized in the medical decision making process    Radiology:    XR hip/pelv 2-3 vws right    (Results Pending)   CT hip right wo contrast    (Results Pending)     Screws in place  No active disease    All radiology studies independently viewed by me and interpreted by the radiologist     Procedure    Procedures    CritCare Time      ED Course of Care and Re-Assessments          Medications   sodium chloride 0 9 % bolus 500 mL (not administered)   sodium chloride infusion 0 45 % (not administered)   ceFAZolin (ANCEF) IVPB (premix) 1,000 mg (not administered)   acetaminophen (TYLENOL) tablet 650 mg (not administered)   oxyCODONE (ROXICODONE) IR tablet 5 mg (not administered)   HYDROmorphone (DILAUDID) injection 0 2 mg (not administered)   ondansetron (ZOFRAN) injection 4 mg (not administered)   heparin (porcine) subcutaneous injection 5,000 Units (not administered)   aspirin tablet 81 mg (not administered)   docusate sodium (COLACE) capsule 100 mg (not administered)   lisinopril (ZESTRIL) tablet 5 mg (not administered)   metoprolol succinate (TOPROL-XL) 24 hr tablet 25 mg (not administered)   pravastatin (PRAVACHOL) tablet 40 mg (not administered)   fentanyl citrate (PF) 100 MCG/2ML 25 mcg (25 mcg Intravenous Given 10/27/18 1006)           FINAL IMPRESSION    Final diagnoses:   Open wound of right hip         DISPOSITION/PLAN    Time reflects when diagnosis was documented in both MDM as applicable and the Disposition within this note     Time User Action Codes Description Comment    10/27/2018 10:19 AM Jennifer BARTH Add [[id number]] Open wound of right hip     10/27/2018 11:34 AM Hostetter, Worley Closs Add [[id number]] Gait instability 10/27/2018 11:34 AM MeaghanAriana valle [[id number]] Gait instability       ED Disposition     ED Disposition Condition Comment    Admit  Case was discussed with HARMAN and the patient's admission status was agreed to be Admission Status: inpatient status to the service of Dr Pilar Mane   Follow-up Information    None           PATIENT REFERRED TO:    No follow-up provider specified  DISCHARGE MEDICATIONS:    Current Discharge Medication List      CONTINUE these medications which have NOT CHANGED    Details   aspirin 81 MG tablet Take 1 tablet by mouth daily      cephalexin (KEFLEX) 250 mg capsule Take 250 mg by mouth every 8 (eight) hours        docusate sodium (COLACE) 100 mg capsule Take 1 capsule (100 mg total) by mouth 2 (two) times a day  Qty: 10 capsule, Refills: 0    Associated Diagnoses: Bilateral leg edema      lisinopril (ZESTRIL) 5 mg tablet Take 5 mg by mouth daily      metoprolol succinate (TOPROL-XL) 25 mg 24 hr tablet Take 1 tablet (25 mg total) by mouth daily  Qty: 30 tablet, Refills: 5    Associated Diagnoses: Essential hypertension      nystatin (MYCOSTATIN) 100,000 units/mL suspension Swish and swallow 5 mL (500,000 Units total) 4 (four) times a day  Qty: 60 mL, Refills: 0    Associated Diagnoses: Cellulitis of right lower extremity      pravastatin (PRAVACHOL) 40 mg tablet Take 1 tablet (40 mg total) by mouth daily at bedtime  Qty: 30 tablet, Refills: 5    Associated Diagnoses: Mixed hyperlipidemia      traMADol (ULTRAM) 50 mg tablet Take 50 mg by mouth every 6 (six) hours as needed for moderate pain             No discharge procedures on file           Cally Dixon, [address] [place] [address],   10/27/18 7945

## 2018-10-27 NOTE — ASSESSMENT & PLAN NOTE
· Continue toprol XL and lisinopril  · Follow the blood pressure trend  · Outpatient follow-up with his PCP in regards to this matter

## 2018-10-27 NOTE — ASSESSMENT & PLAN NOTE
· Admit to med/surg level of care  · With surrounding cellulitis  · History of right hip surgery in 2014 by Dr Georgie Gottlieb (Orthopedic Surgery)  · IV cefazolin  · Check a MRSA nasal screen  · Pain control  · PT/OT  · Check a CT scan of the right hip without contrast  · Consult the wound care team

## 2018-10-27 NOTE — ASSESSMENT & PLAN NOTE
· After the normal saline IV fluid bolus of 500 mL is completed, he will be on 1/2 normal saline IV fluids at 75 ml/hr  · Follow the sodium level

## 2018-10-27 NOTE — H&P
H&P- G[place] 5/13/1921, 80 y o  male MRN: [id number]    Unit/Bed#: 427-01 Encounter: [id number]    Primary Care Provider: Andreas Chen DO   Date and time admitted to hospital: 10/27/2018  8:17 AM        * Open wound of right hip   Assessment & Plan    · Admit to med/surg level of care  · With surrounding cellulitis  · History of right hip surgery in 2014 by Dr Emelyn Onofre (Orthopedic Surgery)  · IV cefazolin  · Check a MRSA nasal screen  · Pain control  · PT/OT  · Check a CT scan of the right hip without contrast  · Consult the wound care team     Gait instability   Assessment & Plan    · The patient is experiencing right hip pain and ambulatory dysfunction secondary to the open wound of the right hip  · Consult PT/OT  · He may require short-term rehabilitation placement upon discharge  Hypercholesterolemia   Assessment & Plan    · Continue statin therapy     Essential hypertension   Assessment & Plan    · Continue toprol XL and lisinopril  · Follow the blood pressure trend  · Outpatient follow-up with his PCP in regards to this matter     Lactic acidosis   Assessment & Plan    · Normal saline IV fluid bolus of 500 mL over 2 hours  · Recheck the lactic acid level after the normal saline IV fluid bolus  · Check and follow the procalcitonin     Hypernatremia   Assessment & Plan    · After the normal saline IV fluid bolus of 500 mL is completed, he will be on 1/2 normal saline IV fluids at 75 ml/hr  · Follow the sodium level         VTE Prophylaxis: Heparin  / sequential compression device   Code Status:  Level 1-Full Code      Anticipated Length of Stay:  Patient will be admitted on an Inpatient basis with an anticipated length of stay of greater than 2 midnights  Justification for Hospital Stay:  The patient requires IV antibiotics, continuous IV fluids, a CT scan of the right hip without contrast, and PT/OT evaluations      Total Time for Visit, including Counseling / Coordination of Care: 30 minutes  Greater than 50% of this total time spent on direct patient counseling and coordination of care  Chief Complaint:  Open wound of right hip and right hip pain    History of Present Illness: The patient is a 80year-old male who presented to the emergency department with the complaint of an open wound his right hip as well as right hip pain  The patient had right hip surgery by Dr Luciana Pop (Orthopedic Surgery) for a right hip fracture and avascular necrosis of the right hip  The patient developed a chronic, right lateral hip wound over the last several months  Over the last few days, the open wound of the right hip developed surrounding erythema as well as a purulent drainage  When the patient woke up today, 10/27/2018, the patient was experiencing severe right hip pain at the site of the open wound  The patient is currently unable to ambulate secondary to the severe right hip pain  No fevers or chills  No chest pain  No shortness of breath  No abdominal pain  No nausea or vomiting  Review of Systems:    Review of Systems:  Per HPI, all other systems have been reviewed and were negative      Past Medical and Surgical History:     Past Medical History:   Diagnosis Date    Arthritis of knee     last assessed 11/7/14, resolved 10/2/17     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 ([address] )     last assessed 9/9/14, resolved 10/2/17     Avascular necrosis of hip ([address] )     last assessed 8/9/16, resolved 10/2/17     Bursitis of hip, right     last assessed 8/12/16, resolved 10/2/17     Cancer ([address] )     skin    Chronic kidney disease, stage 3 ([address] )     Closed intracapsular fracture of femur ([address] )     last assessed 8/9/16, resolved 10/2/17    Fracture of right hip ([address] )     Gout     last assessed 12/24/13, resolved 10/2/17     High cholesterol     Hypertension        Past Surgical History:   Procedure Laterality Date    A-V CARDIAC PACEMAKER INSERTION 10/10/2012    CARDIAC PACEMAKER PLACEMENT  2005    CATARACT EXTRACTION EXTRACAPSULAR W/ INTRAOCULAR LENS IMPLANTATION Bilateral     COLON SURGERY      COLOSTOMY  1982    CORONARY ANGIOPLASTY WITH STENT PLACEMENT      stent 1 Gradient, RLE Anteriorogram Poss Plasty Stent     HEMORRHOID SURGERY      HERNIA REPAIR  1986    bilat inguinal    HIP SURGERY Right 04/17/2014    Pin placed in hip due to fracture Dr Pollard    JOINT REPLACEMENT      R hip    POPLITEAL ARTERY ANGIOPLASTY      Femoral - Popliteal     REVISION COLOSTOMY  1983    SKIN CANCER EXCISION      WRIST FRACTURE SURGERY  11/02/2011       Meds/Allergies:    Prior to Admission medications    Medication Sig Start Date End Date Taking?  Authorizing Provider   aspirin 81 MG tablet Take 1 tablet by mouth daily   Yes Historical Provider, MD   cephalexin (KEFLEX) 250 mg capsule Take 250 mg by mouth every 8 (eight) hours     Yes Historical Provider, MD   docusate sodium (COLACE) 100 mg capsule Take 1 capsule (100 mg total) by mouth 2 (two) times a day 10/8/18  Yes Delbert Siddiqi DO   lisinopril (ZESTRIL) 5 mg tablet Take 5 mg by mouth daily   Yes Historical Provider, MD   metoprolol succinate (TOPROL-XL) 25 mg 24 hr tablet Take 1 tablet (25 mg total) by mouth daily 10/9/18  Yes Marilyn Pritchett DO   nystatin (MYCOSTATIN) 100,000 units/mL suspension Swish and swallow 5 mL (500,000 Units total) 4 (four) times a day 10/8/18  Yes Delbert Siddiqi DO   pravastatin (PRAVACHOL) 40 mg tablet Take 1 tablet (40 mg total) by mouth daily at bedtime 10/9/18  Yes Marilyn Pritchett DO   traMADol (ULTRAM) 50 mg tablet Take 50 mg by mouth every 6 (six) hours as needed for moderate pain   Yes Historical Provider, MD   allopurinol (ZYLOPRIM) 300 mg tablet 1/2 tablet daily for the 1st 2 weeks then increase to a full tablet 10/24/18 10/27/18 Yes Marilyn Pritchett DO   colchicine (COLCRYS) 0 6 mg tablet Take 1 tablet (0 6 mg total) by mouth daily 10/24/18 10/27/18 Yes Marilyn Pritchett DO lisinopril-hydrochlorothiazide (PRINZIDE,ZESTORETIC) 20-12 5 MG per tablet Take 1 tablet by mouth daily 5/1/18 10/27/18  Ramakrishna Render, DO     I have reviewed home medications with patient personally  Allergies: No Known Allergies    Social History:     Marital Status: /Civil Union     Substance Use History:   History   Alcohol Use No     History   Smoking Status    Former Smoker   Smokeless Tobacco    Never Used     History   Drug Use No       Family History:    non-contributory    Physical Exam:     Vitals:   Blood Pressure: 146/63 (10/27/18 1154)  Pulse: 71 (10/27/18 1154)  Temperature: 98 1 °F (36 7 °C) (10/27/18 1154)  Temp Source: Temporal (10/27/18 1154)  Respirations: 18 (10/27/18 1154)  Height: 5' 10" (177 8 cm) (10/27/18 1154)  Weight - Scale: 67 2 kg (148 lb 2 4 oz) (10/27/18 1154)  SpO2: 99 % (10/27/18 1154)    Physical Exam  General:  NAD, awake, alert, oriented x 3  HEENT:  NC/AT, mucous membranes moist  Neck:  Supple, No JVP elevation  CV:  + S1, + S2, RRR  Pulm:  Lung fields are CTA bilaterally  Abd:  Soft, Non-tender, Non-distended  Ext:  No clubbing/cyanosis/edema  Skin:  Circular open wound of right lateral hip region with surrounding erythema      Additional Data:     Lab Results: I have personally reviewed pertinent reports          Results from last 7 days  Lab Units 10/27/18  0842   WBC Thousand/uL 7 13   HEMOGLOBIN g/dL 13 1   HEMATOCRIT % 42 6   PLATELETS Thousands/uL 279   NEUTROS ABS Thousands/µL 5 05   NEUTROS PCT % 70   LYMPHS PCT % 16   MONOS PCT % 10   EOS PCT % 3       Results from last 7 days  Lab Units 10/27/18  0842   SODIUM mmol/L 147*   POTASSIUM mmol/L 3 8   CHLORIDE mmol/L 110*   CO2 mmol/L 27   BUN mg/dL 21   CREATININE mg/dL 1 39*   ANION GAP mmol/L 10   CALCIUM mg/dL 8 8   ALBUMIN g/dL 3 0*   TOTAL BILIRUBIN mg/dL 0 60   ALK PHOS U/L 116   ALT U/L 9*   AST U/L 16                   Results from last 7 days  Lab Units 10/27/18  0842   LACTIC ACID mmol/L 2 5* Imaging: I have personally reviewed pertinent reports  XR hip/pelv 2-3 vws right    (Results Pending)   CT hip right wo contrast    (Results Pending)         Allscripts / Epic Records Reviewed: Yes     ** Please Note: This note has been constructed using a voice recognition system   **

## 2018-10-28 ENCOUNTER — APPOINTMENT (INPATIENT)
Dept: CT IMAGING | Facility: HOSPITAL | Age: 83
DRG: 463 | End: 2018-10-28
Payer: COMMERCIAL

## 2018-10-28 PROBLEM — E88.09 HYPOALBUMINEMIA: Status: ACTIVE | Noted: 2018-10-28

## 2018-10-28 PROBLEM — E16.2 HYPOGLYCEMIA: Status: ACTIVE | Noted: 2018-10-28

## 2018-10-28 LAB
ALBUMIN SERPL BCP-MCNC: 2.7 G/DL (ref 3.5–5)
ALP SERPL-CCNC: 103 U/L (ref 46–116)
ALT SERPL W P-5'-P-CCNC: 10 U/L (ref 12–78)
ANION GAP SERPL CALCULATED.3IONS-SCNC: 11 MMOL/L (ref 4–13)
AST SERPL W P-5'-P-CCNC: 19 U/L (ref 5–45)
BASOPHILS # BLD AUTO: 0.07 THOUSANDS/ΜL (ref 0–0.1)
BASOPHILS NFR BLD AUTO: 1 % (ref 0–1)
BILIRUB SERPL-MCNC: 0.6 MG/DL (ref 0.2–1)
BUN SERPL-MCNC: 19 MG/DL (ref 5–25)
CALCIUM SERPL-MCNC: 8.7 MG/DL (ref 8.3–10.1)
CHLORIDE SERPL-SCNC: 109 MMOL/L (ref 100–108)
CK SERPL-CCNC: 84 U/L (ref 39–308)
CO2 SERPL-SCNC: 24 MMOL/L (ref 21–32)
CREAT SERPL-MCNC: 1.28 MG/DL (ref 0.6–1.3)
EOSINOPHIL # BLD AUTO: 0.18 THOUSAND/ΜL (ref 0–0.61)
EOSINOPHIL NFR BLD AUTO: 3 % (ref 0–6)
ERYTHROCYTE [DISTWIDTH] IN BLOOD BY AUTOMATED COUNT: 13.8 % (ref 11.6–15.1)
GFR SERPL CREATININE-BSD FRML MDRD: 47 ML/MIN/1.73SQ M
GLUCOSE SERPL-MCNC: 61 MG/DL (ref 65–140)
GLUCOSE SERPL-MCNC: 87 MG/DL (ref 65–140)
HCT VFR BLD AUTO: 40.3 % (ref 36.5–49.3)
HGB BLD-MCNC: 12.5 G/DL (ref 12–17)
IMM GRANULOCYTES # BLD AUTO: 0.02 THOUSAND/UL (ref 0–0.2)
IMM GRANULOCYTES NFR BLD AUTO: 0 % (ref 0–2)
LYMPHOCYTES # BLD AUTO: 1.42 THOUSANDS/ΜL (ref 0.6–4.47)
LYMPHOCYTES NFR BLD AUTO: 21 % (ref 14–44)
MAGNESIUM SERPL-MCNC: 1.7 MG/DL (ref 1.6–2.6)
MCH RBC QN AUTO: 29.3 PG (ref 26.8–34.3)
MCHC RBC AUTO-ENTMCNC: 31 G/DL (ref 31.4–37.4)
MCV RBC AUTO: 95 FL (ref 82–98)
MONOCYTES # BLD AUTO: 0.64 THOUSAND/ΜL (ref 0.17–1.22)
MONOCYTES NFR BLD AUTO: 10 % (ref 4–12)
NEUTROPHILS # BLD AUTO: 4.34 THOUSANDS/ΜL (ref 1.85–7.62)
NEUTS SEG NFR BLD AUTO: 65 % (ref 43–75)
NRBC BLD AUTO-RTO: 0 /100 WBCS
PHOSPHATE SERPL-MCNC: 2.5 MG/DL (ref 2.3–4.1)
PLATELET # BLD AUTO: 271 THOUSANDS/UL (ref 149–390)
PMV BLD AUTO: 11.7 FL (ref 8.9–12.7)
POTASSIUM SERPL-SCNC: 4.2 MMOL/L (ref 3.5–5.3)
PROCALCITONIN SERPL-MCNC: <0.05 NG/ML
PROT SERPL-MCNC: 6 G/DL (ref 6.4–8.2)
RBC # BLD AUTO: 4.26 MILLION/UL (ref 3.88–5.62)
SODIUM SERPL-SCNC: 144 MMOL/L (ref 136–145)
TSH SERPL DL<=0.05 MIU/L-ACNC: 2.67 UIU/ML (ref 0.36–3.74)
WBC # BLD AUTO: 6.67 THOUSAND/UL (ref 4.31–10.16)

## 2018-10-28 PROCEDURE — 97167 OT EVAL HIGH COMPLEX 60 MIN: CPT

## 2018-10-28 PROCEDURE — 70450 CT HEAD/BRAIN W/O DYE: CPT

## 2018-10-28 PROCEDURE — 84443 ASSAY THYROID STIM HORMONE: CPT | Performed by: INTERNAL MEDICINE

## 2018-10-28 PROCEDURE — 85025 COMPLETE CBC W/AUTO DIFF WBC: CPT | Performed by: INTERNAL MEDICINE

## 2018-10-28 PROCEDURE — G8987 SELF CARE CURRENT STATUS: HCPCS

## 2018-10-28 PROCEDURE — 84145 PROCALCITONIN (PCT): CPT | Performed by: INTERNAL MEDICINE

## 2018-10-28 PROCEDURE — 97163 PT EVAL HIGH COMPLEX 45 MIN: CPT | Performed by: PHYSICAL THERAPIST

## 2018-10-28 PROCEDURE — 84100 ASSAY OF PHOSPHORUS: CPT | Performed by: INTERNAL MEDICINE

## 2018-10-28 PROCEDURE — 82550 ASSAY OF CK (CPK): CPT | Performed by: INTERNAL MEDICINE

## 2018-10-28 PROCEDURE — 80053 COMPREHEN METABOLIC PANEL: CPT | Performed by: INTERNAL MEDICINE

## 2018-10-28 PROCEDURE — G8979 MOBILITY GOAL STATUS: HCPCS | Performed by: PHYSICAL THERAPIST

## 2018-10-28 PROCEDURE — 97530 THERAPEUTIC ACTIVITIES: CPT

## 2018-10-28 PROCEDURE — 82948 REAGENT STRIP/BLOOD GLUCOSE: CPT

## 2018-10-28 PROCEDURE — 97116 GAIT TRAINING THERAPY: CPT | Performed by: PHYSICAL THERAPIST

## 2018-10-28 PROCEDURE — G8978 MOBILITY CURRENT STATUS: HCPCS | Performed by: PHYSICAL THERAPIST

## 2018-10-28 PROCEDURE — 83735 ASSAY OF MAGNESIUM: CPT | Performed by: INTERNAL MEDICINE

## 2018-10-28 PROCEDURE — 99232 SBSQ HOSP IP/OBS MODERATE 35: CPT | Performed by: INTERNAL MEDICINE

## 2018-10-28 PROCEDURE — G8988 SELF CARE GOAL STATUS: HCPCS

## 2018-10-28 RX ORDER — OLANZAPINE 5 MG/1
10 TABLET, ORALLY DISINTEGRATING ORAL 2 TIMES DAILY PRN
Status: DISCONTINUED | OUTPATIENT
Start: 2018-10-28 | End: 2018-11-05

## 2018-10-28 RX ORDER — OLANZAPINE 10 MG/1
5 INJECTION, POWDER, LYOPHILIZED, FOR SOLUTION INTRAMUSCULAR ONCE
Status: COMPLETED | OUTPATIENT
Start: 2018-10-28 | End: 2018-10-28

## 2018-10-28 RX ORDER — HALOPERIDOL 5 MG/ML
1 INJECTION INTRAMUSCULAR ONCE
Status: COMPLETED | OUTPATIENT
Start: 2018-10-28 | End: 2018-10-28

## 2018-10-28 RX ORDER — DEXTROSE MONOHYDRATE 25 G/50ML
25 INJECTION, SOLUTION INTRAVENOUS AS NEEDED
Status: DISCONTINUED | OUTPATIENT
Start: 2018-10-28 | End: 2018-11-05 | Stop reason: HOSPADM

## 2018-10-28 RX ADMIN — HEPARIN SODIUM 5000 UNITS: 5000 INJECTION, SOLUTION INTRAVENOUS; SUBCUTANEOUS at 05:07

## 2018-10-28 RX ADMIN — HALOPERIDOL LACTATE 1 MG: 5 INJECTION, SOLUTION INTRAMUSCULAR at 12:01

## 2018-10-28 RX ADMIN — SODIUM CHLORIDE 75 ML/HR: 0.45 INJECTION, SOLUTION INTRAVENOUS at 04:31

## 2018-10-28 RX ADMIN — LISINOPRIL 5 MG: 5 TABLET ORAL at 08:09

## 2018-10-28 RX ADMIN — HEPARIN SODIUM 5000 UNITS: 5000 INJECTION, SOLUTION INTRAVENOUS; SUBCUTANEOUS at 21:17

## 2018-10-28 RX ADMIN — OLANZAPINE 5 MG: 10 INJECTION, POWDER, FOR SOLUTION INTRAMUSCULAR at 15:32

## 2018-10-28 RX ADMIN — DOCUSATE SODIUM 100 MG: 100 CAPSULE, LIQUID FILLED ORAL at 08:09

## 2018-10-28 RX ADMIN — WATER 10 ML: 1 INJECTION INTRAMUSCULAR; INTRAVENOUS; SUBCUTANEOUS at 15:32

## 2018-10-28 RX ADMIN — CEFAZOLIN SODIUM 1000 MG: 1 SOLUTION INTRAVENOUS at 11:12

## 2018-10-28 RX ADMIN — METOPROLOL SUCCINATE 25 MG: 25 TABLET, EXTENDED RELEASE ORAL at 08:09

## 2018-10-28 RX ADMIN — MAGNESIUM OXIDE TAB 400 MG (241.3 MG ELEMENTAL MG) 400 MG: 400 (241.3 MG) TAB at 11:13

## 2018-10-28 RX ADMIN — ASPIRIN 81 MG 81 MG: 81 TABLET ORAL at 08:09

## 2018-10-28 NOTE — PLAN OF CARE
Problem: DISCHARGE PLANNING - CARE MANAGEMENT  Goal: Discharge to post-acute care or home with appropriate resources  INTERVENTIONS:  - Conduct assessment to determine patient/family and health care team treatment goals, and need for post-acute services based on payer coverage, community resources, and patient preferences, and barriers to discharge  - Address psychosocial, clinical, and financial barriers to discharge as identified in assessment in conjunction with the patient/family and health care team  - Arrange appropriate level of post-acute services according to patient's   needs and preference and payer coverage in collaboration with the physician and health care team  - Communicate with and update the patient/family, physician, and health care team regarding progress on the discharge plan  - Arrange appropriate transportation to post-acute venues  Outcome: Progressing  -?home with homehealth care vs possible STR on dc

## 2018-10-28 NOTE — PROGRESS NOTES
Patient continues to be agitated despite IV haldol, IM Zyprexa and he will not take oral zyprexa  His pupils on exam are about 1mm difference but are react equally  Will check a CT head w/o contrast   Order 1:1 observation

## 2018-10-28 NOTE — OCCUPATIONAL THERAPY NOTE
Occupational Therapy Evaluation     Patient Name: Bulmaro Conner  BTOWN'K Date: 10/28/2018  Problem List  Patient Active Problem List   Diagnosis    Bilateral leg edema    Cellulitis    Hx of cardiac murmur    ALEX (acute kidney injury) ([address] )    Elevated brain natriuretic peptide (BNP) level    Oral thrush    Hypernatremia    Lactic acidosis    Open wound of right hip    Gait instability    Essential hypertension    Hypercholesterolemia    CKD (chronic kidney disease)    Hypoglycemia    Hypoalbuminemia     Past Medical History  Past Medical History:   Diagnosis Date    Arthritis of knee     last assessed 11/7/14, resolved 10/2/17     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 ([address] )     last assessed 9/9/14, resolved 10/2/17     Avascular necrosis of hip ([address] )     last assessed 8/9/16, resolved 10/2/17     Bursitis of hip, right     last assessed 8/12/16, resolved 10/2/17     Cancer ([address] )     skin    Chronic kidney disease, stage 3 ([address] )     Closed intracapsular fracture of femur ([address] )     last assessed 8/9/16, resolved 10/2/17    Fracture of right hip ([address] )     Gout     last assessed 12/24/13, resolved 10/2/17     High cholesterol     Hypertension      Past Surgical History  Past Surgical History:   Procedure Laterality Date    A-V CARDIAC PACEMAKER INSERTION  10/10/2012    CARDIAC PACEMAKER PLACEMENT  2005    CATARACT EXTRACTION EXTRACAPSULAR W/ INTRAOCULAR LENS IMPLANTATION Bilateral     COLON SURGERY      COLOSTOMY  1982    CORONARY ANGIOPLASTY WITH STENT PLACEMENT      stent 1 Gradient, RLE Anteriorogram Poss Plasty Stent     HEMORRHOID SURGERY      HERNIA REPAIR  1986    bilat inguinal    HIP SURGERY Right 04/17/2014    Pin placed in hip due to fracture Dr Pollard    JOINT REPLACEMENT      R hip    POPLITEAL ARTERY ANGIOPLASTY      Femoral - Popliteal     REVISION COLOSTOMY  1983    SKIN CANCER EXCISION      WRIST FRACTURE SURGERY  11/02/2011 10/28/18 0956   Note Type   Note type Eval/Treat   Restrictions/Precautions   Weight Bearing Precautions Per Order No   Other Precautions Contact/isolation; Chair Alarm; Bed Alarm;Multiple lines;Telemetry; Fall Risk   Pain Assessment   Pain Assessment No/denies pain   Home Living   Type of [address] One level;Performs ADLs on one level; Able to live on main level with bedroom/bathroom  (no JOSIAH)   Bathroom Shower/Tub Tub/shower unit   Bathroom Toilet Standard   Bathroom Equipment Grab bars in shower; Shower chair   [address] Walker;Cane;Grab bars   Additional Comments pt utilizes RW at baseline    Prior Function   Level of [place] Needs assistance with ADLs and functional mobility; Needs assistance with IADLs   Lives With Spouse   Receives Help From Family   ADL Assistance Needs assistance   IADLs Needs assistance   Falls in the last 6 months 0   Vocational Retired   Comments pt's son completes driving for pt and his wife   Psychosocial   Psychosocial (WDL) X   Patient Behaviors/Mood Anxious;Irritable   Subjective   Subjective "I am miserable"   ADL   Where Assessed Edge of bed   LB Dressing Assistance 1  Total Assistance   LB Dressing Deficit Don/doff R sock; Don/doff L sock   Bed Mobility   Additional Comments pt seated EOB at start of session and in chair at end of session   Transfers   Sit to Stand 4  Minimal assistance   Additional items Assist x 1; Increased time required;Verbal cues; Impulsive  (RW)   Stand to Sit 4  Minimal assistance   Additional items Assist x 1; Increased time required;Verbal cues  (RW)   Functional Mobility   Functional Mobility 4  Minimal assistance   Additional Comments x1-2; pt requires direction with RW during functional mobility; performed ~10ft of functional mobility with use of RW; pt confused throughout session and requires increased verbal and physical cues to maintain focus   Additional items Rolling walker   Balance Static Sitting Good   Dynamic Sitting Fair +   Static Standing Fair   Dynamic Standing Fair -   Ambulatory Fair -   Activity Tolerance   Activity Tolerance Patient limited by fatigue   RUE Assessment   RUE Assessment WFL   LUE Assessment   LUE Assessment WFL   Hand Function   Gross Motor Coordination Functional   Fine Motor Coordination Functional   Sensation   Light Touch No apparent deficits   Sharp/Dull No apparent deficits   Cognition   Overall Cognitive Status Impaired   Arousal/Participation Alert   Attention Attends with cues to redirect   Orientation Level Oriented to person;Disoriented to place; Disoriented to time;Disoriented to situation   Memory Decreased long term memory;Decreased short term memory;Decreased recall of recent events   Following Commands Follows one step commands with increased time or repetition   Assessment   Limitation Decreased ADL status; Decreased UE strength;Decreased Safe judgement during ADL;Decreased cognition;Decreased endurance;Decreased self-care trans;Decreased high-level ADLs   Assessment Pt is a 80 y o  male seen for OT evaluation s/p admit to [facility] on 10/27/2018 w/ Open wound of right hip  Comorbidities affecting pt's functional performance at time of assessment include: limited hearing and dementia  Personal factors affecting pt at time of IE include:limited home support, behavioral pattern, difficulty performing ADLS, difficulty performing IADLS , limited insight into deficits, compliance and decreased initiation and engagement   Prior to admission, pt was (A) with ADLs/IADLs with use of RW during functional mobility  Upon evaluation: Pt requires min (A) with use of RW during functional mobility and increased cues 2* the following deficits impacting occupational performance: weakness, decreased strength, decreased balance, decreased tolerance, impaired initiation, impaired sequencing, impaired problem solving, impulsivity, decreased safety awareness and increased pain  Pt to benefit from continued skilled OT tx while in the hospital to address deficits as defined above and maximize level of functional independence w ADL's and functional mobility  Occupational Performance areas to address include: grooming, bathing/shower, toilet hygiene, dressing, functional mobility, community mobility and clothing management  From OT standpoint, recommendation at time of d/c would be short term rehab  Goals   Patient Goals to see his wife   Short Term Goal  pt will perform UE strengthening and ROM exercises while seated in chair or EOB in order to maximize (I) with ADL performance    Long Term Goal #1 pt will demonstrate UB bathing and grooming tasks while seated EOB or in chair with decreased cues    Long Term Goal #2 pt will increase independence with functional mobility with use of RW to (S) level with decreased cues and increased distance    Long Term Goal pt will demonstrate toilet transfers and toilet hygiene at (S) level    Plan   Goal Expiration Date 11/11/18   OT Frequency 3-5x/wk   Recommendation   OT Discharge Recommendation Short Term Rehab   Barthel Index   Feeding 5   Bathing 0   Grooming Score 0   Dressing Score 5   Bladder Score 5   Bowels Score 5   Toilet Use Score 5   Transfers (Bed/Chair) Score 10   Mobility (Level Surface) Score 10   Stairs Score 0   Barthel Index Score 45       Pt will benefit from continued OT services in order to maximize (I) c ADL performance, FM c RW, and improve overall endurance/strength required to complete functional tasks in preparation for d/c  Pt left seated in chair at end of session; all needs within reach; all lines intact

## 2018-10-28 NOTE — PLAN OF CARE
Problem: PHYSICAL THERAPY ADULT  Goal: Performs mobility at highest level of function for planned discharge setting  See evaluation for individualized goals  Treatment/Interventions: Functional transfer training, LE strengthening/ROM, Therapeutic exercise, Gait training, Bed mobility  Equipment Recommended: Walker       See flowsheet documentation for full assessment, interventions and recommendations  Prognosis: Fair  Problem List: Decreased strength, Decreased range of motion, Decreased endurance, Impaired balance, Decreased mobility  Assessment: Pt is a 80year old male admitted with a dx of open wound of right hip  Pt is confused throughout the entire treatment session  He requires min (A) for transfers and ambulaton  Several buckling episodes on RLE observed during gait training  He would benefit from PT to help improve strength, ROM, balance, safety, and functional activity tolerance  Recommendation: Short-term skilled PT     PT - OK to Discharge: No    See flowsheet documentation for full assessment

## 2018-10-28 NOTE — ASSESSMENT & PLAN NOTE
· With surrounding cellulitis  · History of right hip surgery in 2014 by Dr Amie Pacheco (Orthopedic Surgery)  · Continue IV cefazolin  · Check a MRSA nasal screen  · Pain control  · PT/OT  · Consult the wound care team  · The patient cannot have an MRI secondary to having an indwelling permanent pacemaker  · A CT scan of the right hip was completed on 10/27/2018 with the following results/impression:  IMPRESSION:     1  Soft tissue swelling overlying the right greater trochanter  Reactive or infectious bursitis is present, though associated bursal calcifications suggest chronic nature  No soft tissue gas      2  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

## 2018-10-28 NOTE — NURSING NOTE
Patient ripped out his IV  Pateint is combative at attempts to re-insert peripheral IV access  Dr Carlyn Starks made aware  Will continue to monitor patient

## 2018-10-28 NOTE — ASSESSMENT & PLAN NOTE
· Resolved with 1/2 NSS IV fluids  · Continue 1/2 NSS IV fluids at 50 ml/hr  · Follow the sodium level

## 2018-10-28 NOTE — SOCIAL WORK
Cm attempted to meet with the patient to evaluate the patients prior function and living situation and any barriers to d/c and form a safe d/c plan  And to also evaluate the patient for any services in the home or needs for services  Pt is very confused, yelling out  Reviewed information from last hospital stay  Pt resides at home in a ranch house with his wife  Pt had for the most part been independent with his adls and ambulation  Pt uses a walker for ambulation  Pt is active with [facility]  Pt's son does the driving  PCP is Barbara Werner and [facility]  Pt is followed by OP care coordinator:Rod Kay  At this time probable need for STR on dc (Cm will see how pt does with therapy)  Will continue to follow  Pt is a 30 day readmission and was here from 10/5/18-10/8/18 with bilateral leg edema, oral thrush, ALEX; cellulitis    Now admitted with open wound of right hip with surrounding cellulitis:  Pt with Hx of right hip surgery in 2014 by Dr Mario Chen (Orthopedic Surgery)   -patient developed a chronic, right lateral hip wound over the last several months  Over the last few days, the open wound of the right hip developed surrounding erythema as well as a purulent drainage, was experiencing severe right hip pain at the site of the open wound

## 2018-10-28 NOTE — UTILIZATION REVIEW
Initial Clinical Review    Admission: Date/Time/Statement: 10/27/18 @ 1019     Orders Placed This Encounter   Procedures    Inpatient Admission     Standing Status:   Standing     Number of Occurrences:   1     Order Specific Question:   Admitting Physician     Answer:   Rosenda Caldwell     Order Specific Question:   Level of Care     Answer:   Med Surg [16]     Order Specific Question:   Estimated length of stay     Answer:   More than 2 Midnights     Order Specific Question:   Certification     Answer:   I certify that inpatient services are medically necessary for this patient for a duration of greater than two midnights  See H&P and MD Progress Notes for additional information about the patient's course of treatment  ED: Date/Time/Mode of Arrival:   ED Arrival Information     Expected Arrival Acuity Means of Arrival Escorted By Service Admission Type    - 10/27/2018 08:14 Urgent Ambulance [place] [facility] Urgent    Arrival Complaint    Hip Pain        Chief Complaint:   Chief Complaint   Patient presents with    Wound Check     Patient has pressure ulcer on right hip, complains of pain at wound site     History of Illness: The patient is a 80year-old male who presented to the emergency department with the complaint of an open wound his right hip as well as right hip pain  The patient had right hip surgery by Dr Jayant Tompkins (Orthopedic Surgery) for a right hip fracture and avascular necrosis of the right hip  The patient developed a chronic, right lateral hip wound over the last several months  Over the last few days, the open wound of the right hip developed surrounding erythema as well as a purulent drainage  When the patient woke up today, 10/27/2018, the patient was experiencing severe right hip pain at the site of the open wound  The patient is currently unable to ambulate secondary to the severe right hip pain  No fevers or chills  No chest pain    No shortness of breath  No abdominal pain  No nausea or vomiting  ED Vital Signs:   ED Triage Vitals [10/27/18 0819]   Temperature Pulse Respirations Blood Pressure SpO2   (!) 97 4 °F (36 3 °C) 84 18 140/75 96 %      Temp Source Heart Rate Source Patient Position - Orthostatic VS BP Location FiO2 (%)   Temporal Monitor Lying Left arm --      Pain Score       Worst Possible Pain        Wt Readings from Last 1 Encounters:   10/27/18 67 2 kg (148 lb 2 4 oz)     Vital Signs (abnormal):   10/27/18 0819   97 4 °F (36 3 °C)  84  18  140/75  --  96 %  None (Room air)  Lying     Abnormal Labs:   10/27 10/28   Sodium 147    144   Chloride 110 109   Creatinine 1 39 1 28   Glucose 99 61   ALT 9 10   Total Protein 6 6 6 0   Albumin 3 0 2 7     Lactic acid 2 5    Diagnostic Test Results:     10/27 Xray R hip - 1  Unchanged appearance of the right femoral head status post surgical fixation  Please see follow-up right hip CT report  10/27 CT R hip - 1  Soft tissue swelling overlying the right greater trochanter  Reactive or infectious bursitis is present, though associated bursal calcifications suggest chronic nature  No soft tissue gas  2   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  Surgical lag screws now project directly into the joint space and have begun to erode into the acetabulum  3   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      ED Treatment:   Medication Administration from 10/27/2018 0814 to 10/27/2018 1053    Date/Time Order Dose Route Action   10/27/2018 1006 fentanyl citrate (PF) 100 MCG/2ML 25 mcg 25 mcg Intravenous Given        Past Medical/Surgical History:    Active Ambulatory Problems     Diagnosis Date Noted    Bilateral leg edema 10/05/2018    Cellulitis 10/05/2018    Hx of cardiac murmur 10/05/2018    ALEX ([facility]) [facility] ) 10/05/2018    Elevated brain natriuretic peptide (BNP) level 10/05/2018    Oral thrush 10/08/2018     Resolved Ambulatory Problems     Diagnosis Date Noted    No Resolved Ambulatory Problems     Past Medical History:   Diagnosis Date    Arthritis of knee     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)     Avascular necrosis of hip (HCC)     Bursitis of hip, right     Cancer [facility] )     Chronic kidney disease, stage 3 (HCC)     Closed intracapsular fracture of femur ([facility] )     Fracture of right hip ([facility] )     Gout     High cholesterol     Hypertension      Admitting Diagnosis: Hip pain [M25 [id number]]  Open wound of right hip [S71 001A]    Age/Sex: 80 y o  male    Assessment/Plan:        * Open wound of right hip   Assessment & Plan     · Admit to med/surg level of care  · With surrounding cellulitis  · History of right hip surgery in 2014 by Dr Brian Leary (Orthopedic Surgery)  · IV cefazolin  · Check a MRSA nasal screen  · Pain control  · PT/OT  · Check a CT scan of the right hip without contrast  · Consult the wound care team      Gait instability   Assessment & Plan     · The patient is experiencing right hip pain and ambulatory dysfunction secondary to the open wound of the right hip    · Consult PT/OT  · He may require short-term rehabilitation placement upon discharge       Hypercholesterolemia   Assessment & Plan     · Continue statin therapy      Essential hypertension   Assessment & Plan     · Continue toprol XL and lisinopril  · Follow the blood pressure trend  · Outpatient follow-up with his PCP in regards to this matter      Lactic acidosis   Assessment & Plan     · Normal saline IV fluid bolus of 500 mL over 2 hours  · Recheck the lactic acid level after the normal saline IV fluid bolus  · Check and follow the procalcitonin      Hypernatremia   Assessment & Plan     · After the normal saline IV fluid bolus of 500 mL is completed, he will be on 1/2 normal saline IV fluids at 75 ml/hr  · Follow the sodium level      VTE Prophylaxis: Heparin  / sequential compression device   Code Status:  Level 1-Full Code  Anticipated Length of Stay:  Patient will be admitted on an Inpatient basis with an anticipated length of stay of greater than 2 midnights  Justification for Hospital Stay:  The patient requires IV antibiotics, continuous IV fluids, a CT scan of the right hip without contrast, and PT/OT evaluations  Chief Complaint:  Open wound of right hip and right hip pain     Admission Orders:  Scheduled Meds:   Current Facility-Administered Medications:  acetaminophen 650 mg Oral Q6H PRN    aspirin 81 mg Oral Daily    cefazolin 1,000 mg Intravenous Q12H Last Rate: 1,000 mg (10/28/18 1112)   dextrose 25 mL Intravenous PRN    docusate sodium 100 mg Oral BID    heparin (porcine) 5,000 Units Subcutaneous Q8H [address]    HYDROmorphone 0 2 mg Intravenous Q3H PRN    magnesium oxide 400 mg Oral BID    metoprolol succinate 25 mg Oral Daily    ondansetron 4 mg Intravenous Q4H PRN    oxyCODONE 5 mg Oral Q4H PRN    pravastatin 40 mg Oral HS    sodium chloride 50 mL/hr Intravenous Continuous Last Rate: 50 mL/hr (10/28/18 1113)     Continuous Infusions:   sodium chloride 50 mL/hr Last Rate: 50 mL/hr (10/28/18 1113)     PRN Meds:   acetaminophen    dextrose    HYDROmorphone x1    ondansetron    oxyCODONE    SCDs  POC glucose ac/hs   Up w  Assist   Diet regular  10/29 diet NPO p MN   PT eval/tx     Thank you,  [facility]  Utilization Review Department  Phone: [phone]; Fax [phone]  ATTENTION: Please call with any questions or concerns to [phone]  and carefully follow the prompts so that you are directed to the right person  Send all requests for admission clinical reviews, approved or denied determinations and any other requests to fax [phone]   All voicemails are confidential

## 2018-10-28 NOTE — PLAN OF CARE
Problem: OCCUPATIONAL THERAPY ADULT  Goal: Performs self-care activities at highest level of function for planned discharge setting  See evaluation for individualized goals  Treatment Interventions: ADL retraining, Functional transfer training, UE strengthening/ROM, Endurance training, Patient/family training, Activityengagement          See flowsheet documentation for full assessment, interventions and recommendations  Limitation: Decreased ADL status, Decreased UE strength, Decreased Safe judgement during ADL, Decreased cognition, Decreased endurance, Decreased self-care trans, Decreased high-level ADLs     Assessment: Pt is a 80 y o  male seen for OT evaluation s/p admit to [facility] on 10/27/2018 w/ Open wound of right hip  Comorbidities affecting pt's functional performance at time of assessment include: limited hearing and dementia  Personal factors affecting pt at time of IE include:limited home support, behavioral pattern, difficulty performing ADLS, difficulty performing IADLS , limited insight into deficits, compliance and decreased initiation and engagement   Prior to admission, pt was (A) with ADLs/IADLs with use of RW during functional mobility  Upon evaluation: Pt requires min (A) with use of RW during functional mobility and increased cues 2* the following deficits impacting occupational performance: weakness, decreased strength, decreased balance, decreased tolerance, impaired initiation, impaired sequencing, impaired problem solving, impulsivity, decreased safety awareness and increased pain  Pt to benefit from continued skilled OT tx while in the hospital to address deficits as defined above and maximize level of functional independence w ADL's and functional mobility  Occupational Performance areas to address include: grooming, bathing/shower, toilet hygiene, dressing, functional mobility, community mobility and clothing management   From OT standpoint, recommendation at time of d/c would be short term rehab         OT Discharge Recommendation: Short Term Rehab

## 2018-10-28 NOTE — ASSESSMENT & PLAN NOTE
· Baseline creatinine 1 0-1 5  · Avoid all nephrotoxic agents  · Serial laboratory testing to monitor his renal function and electrolytes

## 2018-10-28 NOTE — PHYSICAL THERAPY NOTE
PT Evaluation        10/28/18 1026   Note Type   Note type Eval/Treat   Home Living   Type of [address] One level   Additional Comments Wife assists with ADLs and IADL's   Restrictions/Precautions   Weight Bearing Precautions Per Order No   Other Precautions Contact/isolation; Bed Alarm; Chair Alarm; Fall Risk;Pain;Multiple lines   Cognition   Overall Cognitive Status Impaired   Arousal/Participation Alert   Orientation Level Oriented to person;Disoriented to place; Disoriented to time;Disoriented to situation   Memory Decreased recall of recent events;Decreased short term memory   Following Commands Follows one step commands inconsistently   RLE Assessment   RLE Assessment WFL  (R Hip Fx )   LLE Assessment   LLE Assessment WFL   Transfers   Sit to Stand 4  Minimal assistance   Additional items Verbal cues; Increased time required;Assist x 2   Stand to Sit 4  Minimal assistance   Additional items Verbal cues; Increased time required;Assist x 2   Additional Comments Pt seated EOB When PT entered, Seated in chair at the end of the session    Ambulation/Elevation   Gait pattern Antalgic; Forward Flexion;Decreased R stance; Improper Weight shift;R Knee Lary   Gait Assistance 4  Minimal assist   Additional items Assist x 2   Assistive Device Rolling walker   Distance 30 feet in room    Balance   Static Sitting Fair +   Dynamic Sitting Fair +   Static Standing Fair   Dynamic Standing Fair   Ambulatory Fair   Activity Tolerance   Activity Tolerance Treatment limited secondary to medical complications (Comment); Patient limited by pain   Assessment   Prognosis Fair   Problem List Decreased strength;Decreased range of motion;Decreased endurance; Impaired balance;Decreased mobility   Assessment Pt is a 80year old male admitted with a dx of open wound of right hip  Pt is confused throughout the entire treatment session  He requires min (A) for transfers and ambulaton   Several buckling episodes on RLE observed during gait training  He would benefit from PT to help improve strength, ROM, balance, safety, and functional activity tolerance  Goals   LTG Expiration Date 11/11/18   Long Term Goal #1 Pt will be (I) with all transfers and bed mobility    Long Term Goal #2 Pt will ambulate 100 feet (I) using RW    Plan   Treatment/Interventions Functional transfer training;LE strengthening/ROM; Therapeutic exercise;Gait training;Bed mobility   PT Frequency One time visit; Once a day   Recommendation   Recommendation Short-term skilled PT   Equipment Recommended Walker   PT - OK to Discharge No   PT seated at EOB When PT entered  Pt seated in chair when PT left  All lines intact, all needs within reach

## 2018-10-28 NOTE — PROGRESS NOTES
Progress Note - Link Yue 5/13/1921, 80 y o  male MRN: [id number]    Unit/Bed#: 427-01 Encounter: [id number]    Primary Care Provider: Renny Kussmaul, DO   Date and time admitted to hospital: 10/27/2018  8:17 AM        * Open wound of right hip   Assessment & Plan    · With surrounding cellulitis  · History of right hip surgery in 2014 by Dr Kathleen Beck (Orthopedic Surgery)  · Continue IV cefazolin  · Check a MRSA nasal screen  · Pain control  · PT/OT  · Consult the wound care team  · The patient cannot have an MRI secondary to having an indwelling permanent pacemaker  · A CT scan of the right hip was completed on 10/27/2018 with the following results/impression:  IMPRESSION:     1  Soft tissue swelling overlying the right greater trochanter  Reactive or infectious bursitis is present, though associated bursal calcifications suggest chronic nature  No soft tissue gas      2  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  Hypoglycemia   Assessment & Plan    · Initiate the hypoglycemia protocol  · Initiate blood glucose monitoring ACHS  · D50 25 ml IV PRN blood glucose less than 70 mg/dl     Gait instability   Assessment & Plan    · The patient is experiencing right hip pain and ambulatory dysfunction secondary to the open wound of the right hip  · Consult PT/OT  · He may require short-term rehabilitation placement upon discharge       Hypoalbuminemia   Assessment & Plan    · Nutrition/dietitian consultation     CKD (chronic kidney disease)   Assessment & Plan    · Baseline creatinine 1 0-1 5  · Avoid all nephrotoxic agents  · Serial laboratory testing to monitor his renal function and electrolytes Hypercholesterolemia   Assessment & Plan    · Continue statin therapy     Essential hypertension   Assessment & Plan    · Continue toprol XL  · Per the patient's son, the patient does not take lisinopril at this time  · Follow the blood pressure trend  · Outpatient follow-up with his PCP in regards to this matter     Lactic acidosis   Assessment & Plan    · Resolved with IV fluids  · Follow the procalcitonin     Hypernatremia   Assessment & Plan    · Resolved with 1/2 NSS IV fluids  · Continue 1/2 NSS IV fluids at 50 ml/hr  · Follow the sodium level           VTE Pharmacologic Prophylaxis:   Pharmacologic: Heparin  Mechanical VTE Prophylaxis in Place: Yes    Patient Centered Rounds: I have performed bedside rounds with nursing staff today  Time Spent for Care: 20 minutes  More than 50% of total time spent on counseling and coordination of care as described above  Current Length of Stay: 1 day(s)    Current Patient Status: Inpatient   Certification Statement: The patient will continue to require additional inpatient hospital stay due to the need for IV antibiotics, the need for PT/OT evaluations, and the possible need for short-term rehabilitation placement upon discharge  Code Status: Level 1 - Full Code      Subjective: The patient was seen and examined  The patient is doing better  He still complains of mild pain involving the open wound of his right hip  Objective:     Vitals:   Temp (24hrs), Av 5 °F (36 9 °C), Min:98 1 °F (36 7 °C), Max:98 8 °F (37 1 °C)    Temp:  [98 1 °F (36 7 °C)-98 8 °F (37 1 °C)] 98 8 °F (37 1 °C)  HR:  [69-74] 74  Resp:  [17-18] 18  BP: (138-165)/(63-70) 138/63  SpO2:  [94 %-99 %] 94 %  Body mass index is 21 26 kg/m²  Input and Output Summary (last 24 hours):        Intake/Output Summary (Last 24 hours) at 10/28/18 0951  Last data filed at 10/28/18 0900   Gross per 24 hour   Intake             1220 ml   Output                0 ml   Net             1220 ml Physical Exam:     Physical Exam  General:  NAD, awake, alert, follows commands  HEENT:  NC/AT, mucous membranes moist  Neck:  Supple, No JVP elevation  CV:  + S1, + S2, RRR  Pulm:  Lung fields are CTA bilaterally  Abd:  Soft, Non-tender, Non-distended  Ext:  No clubbing/cyanosis/edema  Skin:  + circular open wound of the lateral aspect of the right hip with surrounding erythema      Additional Data:     Labs:      Results from last 7 days  Lab Units 10/28/18  0444   WBC Thousand/uL 6 67   HEMOGLOBIN g/dL 12 5   HEMATOCRIT % 40 3   PLATELETS Thousands/uL 271   NEUTROS PCT % 65   LYMPHS PCT % 21   MONOS PCT % 10   EOS PCT % 3       Results from last 7 days  Lab Units 10/28/18  0444   SODIUM mmol/L 144   POTASSIUM mmol/L 4 2   CHLORIDE mmol/L 109*   CO2 mmol/L 24   BUN mg/dL 19   CREATININE mg/dL 1 28   ANION GAP mmol/L 11   CALCIUM mg/dL 8 7   ALBUMIN g/dL 2 7*   TOTAL BILIRUBIN mg/dL 0 60   ALK PHOS U/L 103   ALT U/L 10*   AST U/L 19                   Results from last 7 days  Lab Units 10/27/18  1436 10/27/18  0842   LACTIC ACID mmol/L 0 8 2 5*   PROCALCITONIN ng/ml <0 05  --            * I Have Reviewed All Lab Data Listed Above  * Additional Pertinent Lab Tests Reviewed:  All [facility] Admission Reviewed      Recent Cultures (last 7 days):           Last 24 Hours Medication List:     Current Facility-Administered Medications:  acetaminophen 650 mg Oral Q6H PRN Rosemarie Martyr, DO    aspirin 81 mg Oral Daily Rosemarie Martyr, DO    cefazolin 1,000 mg Intravenous Q12H Rosemarie Martyr, DO Last Rate: 1,000 mg (10/27/18 2336)   dextrose 25 mL Intravenous PRN Rosemarie Martyr, DO    docusate sodium 100 mg Oral BID Rosemarie Martyr, DO    heparin (porcine) 5,000 Units Subcutaneous Q8H [facility] Esequiel Harding, DO    HYDROmorphone 0 2 mg Intravenous Q3H PRN Rosemarie Martyr, DO    magnesium oxide 400 mg Oral BID Rosemarie Martyr, DO    metoprolol succinate 25 mg Oral Daily Rosemarie Martyr, DO    ondansetron 4 mg Intravenous Q4H PRN [employer], DO    oxyCODONE 5 mg Oral Q4H PRN [employer], DO    pravastatin 40 mg Oral HS Esequiel Harding, DO    sodium chloride 50 mL/hr Intravenous Continuous [employer], DO Last Rate: 75 mL/hr (10/28/18 0431)        Today, Patient Was Seen By: Andre Leal, DO    ** Please Note: Dictation voice to text software may have been used in the creation of this document   **

## 2018-10-28 NOTE — ASSESSMENT & PLAN NOTE
· Continue toprol XL  · Per the patient's son, the patient does not take lisinopril at this time  · Follow the blood pressure trend  · Outpatient follow-up with his PCP in regards to this matter

## 2018-10-28 NOTE — ASSESSMENT & PLAN NOTE
· Initiate the hypoglycemia protocol  · Initiate blood glucose monitoring ACHS  · D50 25 ml IV PRN blood glucose less than 70

## 2018-10-29 PROBLEM — R93.0 ABNORMAL CT SCAN OF HEAD: Status: ACTIVE | Noted: 2018-10-29

## 2018-10-29 PROBLEM — G93.41 ACUTE METABOLIC ENCEPHALOPATHY: Status: ACTIVE | Noted: 2018-10-29

## 2018-10-29 PROBLEM — R74.01 TRANSAMINITIS: Status: ACTIVE | Noted: 2018-10-29

## 2018-10-29 LAB
ALBUMIN SERPL BCP-MCNC: 2.7 G/DL (ref 3.5–5)
ALP SERPL-CCNC: 103 U/L (ref 46–116)
ALT SERPL W P-5'-P-CCNC: 10 U/L (ref 12–78)
ANION GAP SERPL CALCULATED.3IONS-SCNC: 14 MMOL/L (ref 4–13)
AST SERPL W P-5'-P-CCNC: 56 U/L (ref 5–45)
BACTERIA UR QL AUTO: ABNORMAL /HPF
BASOPHILS # BLD AUTO: 0.05 THOUSANDS/ΜL (ref 0–0.1)
BASOPHILS NFR BLD AUTO: 1 % (ref 0–1)
BILIRUB SERPL-MCNC: 0.8 MG/DL (ref 0.2–1)
BILIRUB UR QL STRIP: NEGATIVE
BUN SERPL-MCNC: 23 MG/DL (ref 5–25)
CALCIUM SERPL-MCNC: 8.9 MG/DL (ref 8.3–10.1)
CHLORIDE SERPL-SCNC: 109 MMOL/L (ref 100–108)
CLARITY UR: CLEAR
CO2 SERPL-SCNC: 17 MMOL/L (ref 21–32)
COLOR UR: YELLOW
CREAT SERPL-MCNC: 1.31 MG/DL (ref 0.6–1.3)
EOSINOPHIL # BLD AUTO: 0.11 THOUSAND/ΜL (ref 0–0.61)
EOSINOPHIL NFR BLD AUTO: 2 % (ref 0–6)
ERYTHROCYTE [DISTWIDTH] IN BLOOD BY AUTOMATED COUNT: 13.7 % (ref 11.6–15.1)
GFR SERPL CREATININE-BSD FRML MDRD: 45 ML/MIN/1.73SQ M
GLUCOSE SERPL-MCNC: 102 MG/DL (ref 65–140)
GLUCOSE SERPL-MCNC: 112 MG/DL (ref 65–140)
GLUCOSE SERPL-MCNC: 82 MG/DL (ref 65–140)
GLUCOSE SERPL-MCNC: 84 MG/DL (ref 65–140)
GLUCOSE SERPL-MCNC: 97 MG/DL (ref 65–140)
GLUCOSE UR STRIP-MCNC: NEGATIVE MG/DL
HCT VFR BLD AUTO: 40.5 % (ref 36.5–49.3)
HGB BLD-MCNC: 12.8 G/DL (ref 12–17)
HGB UR QL STRIP.AUTO: NEGATIVE
IMM GRANULOCYTES # BLD AUTO: 0.03 THOUSAND/UL (ref 0–0.2)
IMM GRANULOCYTES NFR BLD AUTO: 0 % (ref 0–2)
KETONES UR STRIP-MCNC: ABNORMAL MG/DL
LEUKOCYTE ESTERASE UR QL STRIP: NEGATIVE
LYMPHOCYTES # BLD AUTO: 0.88 THOUSANDS/ΜL (ref 0.6–4.47)
LYMPHOCYTES NFR BLD AUTO: 12 % (ref 14–44)
MAGNESIUM SERPL-MCNC: 1.9 MG/DL (ref 1.6–2.6)
MCH RBC QN AUTO: 29.6 PG (ref 26.8–34.3)
MCHC RBC AUTO-ENTMCNC: 31.6 G/DL (ref 31.4–37.4)
MCV RBC AUTO: 94 FL (ref 82–98)
MONOCYTES # BLD AUTO: 0.83 THOUSAND/ΜL (ref 0.17–1.22)
MONOCYTES NFR BLD AUTO: 11 % (ref 4–12)
MRSA NOSE QL CULT: NORMAL
NEUTROPHILS # BLD AUTO: 5.58 THOUSANDS/ΜL (ref 1.85–7.62)
NEUTS SEG NFR BLD AUTO: 74 % (ref 43–75)
NITRITE UR QL STRIP: NEGATIVE
NON-SQ EPI CELLS URNS QL MICRO: ABNORMAL /HPF
NRBC BLD AUTO-RTO: 0 /100 WBCS
PH UR STRIP.AUTO: 5.5 [PH] (ref 4.5–8)
PHOSPHATE SERPL-MCNC: 3.1 MG/DL (ref 2.3–4.1)
PLATELET # BLD AUTO: 269 THOUSANDS/UL (ref 149–390)
PMV BLD AUTO: 11.8 FL (ref 8.9–12.7)
POTASSIUM SERPL-SCNC: 4.8 MMOL/L (ref 3.5–5.3)
PROCALCITONIN SERPL-MCNC: <0.05 NG/ML
PROT SERPL-MCNC: 6.3 G/DL (ref 6.4–8.2)
PROT UR STRIP-MCNC: NEGATIVE MG/DL
RBC # BLD AUTO: 4.33 MILLION/UL (ref 3.88–5.62)
RBC #/AREA URNS AUTO: ABNORMAL /HPF
SODIUM SERPL-SCNC: 140 MMOL/L (ref 136–145)
SP GR UR STRIP.AUTO: >=1.03 (ref 1–1.03)
UROBILINOGEN UR QL STRIP.AUTO: 0.2 E.U./DL
WBC # BLD AUTO: 7.48 THOUSAND/UL (ref 4.31–10.16)
WBC #/AREA URNS AUTO: ABNORMAL /HPF

## 2018-10-29 PROCEDURE — 80053 COMPREHEN METABOLIC PANEL: CPT | Performed by: INTERNAL MEDICINE

## 2018-10-29 PROCEDURE — 99232 SBSQ HOSP IP/OBS MODERATE 35: CPT | Performed by: ORTHOPAEDIC SURGERY

## 2018-10-29 PROCEDURE — 97116 GAIT TRAINING THERAPY: CPT

## 2018-10-29 PROCEDURE — G8996 SWALLOW CURRENT STATUS: HCPCS

## 2018-10-29 PROCEDURE — 99253 IP/OBS CNSLTJ NEW/EST LOW 45: CPT | Performed by: SURGERY

## 2018-10-29 PROCEDURE — 82948 REAGENT STRIP/BLOOD GLUCOSE: CPT

## 2018-10-29 PROCEDURE — G8997 SWALLOW GOAL STATUS: HCPCS

## 2018-10-29 PROCEDURE — 81001 URINALYSIS AUTO W/SCOPE: CPT | Performed by: INTERNAL MEDICINE

## 2018-10-29 PROCEDURE — 97530 THERAPEUTIC ACTIVITIES: CPT

## 2018-10-29 PROCEDURE — 87086 URINE CULTURE/COLONY COUNT: CPT | Performed by: INTERNAL MEDICINE

## 2018-10-29 PROCEDURE — 84100 ASSAY OF PHOSPHORUS: CPT | Performed by: INTERNAL MEDICINE

## 2018-10-29 PROCEDURE — 84145 PROCALCITONIN (PCT): CPT | Performed by: INTERNAL MEDICINE

## 2018-10-29 PROCEDURE — 83735 ASSAY OF MAGNESIUM: CPT | Performed by: INTERNAL MEDICINE

## 2018-10-29 PROCEDURE — 92610 EVALUATE SWALLOWING FUNCTION: CPT

## 2018-10-29 PROCEDURE — 99232 SBSQ HOSP IP/OBS MODERATE 35: CPT | Performed by: INTERNAL MEDICINE

## 2018-10-29 PROCEDURE — 85025 COMPLETE CBC W/AUTO DIFF WBC: CPT | Performed by: INTERNAL MEDICINE

## 2018-10-29 RX ORDER — DEXTROSE AND SODIUM CHLORIDE 5; .45 G/100ML; G/100ML
75 INJECTION, SOLUTION INTRAVENOUS CONTINUOUS
Status: DISCONTINUED | OUTPATIENT
Start: 2018-10-29 | End: 2018-10-30

## 2018-10-29 RX ADMIN — DOCUSATE SODIUM 100 MG: 100 CAPSULE, LIQUID FILLED ORAL at 17:39

## 2018-10-29 RX ADMIN — HEPARIN SODIUM 5000 UNITS: 5000 INJECTION, SOLUTION INTRAVENOUS; SUBCUTANEOUS at 21:01

## 2018-10-29 RX ADMIN — DEXTROSE AND SODIUM CHLORIDE 75 ML/HR: 5; .45 INJECTION, SOLUTION INTRAVENOUS at 11:33

## 2018-10-29 RX ADMIN — CEFAZOLIN SODIUM 1000 MG: 1 SOLUTION INTRAVENOUS at 23:52

## 2018-10-29 RX ADMIN — CEFAZOLIN SODIUM 1000 MG: 1 SOLUTION INTRAVENOUS at 10:31

## 2018-10-29 RX ADMIN — HEPARIN SODIUM 5000 UNITS: 5000 INJECTION, SOLUTION INTRAVENOUS; SUBCUTANEOUS at 13:31

## 2018-10-29 RX ADMIN — ASPIRIN 81 MG 81 MG: 81 TABLET ORAL at 09:10

## 2018-10-29 RX ADMIN — HEPARIN SODIUM 5000 UNITS: 5000 INJECTION, SOLUTION INTRAVENOUS; SUBCUTANEOUS at 05:13

## 2018-10-29 RX ADMIN — DOCUSATE SODIUM 100 MG: 100 CAPSULE, LIQUID FILLED ORAL at 09:10

## 2018-10-29 RX ADMIN — METOPROLOL SUCCINATE 25 MG: 25 TABLET, EXTENDED RELEASE ORAL at 09:10

## 2018-10-29 NOTE — OCCUPATIONAL THERAPY NOTE
Occupational Therapy Progress Note     Patient Name: Bulmaro Conner  NDCAX'G Date: 10/29/2018  Problem List  Patient Active Problem List   Diagnosis    Bilateral leg edema    Cellulitis    Hx of cardiac murmur    ALEX (acute kidney injury) ([address] )    Elevated brain natriuretic peptide (BNP) level    Oral thrush    Hypernatremia    Lactic acidosis    Open wound of right hip    Gait instability    Essential hypertension    Hypercholesterolemia    CKD (chronic kidney disease)    Hypoglycemia    Hypoalbuminemia    Acute metabolic encephalopathy    Transaminitis               10/29/18 1256   Restrictions/Precautions   Weight Bearing Precautions Per Order No   Other Precautions Contact/isolation; Chair Alarm; Bed Alarm;Multiple lines;Telemetry; Fall Risk   Pain Assessment   Pain Assessment No/denies pain   Bed Mobility   Rolling R 3  Moderate assistance   Additional items Assist x 2; Increased time required;Verbal cues;LE management   Supine to Sit 3  Moderate assistance   Additional items Assist x 2;Bedrails; Increased time required;Verbal cues;LE management   Additional Comments pt seated EOB with initial posterior lean and required physical (A) to correct with continuous cues to correct while seated EOB for ~2 minutes   Transfers   Sit to Stand 4  Minimal assistance   Additional items Assist x 2;Bedrails; Increased time required;Verbal cues  (RW)   Stand to Sit 4  Minimal assistance   Additional items Assist x 2; Increased time required;Verbal cues  (RW)   Functional Mobility   Functional Mobility 4  Minimal assistance   Additional Comments x1-2 pt with episodes of buckling LEs as well as appears weak and fatigued during task with increased time required to complete; pt also requires physical (A) to guide RW during session due to confusion and attention to task   Additional items Rolling walker   Cognition   Overall Cognitive Status Impaired   Arousal/Participation Alert   Attention Attends with cues to redirect   Orientation Level Oriented X4   Memory Decreased long term memory;Decreased short term memory   Following Commands Follows one step commands with increased time or repetition   Comments pt is oriented when asked questions, howvever does not follow conversation at times and appears to have difficulties with attention to tasks   Activity Tolerance   Activity Tolerance Patient limited by fatigue;Patient limited by pain   Assessment   Assessment recommend continued OT intervention per POC and recommend short term rehab prior to return home with family to promote physical conditioning and safe discharge plan   Recommendation   OT Discharge Recommendation Short Term Rehab     Pt left seated in chair at end of session; all needs within reach; all lines intact; scds connected and turned on

## 2018-10-29 NOTE — ASSESSMENT & PLAN NOTE
· With surrounding cellulitis  · History of right hip surgery in 2014 by Dr Brian Leary (Orthopedic Surgery)  · Continue IV cefazolin  · I appreciate Orthopedic Surgery's input  · The patient may need a surgical debridement of the right hip wound  · Check a MRSA nasal screen  · Pain control  · PT/OT  · Consult the wound care team  · The patient cannot have an MRI secondary to having an indwelling permanent pacemaker  · A CT scan of the right hip was completed on 10/27/2018 with the following results/impression:  IMPRESSION:     1  Soft tissue swelling overlying the right greater trochanter  Reactive or infectious bursitis is present, though associated bursal calcifications suggest chronic nature  No soft tissue gas      2  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

## 2018-10-29 NOTE — CONSULTS
Consultation - [address] 80 y o  male MRN: [id number]  Unit/Bed#: 427-01 Encounter: [id number]    Assessment/Plan     Assessment:  Necrotic wound right trochanter - Unstageable pressure ulcer - had a DTI right hip last admission  Stage 2 pressure ulcer left buttocks/gluteal cleft side    Hypoalbumenia      Plan:  Cleanse with NSS, pat dry  Skin prep to periwound  Silver alginate (maxorb with silver) to pressure ulcer, cover with 6x6 size Allevyn foam, Change Q M-W-F  Air mattress  Frequent position changes/offloading    Nutrition to see patient    History of Present Illness   HPI:  Preston Mata is a 80 y o  male who presents with a wound located on his right posterior trochanter  Unknown amount of time wound present  Recent admission to [facility] 10/5/-10/08[facility]18 due to B/L LE edema, increased pain right hip  DTI right hip and Stage 2 left buttocks POA last admission  Admitted 10/27 secondary to right hip pain  CT scan of right hip demonstrates soft tissue swelling over the right greater trochanter  Reactive or infectious bursitis is present , no soft tissue gas  Deformity of the right femoral neck 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           Inpatient consult to Wound Care  Consult performed by: Carmita Pain  Consult ordered by: Malathi Hatch          Review of Systems   Unable to perform ROS: Dementia       Historical Information   Past Medical History:   Diagnosis Date    Arthritis of knee     last assessed 11/7/14, resolved 10/2/17     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 ([address] )     last assessed 9/9/14, resolved 10/2/17     Avascular necrosis of hip ([address] )     last assessed 8/9/16, resolved 10/2/17     Bursitis of hip, right     last assessed 8/12/16, resolved 10/2/17     Cancer ([address] )     skin    Chronic kidney disease, stage 3 ([address] )     Closed intracapsular fracture of femur ([address] )     last assessed 8/9/16, resolved 10/2/17    Fracture of right hip (HCC)     Gout     last assessed 12/24/13, resolved 10/2/17     High cholesterol     Hypertension      Past Surgical History:   Procedure Laterality Date    A-V CARDIAC PACEMAKER INSERTION  10/10/2012    CARDIAC PACEMAKER PLACEMENT  2005    CATARACT EXTRACTION EXTRACAPSULAR W/ INTRAOCULAR LENS IMPLANTATION Bilateral     COLON SURGERY      COLOSTOMY  1982    CORONARY ANGIOPLASTY WITH STENT PLACEMENT      stent 1 Gradient, RLE Anteriorogram Poss Plasty Stent     HEMORRHOID SURGERY      HERNIA REPAIR  1986    bilat inguinal    HIP SURGERY Right 04/17/2014    Pin placed in hip due to fracture Dr Pollard    JOINT REPLACEMENT      R hip    POPLITEAL ARTERY ANGIOPLASTY      Femoral - Popliteal     REVISION COLOSTOMY  1983    SKIN CANCER EXCISION      WRIST FRACTURE SURGERY  11/02/2011     Social History   History   Alcohol Use No     History   Drug Use No     History   Smoking Status    Former Smoker   Smokeless Tobacco    Never Used     Family History: non-contributory    Meds/Allergies   all current active meds have been reviewed  No Known Allergies    Objective   Vitals: Blood pressure 129/64, pulse 70, temperature 97 5 °F (36 4 °C), temperature source Temporal, resp  rate 18, height 5' 10" (1 778 m), weight 67 2 kg (148 lb 2 4 oz), SpO2 95 %  Physical Exam   Constitutional: He appears cachectic  No distress  Chronically ill appearing  Advanced age   HENT:   Head: Normocephalic and atraumatic  Eyes: Scleral icterus is present  Neck: Normal range of motion  Neck supple  Cardiovascular: Normal rate, regular rhythm and normal heart sounds  Pulmonary/Chest: Effort normal and breath sounds normal  No respiratory distress  Abdominal: Soft  Bowel sounds are normal  He exhibits no distension  Neurological: He is alert  Skin: Skin is warm and dry  Lab Results: I have personally reviewed pertinent reports      Imaging: I have personally reviewed pertinent reports  EKG, Pathology, and Other Studies: I have personally reviewed pertinent reports  Code Status: Level 1 - Full Code  Advance Directive and Living Will:      Power of :    POLST:      Counseling / Coordination of Care  Total floor / unit time spent today 20 minutes  Greater than 50% of total time was spent with the patient and / or family counseling and / or coordination of care  A description of the counseling / coordination of care:wound assessment and care

## 2018-10-29 NOTE — SPEECH THERAPY NOTE
Speech-Language Pathology Bedside Swallow Evaluation      Patient Name: Nghia COTE Date: 10/29/2018     Problem List  Patient Active Problem List   Diagnosis    Bilateral leg edema    Cellulitis    Hx of cardiac murmur    ALEX (acute kidney injury) ([address] )    Elevated brain natriuretic peptide (BNP) level    Oral thrush    Hypernatremia    Lactic acidosis    Open wound of right hip    Gait instability    Essential hypertension    Hypercholesterolemia    CKD (chronic kidney disease)    Hypoglycemia    Hypoalbuminemia    Acute metabolic encephalopathy    Transaminitis       Past Medical History  Past Medical History:   Diagnosis Date    Arthritis of knee     last assessed 11/7/14, resolved 10/2/17     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 ([address] )     last assessed 9/9/14, resolved 10/2/17     Avascular necrosis of hip ([address] )     last assessed 8/9/16, resolved 10/2/17     Bursitis of hip, right     last assessed 8/12/16, resolved 10/2/17     Cancer ([address] )     skin    Chronic kidney disease, stage 3 ([address] )     Closed intracapsular fracture of femur ([address] )     last assessed 8/9/16, resolved 10/2/17    Fracture of right hip ([address] )     Gout     last assessed 12/24/13, resolved 10/2/17     High cholesterol     Hypertension        Past Surgical History  Past Surgical History:   Procedure Laterality Date    A-V CARDIAC PACEMAKER INSERTION  10/10/2012    CARDIAC PACEMAKER PLACEMENT  2005    CATARACT EXTRACTION EXTRACAPSULAR W/ INTRAOCULAR LENS IMPLANTATION Bilateral     COLON SURGERY      COLOSTOMY  1982    CORONARY ANGIOPLASTY WITH STENT PLACEMENT      stent 1 Gradient, RLE Anteriorogram Poss Plasty Stent     HEMORRHOID SURGERY      HERNIA REPAIR  1986    bilat inguinal    HIP SURGERY Right 04/17/2014    Pin placed in hip due to fracture Dr Pollard    JOINT REPLACEMENT      R hip    POPLITEAL ARTERY ANGIOPLASTY      Femoral - Popliteal     REVISION COLOSTOMY  1983    SKIN CANCER EXCISION      WRIST FRACTURE SURGERY  11/02/2011       Summary   Pt presented with functional appearing oral and pharyngeal stage swallowing skills with  soft moist solids and thin liquids  Demonstrates protracted mastication and bolus formation/transfer with regular meats as well excessive talking while eating placing pt at risk for aspiration/choking on regular food textures  Additionally pt requires his head placed in a neutral position for opitimzing safe swallow intake  Risk for Aspiration: mild secondary to cognitive impairment    Recommendations: mechanically altered/level 2 diet and thin liquids     Recommended Form of Meds: whole with puree     Aspiration precautions and compensatory swallowing strategies: upright posture, only feed when fully alert, slow rate of feeding, small bites/sips, quiet environment (tv off, limit talking, door closed, etc ) and alternating bites and sips    Current Medical Status per medicine:  Pt is a 80 y o  male who presented to [facility] with the complaint of an open wound his right hip as well as right hip pain  The patient had right hip surgery by Dr Gabe Dolan (Orthopedic Surgery) for a right hip fracture and avascular necrosis of the right hip  The patient developed a chronic, right lateral hip wound over the last several months  Over the last few days, the open wound of the right hip developed surrounding erythema as well as a purulent drainage  When the patient woke up today, 10/27/2018, the patient was experiencing severe right hip pain at the site of the open wound  The patient is currently unable to ambulate secondary to the severe right hip pain  No fevers or chills  No chest pain  No shortness of breath  No abdominal pain  No nausea or vomiting  Per chart pt was noted to become encephalopathic (metabolic) on 37/77/61 mg d/t infection and swallow consult was placed       Past medical history:   Please see H&P for details    Special Studies:  CT head 10/28/18: No acute intracranial hemorrhage or transcortical infarction  2   Generalized age-appropriate atrophy, intracranial atherosclerotic disease and mild microangiopathic disease  3   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  4   Numerous nodular scalp soft tissue lesions, recommend direct inspection and clinical correlation    Social/Education/Vocational Hx:  Pt lives with family    Swallow Information   Current Risks for Dysphagia & Aspiration: AMS     Current Symptoms/Concerns: poor po intake, confusion    Current Diet: puree/level 1 diet and nectar thick liquids      Baseline Diet: mechanically altered/level 2 diet and thin liquids      Baseline Assessment   Behavior/Cognition: alert and with confusion    Speech/Language Status: able to follow commands inconsistently and limited verbal output    Patient Positioning: upright in bed    Pain Status/Interventions/Response to Interventions:   No report of or nonverbal indications of pain  Swallow Mechanism Exam   Oral-motor structures and function are WNL for symmetry, strength, ROM & coordination based on informal assessment as pt is unable to follow directions  He wears upper and lower dentures that appear well fit  Consistencies Assessed and Performance   Consistencies Administered: thin liquids, puree, mechanical soft solids, soft solids and hard solids  Specific materials administered included applesauce, peaches, roll, and sausage    Oral Stage: mild-moderate  Mastication was adequate with the materials administered today but protracted  Bolus formation and transfer were  Prolonged and required a pureed assist with meats  No overt s/s reduced oral control  Pharyngeal Stage: suspected and minimal    Swallow Mechanics:  Swallowing initiation appeared prompt  Laryngeal rise was palpated and judged to be within functional limits   Mild throat clearing noted when talking while eating and when head was more retracted  Esophageal Concerns: chronic belching    Strategies and Efficacy: pt requires to be fed and having solids and liquids alternated  Summary and Recommendations (see above)    Results Reviewed with: RN     Treatment Recommended: yes  Frequency of treatment: 3-5x/week  Patient Stated Goal: unable to state    Dysphagia Goals per SLP: pt will tolerate level 2 dysphagia diet with thin liquids without s/s of aspiration x2-3 meals/sessions    Nursing education: initiated  Pt and caregivers would benefit from/require continued education  Speech Therapy Prognosis   Prognosis: fair    Prognosis Considerations: age, cognitive status and progressive disease process    Juan Luis Mahoney, CCC/SLP  [email or web address]  [email or web address]  Available via tiger text

## 2018-10-29 NOTE — DISCHARGE INSTR - DIET
Dysphagia 2/Mechanically altered diet (soft cooked, moist and minced foods)  Thin  Feed pt  small bites/single sips  Aspiration precautions  Decrease distractions

## 2018-10-29 NOTE — ASSESSMENT & PLAN NOTE
· Continue the hypoglycemia protocol  · Blood glucose monitoring ACHS  · Change IV fluids to D5 1/2 NSS IV fluids at 75 ml/hr  · D50 25 ml IV PRN blood glucose less than 70 mg/dl

## 2018-10-29 NOTE — PLAN OF CARE
Problem: SLP ADULT - SWALLOWING, IMPAIRED  Goal: Initial SLP swallow eval performed  Outcome: Completed Date Met: 10/29/18

## 2018-10-29 NOTE — ASSESSMENT & PLAN NOTE
· The patient is experiencing right hip pain and ambulatory dysfunction secondary to the open wound of the right hip  · PT/OT  · He may require short-term rehabilitation placement upon discharge

## 2018-10-29 NOTE — PLAN OF CARE
Problem: OCCUPATIONAL THERAPY ADULT  Goal: Performs self-care activities at highest level of function for planned discharge setting  See evaluation for individualized goals  Treatment Interventions: ADL retraining, Functional transfer training, UE strengthening/ROM, Endurance training, Patient/family training, Activityengagement          See flowsheet documentation for full assessment, interventions and recommendations      Outcome: Progressing  Limitation: Decreased ADL status, Decreased UE strength, Decreased Safe judgement during ADL, Decreased cognition, Decreased endurance, Decreased self-care trans, Decreased high-level ADLs     Assessment: recommend continued OT intervention per POC and recommend short term rehab prior to return home with family to promote physical conditioning and safe discharge plan     OT Discharge Recommendation: Short Term Rehab

## 2018-10-29 NOTE — MALNUTRITION/BMI
This medical record reflects one or more clinical indicators suggestive of malnutrition  Malnutrition Findings:   Malnutrition type: Chronic illness  Degree of Malnutrition: Other severe protein calorie malnutrition  Malnutrition Characteristics: Fat loss, Muscle loss, Inadequate energy (Severe PCM r/t suspect prolong inadequate po intake with refusal of meals since admission, muscle, fat wasting and poor skin integrity including DTI and open wound )    BMI Findings: Body mass index is 21 26 kg/m²  See Nutrition note dated 10/29/2018 for additional details  Completed nutrition assessment is viewable in the nutrition documentation

## 2018-10-29 NOTE — ASSESSMENT & PLAN NOTE
· The patient developed confusion and agitation on 10/28/2018  · The patient required haldol 1 mg IV x 1 dose and zyprexa 5 mg IM x 1 dose during the day on 10/28/2018  · The patient was placed on a continual 1:1 observation on 10/28/2018  · The acute metabolic encephalopathy is likely secondary to hospital delirium in combination with the acute infection   · The patient is not agitated at this time  · I will trial the patient off the continual 1:1 observation  · Continue to monitor closely

## 2018-10-29 NOTE — PLAN OF CARE
Problem: PHYSICAL THERAPY ADULT  Goal: Performs mobility at highest level of function for planned discharge setting  See evaluation for individualized goals  Treatment/Interventions: Functional transfer training, LE strengthening/ROM, Therapeutic exercise, Gait training, Bed mobility  Equipment Recommended: Walker       See flowsheet documentation for full assessment, interventions and recommendations  Outcome: Progressing  Prognosis: Good  Problem List: Decreased strength, Decreased endurance, Impaired balance, Decreased mobility, Decreased safety awareness, Decreased cognition, Impaired judgement  Assessment: Pt  performing bed mobility, tx/ambulation at (mod-min) x 2  Increased time to complete tasks  Limited ambulation 2* weakness and pain  Unsteady gait  He would benefit from PT to help improve strength, ROM, balance, safety, and functional activity tolerance  Recommendation: Short-term skilled PT     PT - OK to Discharge: No    See flowsheet documentation for full assessment

## 2018-10-29 NOTE — CONSULTS
Consultation - Orthopedics   Xiao Tate 80 y o  male MRN: [id number]  Unit/Bed#: 672-34 Encounter: [id number]        History of Present Illness   Physician Requesting Consult: Calvin Henning DO  Reason for Consult / Principal Problem:  Right hip pain with the wound right hip  HPI: Xiao Tate is a 80y o  year old male who presents with right hip pain draining wound right hip and inability to walk  Patient underwent ORIF of his right hip fracture in 2014  Subsequently patient developed avascular necrosis with the screw cutting out  Patient is scheduled multiple times for screw removal   Unfortunately was not medically cleared or patient did not want surgical intervention    Patient now admitted with a decubitus ulcer on the right posterior hip and a pain in the right hip with difficulty ambulating    Consults    Review of Systems    Historical Information   Past Medical History:   Diagnosis Date    Arthritis of knee     last assessed 11/7/14, resolved 10/2/17     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 ([address] )     last assessed 9/9/14, resolved 10/2/17     Avascular necrosis of hip ([address] )     last assessed 8/9/16, resolved 10/2/17     Bursitis of hip, right     last assessed 8/12/16, resolved 10/2/17     Cancer ([address] )     skin    Chronic kidney disease, stage 3 ([address] )     Closed intracapsular fracture of femur ([address] )     last assessed 8/9/16, resolved 10/2/17    Fracture of right hip ([address] )     Gout     last assessed 12/24/13, resolved 10/2/17     High cholesterol     Hypertension      Past Surgical History:   Procedure Laterality Date    A-V CARDIAC PACEMAKER INSERTION  10/10/2012    CARDIAC PACEMAKER PLACEMENT  2005    CATARACT EXTRACTION EXTRACAPSULAR W/ INTRAOCULAR LENS IMPLANTATION Bilateral     COLON SURGERY      COLOSTOMY  1982    CORONARY ANGIOPLASTY WITH STENT PLACEMENT      stent 1 Gradient, RLE Anteriorogram Poss Plasty Stent     HEMORRHOID SURGERY  HERNIA REPAIR  1986    bilat inguinal    HIP SURGERY Right 04/17/2014    Pin placed in hip due to fracture Dr Pollard    JOINT REPLACEMENT      R hip    POPLITEAL ARTERY ANGIOPLASTY      Femoral - Popliteal     REVISION COLOSTOMY  1983    SKIN CANCER EXCISION      WRIST FRACTURE SURGERY  11/02/2011     Social History   History   Alcohol Use No     History   Drug Use No     History   Smoking Status    Former Smoker   Smokeless Tobacco    Never Used     Family History: non-contributory    Meds/Allergies   all current active meds have been reviewed  No Known Allergies    Objective   Vitals: Blood pressure 137/75, pulse 71, temperature 99 3 °F (37 4 °C), temperature source Temporal, resp  rate 18, height 5' 10" (1 778 m), weight 67 2 kg (148 lb 2 4 oz), SpO2 93 %  ,Body mass index is 21 26 kg/m²  Intake/Output Summary (Last 24 hours) at 10/29/18 0737  Last data filed at 10/28/18 0900   Gross per 24 hour   Intake              120 ml   Output                0 ml   Net              120 ml     I/O last 24 hours: In: 120 [P O :120]  Out: -     Invasive Devices     Peripheral Intravenous Line            Peripheral IV 10/29/18 Left Forearm less than 1 day                Physical Exam   Patient awake not fully oriented frail elderly  Ortho Exam   Right hip decubitus ulcer measuring around 2 x 3 cm posterior lateral aspect of the hip  All movements of the right hip are painful  Radiographs show collapsed femoral head with the hip screws penetrating the joint and in the acetabulum    Lab Results: I have personally reviewed pertinent lab results  Imaging Studies: I have personally reviewed pertinent reports  EKG, Pathology, and Other Studies: I have personally reviewed pertinent reports      VTE Prophylaxis: Sequential compression device Radha Chavez)     Code Status: Level 1 - Full Code  Advance Directive and Living Will:      Power of :    POLST:      Assessment/Plan     Assessment:  Avascular necrosis right hip status post ORIF with screw cut out and penetrating the acetabulum with decubitus ulcer right hip posterior  Plan:  Patient needs wound care and a daily dressings for right hip wound  This might or might not require local or operating room debridement  Once the wound heals and patient is still symptomatic he will need hardware removal and total hip replacement  Patient not in any mental state to give consent ,will talk to family about this  They were for him to undergo any surgical intervention previously      Counseling / Coordination of Care  Total floor / unit time spent today 30 minutes  Greater than 50% of total time was spent with the patient and / or family counseling and / or coordination of care  A description of the counseling / coordination of care:   Will be done with the family later today and Dr Roxy Aceves

## 2018-10-29 NOTE — PROGRESS NOTES
Progress Note - Moraima Padron 5/13/1921, 80 y o  male MRN: [id number]    Unit/Bed#: 427-01 Encounter: [id number]    Primary Care Provider: Randell Reno DO   Date and time admitted to hospital: 10/27/2018  8:17 AM        * Open wound of right hip   Assessment & Plan    · With surrounding cellulitis  · History of right hip surgery in 2014 by Dr Jayant Tompkins (Orthopedic Surgery)  · Continue IV cefazolin  · I appreciate Orthopedic Surgery's input  · The patient may need a surgical debridement of the right hip wound  · Check a MRSA nasal screen  · Pain control  · PT/OT  · Consult the wound care team  · The patient cannot have an MRI secondary to having an indwelling permanent pacemaker  · A CT scan of the right hip was completed on 10/27/2018 with the following results/impression:  IMPRESSION:     1  Soft tissue swelling overlying the right greater trochanter  Reactive or infectious bursitis is present, though associated bursal calcifications suggest chronic nature  No soft tissue gas      2  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       Acute metabolic encephalopathy   Assessment & Plan    · The patient developed confusion and agitation on 10/28/2018  · The patient required haldol 1 mg IV x 1 dose and zyprexa 5 mg IM x 1 dose during the day on 10/28/2018  · The patient was placed on a continual 1:1 observation on 10/28/2018  · The acute metabolic encephalopathy is likely secondary to hospital delirium in combination with the acute infection   · The patient is not agitated at this time  · I will trial the patient off the continual 1:1 observation  · Continue to monitor closely     Hypoglycemia   Assessment & Plan    · Continue the hypoglycemia protocol  · Blood glucose monitoring ACHS  · Change IV fluids to D5 1/2 NSS IV fluids at 75 ml/hr  · D50 25 ml IV PRN blood glucose less than 70 mg/dl     Gait instability   Assessment & Plan    · The patient is experiencing right hip pain and ambulatory dysfunction secondary to the open wound of the right hip  · PT/OT  · He may require short-term rehabilitation placement upon discharge  Transaminitis   Assessment & Plan    · Hold statin  · Avoid all hepatotoxic agents  · Follow the liver function tests     Hypoalbuminemia   Assessment & Plan    · Nutrition/dietitian consultation     CKD (chronic kidney disease)   Assessment & Plan    · Baseline creatinine 1 0-1 5  · Avoid all nephrotoxic agents  · Serial laboratory testing to monitor his renal function and electrolytes     Hypercholesterolemia   Assessment & Plan    · Hold statin therapy with the transaminitis     Essential hypertension   Assessment & Plan    · Continue toprol XL  · Per the patient's son, the patient does not take lisinopril at this time  · Follow the blood pressure trend  · Outpatient follow-up with his PCP in regards to this matter     Lactic acidosis   Assessment & Plan    · Resolved with IV fluids  · Follow the procalcitonin     Hypernatremia   Assessment & Plan    · Resolved with 1/2 NSS IV fluids  · Follow the sodium level         VTE Pharmacologic Prophylaxis:   Pharmacologic: Heparin  Mechanical VTE Prophylaxis in Place: Yes    Patient Centered Rounds: I have performed bedside rounds with nursing staff today  Discussions with Specialists or Other Care Team Provider:  I discussed the case with Dr Chandrika Nunez (Orthopedic Surgery)  Time Spent for Care: 20 minutes  More than 50% of total time spent on counseling and coordination of care as described above      Current Length of Stay: 2 day(s)    Current Patient Status: Inpatient   Certification Statement: The patient will continue to require additional inpatient hospital stay due to the need for IV antibiotics, for PT/OT, and the possible need for short-term rehabilitation placement upon discharge  Code Status: Level 1 - Full Code      Subjective: The patient was seen and examined  The patient was agitated and confused on 10/28/2018  He is no longer agitated this morning  The patient is currently complaining of right hip pain  Objective:     Vitals:   Temp (24hrs), Av 5 °F (36 9 °C), Min:97 5 °F (36 4 °C), Max:99 3 °F (37 4 °C)    Temp:  [97 5 °F (36 4 °C)-99 3 °F (37 4 °C)] 97 5 °F (36 4 °C)  HR:  [70-78] 70  Resp:  [18] 18  BP: (129-152)/(64-76) 129/64  SpO2:  [93 %-97 %] 95 %  Body mass index is 21 26 kg/m²       Input and Output Summary (last 24 hours):     No intake or output data in the 24 hours ending 10/29/18 1002    Physical Exam:     Physical Exam  General:  NAD, awake, confused, oriented to self only at this time  HEENT:  NC/AT, mucous membranes moist  Neck:  Supple, No JVP elevation  CV:  + S1, + S2, RRR  Pulm:  Lung fields are CTA bilaterally  Abd:  Soft, Non-tender, Non-distended  Ext:  No clubbing/cyanosis/edema  Skin:  No rashes, right lateral hip with wound dressing intact      Additional Data:     Labs:      Results from last 7 days  Lab Units 10/29/18  0541   WBC Thousand/uL 7 48   HEMOGLOBIN g/dL 12 8   HEMATOCRIT % 40 5   PLATELETS Thousands/uL 269   NEUTROS PCT % 74   LYMPHS PCT % 12*   MONOS PCT % 11   EOS PCT % 2       Results from last 7 days  Lab Units 10/29/18  0648   SODIUM mmol/L 140   POTASSIUM mmol/L 4 8   CHLORIDE mmol/L 109*   CO2 mmol/L 17*   BUN mg/dL 23   CREATININE mg/dL 1 31*   ANION GAP mmol/L 14*   CALCIUM mg/dL 8 9   ALBUMIN g/dL 2 7*   TOTAL BILIRUBIN mg/dL 0 80   ALK PHOS U/L 103   ALT U/L 10*   AST U/L 56*           Results from last 7 days  Lab Units 10/29/18  0759 10/28/18  2121   POC GLUCOSE mg/dl 82 87           Results from last 7 days  Lab Units 10/28/18  0444 10/27/18  1436 10/27/18  6184 LACTIC ACID mmol/L  --  0 8 2 5*   PROCALCITONIN ng/ml <0 05 <0 05  --            * I Have Reviewed All Lab Data Listed Above  * Additional Pertinent Lab Tests Reviewed: [address] Admission Reviewed      Recent Cultures (last 7 days):       Results from last 7 days  Lab Units 10/27/18  0842 10/27/18  0840   BLOOD CULTURE  No Growth at 24 hrs  No Growth at 24 hrs  Last 24 Hours Medication List:     Current Facility-Administered Medications:  acetaminophen 650 mg Oral Q6H PRN Fely Sadler, DO    aspirin 81 mg Oral Daily Fely Sadler, DO    cefazolin 1,000 mg Intravenous Q12H Fely Sadler, DO Last Rate: 1,000 mg (10/28/18 1112)   dextrose 5 % and sodium chloride 0 45 % 75 mL/hr Intravenous Continuous Esequiel Harding,     dextrose 25 mL Intravenous PRN Fely Sadler, DO    docusate sodium 100 mg Oral BID Fely Sadler, DO    heparin (porcine) 5,000 Units Subcutaneous Q8H [address] Esequiel Harding,     HYDROmorphone 0 2 mg Intravenous Q3H PRN Fely Sadler, DO    metoprolol succinate 25 mg Oral Daily Fely Sadler, DO    OLANZapine 10 mg Oral BID PRN Selam Waterman MD    ondansetron 4 mg Intravenous Q4H PRN Fely Sadler, DO    oxyCODONE 5 mg Oral Q4H PRN Fely Sadler,          Today, Patient Was Seen By: Fely Sadler DO    ** Please Note: Dictation voice to text software may have been used in the creation of this document   **

## 2018-10-30 PROBLEM — E16.2 HYPOGLYCEMIA: Status: RESOLVED | Noted: 2018-10-28 | Resolved: 2018-10-30

## 2018-10-30 PROBLEM — G93.41 ACUTE METABOLIC ENCEPHALOPATHY: Status: RESOLVED | Noted: 2018-10-29 | Resolved: 2018-10-30

## 2018-10-30 PROBLEM — E87.0 HYPERNATREMIA: Status: RESOLVED | Noted: 2018-10-27 | Resolved: 2018-10-30

## 2018-10-30 PROBLEM — E87.2 LACTIC ACIDOSIS: Status: RESOLVED | Noted: 2018-10-27 | Resolved: 2018-10-30

## 2018-10-30 LAB
ALBUMIN SERPL BCP-MCNC: 2.5 G/DL (ref 3.5–5)
ALP SERPL-CCNC: 92 U/L (ref 46–116)
ALT SERPL W P-5'-P-CCNC: 6 U/L (ref 12–78)
ANION GAP SERPL CALCULATED.3IONS-SCNC: 12 MMOL/L (ref 4–13)
AST SERPL W P-5'-P-CCNC: 47 U/L (ref 5–45)
BACTERIA UR CULT: NORMAL
BILIRUB SERPL-MCNC: 0.6 MG/DL (ref 0.2–1)
BUN SERPL-MCNC: 19 MG/DL (ref 5–25)
CALCIUM SERPL-MCNC: 8.3 MG/DL (ref 8.3–10.1)
CHLORIDE SERPL-SCNC: 108 MMOL/L (ref 100–108)
CK MB SERPL-MCNC: 2.2 % (ref 0–2.5)
CK MB SERPL-MCNC: 8.1 NG/ML (ref 0–5)
CK SERPL-CCNC: 372 U/L (ref 39–308)
CO2 SERPL-SCNC: 22 MMOL/L (ref 21–32)
CREAT SERPL-MCNC: 1.19 MG/DL (ref 0.6–1.3)
ERYTHROCYTE [DISTWIDTH] IN BLOOD BY AUTOMATED COUNT: 13.9 % (ref 11.6–15.1)
GFR SERPL CREATININE-BSD FRML MDRD: 51 ML/MIN/1.73SQ M
GLUCOSE SERPL-MCNC: 103 MG/DL (ref 65–140)
GLUCOSE SERPL-MCNC: 111 MG/DL (ref 65–140)
GLUCOSE SERPL-MCNC: 83 MG/DL (ref 65–140)
GLUCOSE SERPL-MCNC: 89 MG/DL (ref 65–140)
HAV IGM SER QL: NORMAL
HBV CORE IGM SER QL: NORMAL
HBV SURFACE AG SER QL: NORMAL
HCT VFR BLD AUTO: 40.5 % (ref 36.5–49.3)
HCV AB SER QL: NORMAL
HGB BLD-MCNC: 13 G/DL (ref 12–17)
MAGNESIUM SERPL-MCNC: 1.7 MG/DL (ref 1.6–2.6)
MCH RBC QN AUTO: 29.7 PG (ref 26.8–34.3)
MCHC RBC AUTO-ENTMCNC: 32.1 G/DL (ref 31.4–37.4)
MCV RBC AUTO: 93 FL (ref 82–98)
PHOSPHATE SERPL-MCNC: 2.6 MG/DL (ref 2.3–4.1)
PLATELET # BLD AUTO: 257 THOUSANDS/UL (ref 149–390)
PMV BLD AUTO: 11.7 FL (ref 8.9–12.7)
POTASSIUM SERPL-SCNC: 3.4 MMOL/L (ref 3.5–5.3)
PROCALCITONIN SERPL-MCNC: 0.06 NG/ML
PROT SERPL-MCNC: 5.7 G/DL (ref 6.4–8.2)
RBC # BLD AUTO: 4.38 MILLION/UL (ref 3.88–5.62)
SODIUM SERPL-SCNC: 142 MMOL/L (ref 136–145)
WBC # BLD AUTO: 7.2 THOUSAND/UL (ref 4.31–10.16)

## 2018-10-30 PROCEDURE — 84100 ASSAY OF PHOSPHORUS: CPT | Performed by: INTERNAL MEDICINE

## 2018-10-30 PROCEDURE — 99231 SBSQ HOSP IP/OBS SF/LOW 25: CPT | Performed by: PHYSICIAN ASSISTANT

## 2018-10-30 PROCEDURE — 82550 ASSAY OF CK (CPK): CPT | Performed by: INTERNAL MEDICINE

## 2018-10-30 PROCEDURE — G8998 SWALLOW D/C STATUS: HCPCS | Performed by: SPEECH-LANGUAGE PATHOLOGIST

## 2018-10-30 PROCEDURE — 82948 REAGENT STRIP/BLOOD GLUCOSE: CPT

## 2018-10-30 PROCEDURE — 84145 PROCALCITONIN (PCT): CPT | Performed by: INTERNAL MEDICINE

## 2018-10-30 PROCEDURE — 97110 THERAPEUTIC EXERCISES: CPT

## 2018-10-30 PROCEDURE — 0JBL0ZZ EXCISION OF RIGHT UPPER LEG SUBCUTANEOUS TISSUE AND FASCIA, OPEN APPROACH: ICD-10-PCS | Performed by: ORTHOPAEDIC SURGERY

## 2018-10-30 PROCEDURE — 85027 COMPLETE CBC AUTOMATED: CPT | Performed by: INTERNAL MEDICINE

## 2018-10-30 PROCEDURE — 80074 ACUTE HEPATITIS PANEL: CPT | Performed by: INTERNAL MEDICINE

## 2018-10-30 PROCEDURE — 92526 ORAL FUNCTION THERAPY: CPT | Performed by: SPEECH-LANGUAGE PATHOLOGIST

## 2018-10-30 PROCEDURE — 99232 SBSQ HOSP IP/OBS MODERATE 35: CPT | Performed by: INTERNAL MEDICINE

## 2018-10-30 PROCEDURE — 80053 COMPREHEN METABOLIC PANEL: CPT | Performed by: INTERNAL MEDICINE

## 2018-10-30 PROCEDURE — 83735 ASSAY OF MAGNESIUM: CPT | Performed by: INTERNAL MEDICINE

## 2018-10-30 PROCEDURE — 82553 CREATINE MB FRACTION: CPT | Performed by: INTERNAL MEDICINE

## 2018-10-30 RX ORDER — BUPIVACAINE HYDROCHLORIDE AND EPINEPHRINE 2.5; 5 MG/ML; UG/ML
20 INJECTION, SOLUTION EPIDURAL; INFILTRATION; INTRACAUDAL; PERINEURAL ONCE AS NEEDED
Status: DISCONTINUED | OUTPATIENT
Start: 2018-10-30 | End: 2018-11-05 | Stop reason: HOSPADM

## 2018-10-30 RX ADMIN — ASPIRIN 81 MG 81 MG: 81 TABLET ORAL at 08:00

## 2018-10-30 RX ADMIN — HEPARIN SODIUM 5000 UNITS: 5000 INJECTION, SOLUTION INTRAVENOUS; SUBCUTANEOUS at 05:30

## 2018-10-30 RX ADMIN — DOCUSATE SODIUM 100 MG: 100 CAPSULE, LIQUID FILLED ORAL at 08:00

## 2018-10-30 RX ADMIN — HEPARIN SODIUM 5000 UNITS: 5000 INJECTION, SOLUTION INTRAVENOUS; SUBCUTANEOUS at 13:58

## 2018-10-30 RX ADMIN — HEPARIN SODIUM 5000 UNITS: 5000 INJECTION, SOLUTION INTRAVENOUS; SUBCUTANEOUS at 21:10

## 2018-10-30 RX ADMIN — METOPROLOL SUCCINATE 25 MG: 25 TABLET, EXTENDED RELEASE ORAL at 08:00

## 2018-10-30 RX ADMIN — CEFAZOLIN SODIUM 1000 MG: 1 SOLUTION INTRAVENOUS at 11:53

## 2018-10-30 RX ADMIN — DEXTROSE AND SODIUM CHLORIDE 75 ML/HR: 5; .45 INJECTION, SOLUTION INTRAVENOUS at 01:44

## 2018-10-30 NOTE — PROGRESS NOTES
Progress Note - Orthopedics   Chan Lozada 80 y o  male MRN: [id number]  Unit/Bed#: 427-01      Subjective:    80 y  o male with posterior right hip skin decubitus  Patient denies gross hip pain at current  No acute events, no complaints  Pt doing well  Pain controlled   Denies fevers chills, CP, SOB    Labs:    0  Lab Value Date/Time   HCT 40 5 10/30/2018 0605   HCT 40 5 10/29/2018 0541   HCT 40 3 10/28/2018 0444   HCT 31 0 (L) 06/13/2014 0543   HCT 29 7 (L) 06/11/2014 0612   HCT 28 6 (L) 06/10/2014 0446   HGB 13 0 10/30/2018 0605   HGB 12 8 10/29/2018 0541   HGB 12 5 10/28/2018 0444   HGB 9 6 (L) 06/13/2014 0543   HGB 9 2 (L) 06/11/2014 0612   HGB 8 8 (L) 06/10/2014 0446   INR 1 15 10/05/2018 1139   INR 1 33 (H) 06/09/2014 1323   WBC 7 20 10/30/2018 0605   WBC 7 48 10/29/2018 0541   WBC 6 67 10/28/2018 0444   WBC 7 02 06/13/2014 0543   WBC 7 56 06/11/2014 0612   WBC 10 72 (H) 06/10/2014 0446     Meds:    Current Facility-Administered Medications:     acetaminophen (TYLENOL) tablet 650 mg, 650 mg, Oral, Q6H PRN, [facility], DO    aspirin chewable tablet 81 mg, 81 mg, Oral, Daily, [facility], DO, 81 mg at 10/29/18 0910    ceFAZolin (ANCEF) IVPB (premix) 1,000 mg, 1,000 mg, Intravenous, Q12H, [facility], DO, Last Rate: 100 mL/hr at 10/29/18 2352, 1,000 mg at 10/29/18 2352    dextrose 5 % and sodium chloride 0 45 % infusion, 75 mL/hr, Intravenous, Continuous, [facility], DO, Last Rate: 75 mL/hr at 10/30/18 0144, 75 mL/hr at 10/30/18 0144    dextrose 50 % IV solution 25 mL, 25 mL, Intravenous, PRN, [facility], DO    docusate sodium (COLACE) capsule 100 mg, 100 mg, Oral, BID, [facility], DO, 100 mg at 10/29/18 1739    heparin (porcine) subcutaneous injection 5,000 Units, 5,000 Units, Subcutaneous, Q8H [facility], [facility], DO, 5,000 Units at 10/30/18 0530    HYDROmorphone (DILAUDID) injection 0 2 mg, 0 2 mg, Intravenous, Q3H PRN, Henrietta Lunar, DO, 0 2 mg at 10/27/18 1329    metoprolol succinate (TOPROL-XL) 24 hr tablet 25 mg, 25 mg, Oral, Daily, Rosaland Litter, DO, 25 mg at 10/29/18 0910    OLANZapine (ZyPREXA ZYDIS) dispersible tablet 10 mg, 10 mg, Oral, BID PRN, Selam Waterman MD    ondansetron (ZOFRAN) injection 4 mg, 4 mg, Intravenous, Q4H PRN, Rosaland Litter, DO    oxyCODONE (ROXICODONE) IR tablet 5 mg, 5 mg, Oral, Q4H PRN, [facility] Litter, DO    Blood Culture:   Lab Results   Component Value Date    BLOODCX No Growth at 48 hrs  10/27/2018     Wound Culture:   No results found for: WOUNDCULT    Ins and Outs:  I/O last 24 hours: In: 1805 [I V :1805]  Out: 350 [Urine:350]    Physical:  Vitals:    10/29/18 2352   BP: 160/78   Pulse: 73   Resp: 18   Temp: 99 5 °F (37 5 °C)   SpO2: 93%     Musculoskeletal: right Lower Extremity  · Skin 7 cm round area decubitus posterior right hip dorsal to the greater trochanter with dressing in place by wound care  This does not appear to be very deep and should hopefully heal without surgical debridement  · Denies gross pain to range of motion of the hip log-rolling  · Patient grossly neurovascular intact right lower extremity  Assessment:    80 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

## 2018-10-30 NOTE — PLAN OF CARE
Problem: Potential for Falls  Goal: Patient will remain free of falls  INTERVENTIONS:  - Assess patient frequently for physical needs  -  Identify cognitive and physical deficits and behaviors that affect risk of falls  -  [place] fall precautions as indicated by assessment  High fall risk    - Educate patient/family on patient safety including physical limitations  - Instruct patient to call for assistance with activity based on assessment  - Modify environment to reduce risk of injury  - Consider OT/PT consult to assist with strengthening/mobility     Outcome: Progressing      Problem: Prexisting or High Potential for Compromised Skin Integrity  Goal: Skin integrity is maintained or improved  INTERVENTIONS:  - Identify patients at risk for skin breakdown  - Assess and monitor skin integrity  - Assess and monitor nutrition and hydration status  - Monitor labs (i e  albumin)  - Assess for incontinence   - Turn and reposition patient  - Assist with mobility/ambulation  - Relieve pressure over bony prominences  - Avoid friction and shearing  - Provide appropriate hygiene as needed including keeping skin clean and dry  - Evaluate need for skin moisturizer/barrier cream  - Collaborate with interdisciplinary team (i e  Nutrition, Rehabilitation, etc )   - Patient/family teaching   Outcome: Progressing      Problem: Nutrition/Hydration-ADULT  Goal: Nutrient/Hydration intake appropriate for improving, restoring or maintaining nutritional needs  Monitor and assess patient's nutrition/hydration status for malnutrition (ex- brittle hair, bruises, dry skin, pale skin and conjunctiva, muscle wasting, smooth red tongue, and disorientation)  Collaborate with interdisciplinary team and initiate plan and interventions as ordered  Monitor patient's weight and dietary intake as ordered or per policy  Utilize nutrition screening tool and intervene per policy   Determine patient's food preferences and provide high-protein, high-caloric foods as appropriate       INTERVENTIONS:  - Monitor oral intake, urinary output, labs, and treatment plans  - Assess nutrition and hydration status and recommend course of action  - Evaluate amount of meals eaten  - Assist patient with eating if necessary   - Allow adequate time for meals  - Recommend/ encourage appropriate diets, oral nutritional supplements, and vitamin/mineral supplements  - Order, calculate, and assess calorie counts as needed  - Recommend, monitor, and adjust tube feedings and TPN/PPN based on assessed needs  - Assess need for intravenous fluids  - Provide specific nutrition/hydration education as appropriate  - Include patient/family/caregiver in decisions related to nutrition   Outcome: Progressing      Problem: PAIN - ADULT  Goal: Verbalizes/displays adequate comfort level or baseline comfort level  Interventions:  - Encourage patient to monitor pain and request assistance  - Assess pain using appropriate pain scale  - Administer analgesics based on type and severity of pain and evaluate response  - Implement non-pharmacological measures as appropriate and evaluate response  - Consider cultural and social influences on pain and pain management  - Notify physician/advanced practitioner if interventions unsuccessful or patient reports new pain   Outcome: Progressing      Problem: INFECTION - ADULT  Goal: Absence or prevention of progression during hospitalization  INTERVENTIONS:  - Assess and monitor for signs and symptoms of infection  - Monitor lab/diagnostic results  - Monitor all insertion sites, i e  indwelling lines, tubes, and drains  - Administer medications as ordered  - Instruct and encourage patient and family to use good hand hygiene technique  - Identify and instruct in appropriate isolation precautions for identified infection/condition    Outcome: Progressing      Problem: SAFETY ADULT  Goal: Maintain or return to baseline ADL function  INTERVENTIONS:  -  Assess patient's ability to carry out ADLs; assess patient's baseline for ADL function and identify physical deficits which impact ability to perform ADLs (bathing, care of mouth/teeth, toileting, grooming, dressing, etc )  - Assess/evaluate cause of self-care deficits   - Assess range of motion  - Assess patient's mobility; develop plan if impaired  - Assess patient's need for assistive devices and provide as appropriate  - Encourage maximum independence but intervene and supervise when necessary  ¯ Involve family in performance of ADLs  ¯ Assess for home care needs following discharge   ¯ Request OT consult to assist with ADL evaluation and planning for discharge  ¯ Provide patient education as appropriate   Outcome: Progressing    Goal: Maintain or return mobility status to optimal level  INTERVENTIONS:  - Assess patient's baseline mobility status (ambulation, transfers, stairs, etc )    - Identify cognitive and physical deficits and behaviors that affect mobility  - Identify mobility aids required to assist with transfers and/or ambulation (gait belt, sit-to-stand, lift, walker, cane, etc )  - [place] fall precautions as indicated by assessment  - Record patient progress and toleration of activity level on Mobility SBAR; progress patient to next Phase/Stage  - Instruct patient to call for assistance with activity based on assessment  - Request Rehabilitation consult to assist with strengthening/weightbearing, etc    Outcome: Progressing    Goal: Patient will remain free of falls  INTERVENTIONS:  - Assess patient frequently for physical needs  -  Identify cognitive and physical deficits and behaviors that affect risk of falls  -  [place] fall precautions as indicated by assessment   High fall risk    - Educate patient/family on patient safety including physical limitations  - Instruct patient to call for assistance with activity based on assessment  - Modify environment to reduce risk of injury  - Consider OT/PT consult to assist with strengthening/mobility     Outcome: Progressing      Problem: DISCHARGE PLANNING  Goal: Discharge to home or other facility with appropriate resources  INTERVENTIONS:  - Identify barriers to discharge w/patient and caregiver  - Arrange for needed discharge resources and transportation as appropriate  - Identify discharge learning needs (meds, wound care, etc )  - Arrange for interpretive services to assist at discharge as needed  - Refer to Case Management Department for coordinating discharge planning if the patient needs post-hospital services based on physician/advanced practitioner order or complex needs related to functional status, cognitive ability, or social support system   Outcome: Progressing      Problem: Knowledge Deficit  Goal: Patient/family/caregiver demonstrates understanding of disease process, treatment plan, medications, and discharge instructions  Complete learning assessment and assess knowledge base    Interventions:  - Provide teaching at level of understanding  - Provide teaching via preferred learning methods   Outcome: Progressing      Problem: METABOLIC, FLUID AND ELECTROLYTES - ADULT  Goal: Electrolytes maintained within normal limits  INTERVENTIONS:  - Monitor labs and assess patient for signs and symptoms of electrolyte imbalances  - Administer electrolyte replacement as ordered  - Monitor response to electrolyte replacements, including repeat lab results as appropriate  - Instruct patient on fluid and nutrition as appropriate   Outcome: Progressing    Goal: Fluid balance maintained  INTERVENTIONS:  - Monitor labs and assess for signs and symptoms of volume excess or deficit  - Monitor I/O and WT  - Instruct patient on fluid and nutrition as appropriate   Outcome: Progressing      Problem: SKIN/TISSUE INTEGRITY - ADULT  Goal: Skin integrity remains intact  INTERVENTIONS  - Identify patients at risk for skin breakdown  - Assess and monitor skin integrity  - Assess and monitor nutrition and hydration status  - Monitor labs (i e  albumin)  - Assess for incontinence   - Turn and reposition patient  - Assist with mobility/ambulation  - Relieve pressure over bony prominences  - Avoid friction and shearing  - Provide appropriate hygiene as needed including keeping skin clean and dry  - Evaluate need for skin moisturizer/barrier cream  - Collaborate with interdisciplinary team (i e  Nutrition, Rehabilitation, etc )   - Patient/family teaching   Outcome: Progressing    Goal: Incision(s), wounds(s) or drain site(s) healing without S/S of infection  INTERVENTIONS  - Assess and document risk factors for skin impairment   - Assess and document dressing, incision, wound bed, drain sites and surrounding tissue  - Initiate Nutrition services consult and/or wound management as needed   Outcome: Progressing    Goal: Oral mucous membranes remain intact  INTERVENTIONS  - Assess oral mucosa and hygiene practices  - Implement preventative oral hygiene regimen  - Implement oral medicated treatments as ordered  - Initiate Nutrition services referral as needed   Outcome: Progressing      Problem: MUSCULOSKELETAL - ADULT  Goal: Maintain or return mobility to safest level of function  INTERVENTIONS:  - Assess patient's ability to carry out ADLs; assess patient's baseline for ADL function and identify physical deficits which impact ability to perform ADLs (bathing, care of mouth/teeth, toileting, grooming, dressing, etc )  - Assess/evaluate cause of self-care deficits   - Assess range of motion  - Assess patient's mobility; develop plan if impaired  - Assess patient's need for assistive devices and provide as appropriate  - Encourage maximum independence but intervene and supervise when necessary  - Involve family in performance of ADLs  - Assess for home care needs following discharge   - Request OT consult to assist with ADL evaluation and planning for discharge  - Provide patient education as appropriate   Outcome: Progressing      Problem: DISCHARGE PLANNING - CARE MANAGEMENT  Goal: Discharge to post-acute care or home with appropriate resources  INTERVENTIONS:  - Conduct assessment to determine patient/family and health care team treatment goals, and need for post-acute services based on payer coverage, community resources, and patient preferences, and barriers to discharge  - Address psychosocial, clinical, and financial barriers to discharge as identified in assessment in conjunction with the patient/family and health care team  - Arrange appropriate level of post-acute services according to patient's   needs and preference and payer coverage in collaboration with the physician and health care team  - Communicate with and update the patient/family, physician, and health care team regarding progress on the discharge plan  - Arrange appropriate transportation to post-acute venues   Outcome: Progressing

## 2018-10-30 NOTE — PHYSICAL THERAPY NOTE
PT treatment note      10/30/18 1330   Pressure Ulcer 10/05/18 Hip Right DTI evolving stage three   Date First Assessed/Time First Assessed: 10/05/18 1455   Pre-Existing Wound: Yes  Location: Hip  Orientation: Right  Wound Description (Comments): DTI evolving stage three   Dressing Dry dressing; Other (Comment)  (pressure dressing )   Subjective   Subjective States he has no pain  Agreeable to theraeutic exercises   Bed Mobility   Rolling R 4  Minimal assistance   Additional items Assist x 1;Verbal cues   Rolling L 4  Minimal assistance   Additional items Assist x 1;Verbal cues   Transfers   Additional Comments tx/ambulation deferred per nursing 2* procedure just performed by Dr lopez and pressure dressing applied  OK for ROM this session  hold ambulation per nurse    Exercises   Heelslides Supine;10 reps;Bilateral   Hip Abduction Supine;20 reps; Left   Hip Adduction Supine;20 reps; Left   Knee AROM Short Arc Quad 20 reps;Bilateral   Ankle Pumps Supine;20 reps;Bilateral   Assessment   Prognosis Good   Problem List Decreased strength;Decreased endurance; Impaired balance;Decreased mobility   Assessment performed therapeutic exercises as above with good tolerance  Held tx/gait per nursing 2* I & D procedure  He would benefit from continued  PT to help improve strength, ROM, balance, safety, and functional activity tolerance  Plan   Treatment/Interventions Functional transfer training;LE strengthening/ROM; Therapeutic exercise; Endurance training;Bed mobility;Gait training   Progress Slow progress, decreased activity tolerance   Recommendation   Recommendation Short-term skilled PT   Pt  In bed  with call bell within reach, scd's connected and turned on and alarm on at end of PT session  Discussed with Paul Garcia, PT today's treatment and patient's current level of function for care coordination

## 2018-10-30 NOTE — OCCUPATIONAL THERAPY NOTE
OT Daily Note     10/30/18 8719   Pain Assessment   Pain Assessment No/denies pain   Therapeutic Excerise-Strength   UE Strength Yes   Right Upper Extremity- Strength   R Shoulder Flexion;ABduction;Horizontal ABduction  (protraction/retraction)   R Elbow Elbow flexion;Elbow extension   R Wrist (pronation/supination)   R Weight/Reps/Sets 1 lb  20 reps shoulder planes, 30 reps elbow/forearm   Left Upper Extremity-Strength   L Shoulder Flexion;ABduction;Horizontal ABduction  (protraction/retraction)   L Elbow Elbow flexion;Elbow extension   L Wrist (pronation/supination)   L Weights/Reps/Sets 1 #  20 reps shoulder, 30 reps elbow/forarm   Cognition   Arousal/Participation Alert; Cooperative   Attention Within functional limits   Activity Tolerance   Activity Tolerance Patient tolerated treatment well   Assessment   Assessment Pt mobility deferred per nsg request due to procedure at hip  Pt was agreeable to ther ex/ strengthening to increase overall strength and activity tolerance in order to increase participation in self care and functional transfer/mobility  Pt toleranted well, denied pain  Given brief rest periods with very minimal fatigue note  Plan to cont with OT POC

## 2018-10-30 NOTE — PLAN OF CARE
Problem: PHYSICAL THERAPY ADULT  Goal: Performs mobility at highest level of function for planned discharge setting  See evaluation for individualized goals  Treatment/Interventions: Functional transfer training, LE strengthening/ROM, Therapeutic exercise, Gait training, Bed mobility  Equipment Recommended: Walker       See flowsheet documentation for full assessment, interventions and recommendations  Outcome: Progressing  Prognosis: Good  Problem List: Decreased strength, Decreased endurance, Impaired balance, Decreased mobility  Assessment: performed therapeutic exercises as above with good tolerance  Held tx/gait per nursing 2* I & D procedure  He would benefit from continued  PT to help improve strength, ROM, balance, safety, and functional activity tolerance  Recommendation: Short-term skilled PT     PT - OK to Discharge: No    See flowsheet documentation for full assessment

## 2018-10-30 NOTE — ASSESSMENT & PLAN NOTE
· With surrounding cellulitis  · History of right hip surgery in 2014 by Dr Cyrus Eckert (Orthopedic Surgery)  · Continue IV cefazolin  · Bedside debridement was performed today 10/30/2018  · Consult the wound care team  · The patient cannot have an MRI secondary to having an indwelling permanent pacemaker  · A CT scan of the right hip was completed on 10/27/2018 with the following results/impression:  IMPRESSION:     1  Soft tissue swelling overlying the right greater trochanter  Reactive or infectious bursitis is present, though associated bursal calcifications suggest chronic nature  No soft tissue gas      2  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

## 2018-10-30 NOTE — ASSESSMENT & PLAN NOTE
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

## 2018-10-30 NOTE — PROGRESS NOTES
Bedside I&D performed by Dr Genesis Hunter, pt tolerated well  Pressure dressing applied, by Dr Genesis Hunter

## 2018-10-30 NOTE — PLAN OF CARE
Problem: OCCUPATIONAL THERAPY ADULT  Goal: Performs self-care activities at highest level of function for planned discharge setting  See evaluation for individualized goals  Treatment Interventions: ADL retraining, Functional transfer training, UE strengthening/ROM, Endurance training, Patient/family training, Activityengagement          See flowsheet documentation for full assessment, interventions and recommendations  Outcome: Progressing  Limitation: Decreased ADL status, Decreased UE strength, Decreased Safe judgement during ADL, Decreased cognition, Decreased endurance, Decreased self-care trans, Decreased high-level ADLs     Assessment: Pt mobility deferred per nsg request due to procedure at hip  Pt was agreeable to ther ex/ strengthening to increase overall strength and activity tolerance in order to increase participation in self care and functional transfer/mobility  Pt toleranted well, denied pain  Given brief rest periods with very minimal fatigue note  Plan to cont with OT POC       OT Discharge Recommendation: Short Term Rehab

## 2018-10-30 NOTE — PROGRESS NOTES
Progress Note Al Hannah 5/13/1921, 80 y o  male MRN: [id number]    Unit/Bed#: 427-01 Encounter: [id number]    Primary Care Provider: Thomas Murrieta DO   Date and time admitted to hospital: 10/27/2018  8:17 AM        * Open wound of right hip   Assessment & Plan    · With surrounding cellulitis  · History of right hip surgery in 2014 by Dr Princess Atkinson (Orthopedic Surgery)  · Continue IV cefazolin  · Bedside debridement was performed today 10/30/2018  · Consult the wound care team  · The patient cannot have an MRI secondary to having an indwelling permanent pacemaker  · A CT scan of the right hip was completed on 10/27/2018 with the following results/impression:  IMPRESSION:     1  Soft tissue swelling overlying the right greater trochanter  Reactive or infectious bursitis is present, though associated bursal calcifications suggest chronic nature  No soft tissue gas      2  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  Abnormal CT scan of head   Assessment & Plan    Impression:     1  Limited study due to patient motion  No acute intracranial hemorrhage or transcortical infarction  2   Generalized age-appropriate atrophy, intracranial atherosclerotic disease and mild microangiopathic disease  3   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  4   Numerous nodular scalp soft tissue lesions, recommend direct inspection and clinical correlation         CKD (chronic kidney disease)   Assessment & Plan    · Baseline creatinine 1 0-1 5  · Avoid all nephrotoxic agents  · Serial laboratory testing to monitor his renal function and electrolytes     Essential hypertension Assessment & Plan    · Continue toprol XL  · Per the patient's son, the patient does not take lisinopril at this time  · Follow the blood pressure trend  · Outpatient follow-up with his PCP in regards to this matter     Gait instability   Assessment & Plan    · The patient is experiencing right hip pain and ambulatory dysfunction secondary to the open wound of the right hip  · PT/OT  · He may require short-term rehabilitation placement upon discharge  Current Length of Stay: 3 day(s)    Current Patient Status: Inpatient   Certification Statement: The patient will continue to require additional inpatient hospital stay due to right hip cellulitis    Discharge Plan / Estimated Discharge Date:  10/31/2018      Code Status: Level 1 - Full Code      Subjective:   No pain    Objective:     Vitals:   Temp (24hrs), Av 7 °F (36 5 °C), Min:96 1 °F (35 6 °C), Max:99 5 °F (37 5 °C)    Temp:  [96 1 °F (35 6 °C)-99 5 °F (37 5 °C)] 97 4 °F (36 3 °C)  HR:  [73-79] 73  Resp:  [18] 18  BP: (145-160)/(66-78) 149/66  SpO2:  [92 %-93 %] 92 %  Body mass index is 21 26 kg/m²  Input and Output Summary (last 24 hours): Intake/Output Summary (Last 24 hours) at 10/30/18 1421  Last data filed at 10/30/18 0840   Gross per 24 hour   Intake             1160 ml   Output              350 ml   Net              810 ml       Physical Exam:     Physical Exam   Constitutional: He is oriented to person, place, and time  He appears well-developed and well-nourished  No distress  Cachectic elderly male, no acute distress   Pulmonary/Chest: Effort normal and breath sounds normal  No respiratory distress  He has no wheezes  Abdominal: Soft  Bowel sounds are normal  He exhibits no distension  There is no tenderness  Neurological: He is alert and oriented to person, place, and time  No cranial nerve deficit  Coordination normal    Skin: Skin is warm and dry  No rash noted  He is not diaphoretic  No erythema     Psychiatric: He has a normal mood and affect  His behavior is normal  Judgment and thought content normal    Nursing note and vitals reviewed  Additional Data:     Labs:      Results from last 7 days  Lab Units 10/30/18  0605 10/29/18  0541   WBC Thousand/uL 7 20 7 48   HEMOGLOBIN g/dL 13 0 12 8   HEMATOCRIT % 40 5 40 5   PLATELETS Thousands/uL 257 269   NEUTROS PCT %  --  74   LYMPHS PCT %  --  12*   MONOS PCT %  --  11   EOS PCT %  --  2       Results from last 7 days  Lab Units 10/30/18  0605   SODIUM mmol/L 142   POTASSIUM mmol/L 3 4*   CHLORIDE mmol/L 108   CO2 mmol/L 22   BUN mg/dL 19   CREATININE mg/dL 1 19   CALCIUM mg/dL 8 3   ALK PHOS U/L 92   ALT U/L 6*   AST U/L 47*           * I Have Reviewed All Lab Data Listed Above  * Additional Pertinent Lab Tests Reviewed: [place] [address] Admission Reviewed        Recent Cultures (last 7 days):       Results from last 7 days  Lab Units 10/27/18  0842 10/27/18  0840   BLOOD CULTURE  No Growth at 48 hrs  No Growth at 48 hrs         Last 24 Hours Medication List:     Current Facility-Administered Medications:  acetaminophen 650 mg Oral Q6H PRN Terrie Lobe, DO    aspirin 81 mg Oral Daily Terrie Lobe, DO    bupivacaine-epinephrine (PF) 20 mL Infiltration Once PRN Shannan Amor MD    cefazolin 1,000 mg Intravenous Q12H Terrie Lobe, DO Last Rate: 1,000 mg (10/30/18 1153)   dextrose 25 mL Intravenous PRN Terrie Lobe, DO    docusate sodium 100 mg Oral BID Terrie Lobe, DO    heparin (porcine) 5,000 Units Subcutaneous [facility] [address],     HYDROmorphone 0 2 mg Intravenous Q3H PRN Terrie Lobe, DO    metoprolol succinate 25 mg Oral Daily Terrie Lobe, DO    OLANZapine 10 mg Oral BID PRN Valery Cantu MD    ondansetron 4 mg Intravenous Q4H PRN Terrie Lobe, DO    oxyCODONE 5 mg Oral Q4H PRN Terrie Lobe, DO         Today, Patient Was Seen By: Delbert Siddiqi DO

## 2018-10-30 NOTE — SPEECH THERAPY NOTE
Speech Language/Pathology    Speech/Language Pathology Progress Note    Patient Name: Sandra Rachel  GDKDV'E Date: 10/30/2018     Problem List  Patient Active Problem List   Diagnosis    Bilateral leg edema    Cellulitis    Hx of cardiac murmur    ALEX (acute kidney injury) ([address] )    Elevated brain natriuretic peptide (BNP) level    Oral thrush    Hypernatremia    Lactic acidosis    Open wound of right hip    Gait instability    Essential hypertension    Hypercholesterolemia    CKD (chronic kidney disease)    Hypoglycemia    Hypoalbuminemia    Acute metabolic encephalopathy    Transaminitis    Abnormal CT scan of head        Past Medical History  Past Medical History:   Diagnosis Date    Arthritis of knee     last assessed 11/7/14, resolved 10/2/17     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 ([address] )     last assessed 9/9/14, resolved 10/2/17     Avascular necrosis of hip ([address] )     last assessed 8/9/16, resolved 10/2/17     Bursitis of hip, right     last assessed 8/12/16, resolved 10/2/17     Cancer ([address] )     skin    Chronic kidney disease, stage 3 ([address] )     Closed intracapsular fracture of femur ([address] )     last assessed 8/9/16, resolved 10/2/17    Fracture of right hip ([address] )     Gout     last assessed 12/24/13, resolved 10/2/17     High cholesterol     Hypertension         Past Surgical History  Past Surgical History:   Procedure Laterality Date    A-V CARDIAC PACEMAKER INSERTION  10/10/2012    CARDIAC PACEMAKER PLACEMENT  2005    CATARACT EXTRACTION EXTRACAPSULAR W/ INTRAOCULAR LENS IMPLANTATION Bilateral     COLON SURGERY      COLOSTOMY  1982    CORONARY ANGIOPLASTY WITH STENT PLACEMENT      stent 1 Gradient, RLE Anteriorogram Poss Plasty Stent     HEMORRHOID SURGERY      HERNIA REPAIR  1986    bilat inguinal    HIP SURGERY Right 04/17/2014    Pin placed in hip due to fracture Dr Pollard    JOINT REPLACEMENT      R hip    POPLITEAL ARTERY ANGIOPLASTY      Femoral - Popliteal     REVISION COLOSTOMY  1983    SKIN CANCER EXCISION      WRIST FRACTURE SURGERY  11/02/2011         Subjective:  Patient is alert and cooperative  He is pleasant for trials in tx session  Oriented  Objective:  Patient reports he had no difficulty with his breakfast  No s/s reported from nursing  Assessment:  Patient is placed upright  He has clear vocal quality, but mild cough prior to any PO intake  The patient is cooperative to use cup sips and straw with Ensure supplement  He is able to hold bottle  Adequate oral stage and no s/s for pharyngeal dysphagia  The patient was cooperative for puree via tsp and diced fruit  He was [facility], and without s/s  Plan/Recommendations:  Continue level 2 mechanical soft diet and thin liquids  This is patient's baseline   Discharge from [address] [place] [address]

## 2018-10-30 NOTE — SOCIAL WORK
Pt likely will need SNF/STR on dc  CM will be following up with pt/family to discuss and once tentative dc date is known authorization with insurance will need to also be obtained for STR

## 2018-10-30 NOTE — PLAN OF CARE
Problem: SLP ADULT - SWALLOWING, IMPAIRED  Goal: Advance to least restrictive diet without signs or symptoms of aspiration for planned discharge setting  See evaluation for individualized goals  Patient will:    1  Tolerate Level 2 MS diet and thin liquids with no S/S of  oral/pharyngeal dysphagia across meals           Outcome: Completed Date Met: 10/30/18

## 2018-10-31 ENCOUNTER — ANESTHESIA EVENT (INPATIENT)
Dept: PERIOP | Facility: HOSPITAL | Age: 83
DRG: 463 | End: 2018-10-31
Payer: COMMERCIAL

## 2018-10-31 PROBLEM — Z96.9 RETAINED ORTHOPEDIC HARDWARE: Status: ACTIVE | Noted: 2018-10-27

## 2018-10-31 LAB
GLUCOSE SERPL-MCNC: 103 MG/DL (ref 65–140)
GLUCOSE SERPL-MCNC: 74 MG/DL (ref 65–140)
GLUCOSE SERPL-MCNC: 91 MG/DL (ref 65–140)

## 2018-10-31 PROCEDURE — 99232 SBSQ HOSP IP/OBS MODERATE 35: CPT | Performed by: PHYSICIAN ASSISTANT

## 2018-10-31 PROCEDURE — 97110 THERAPEUTIC EXERCISES: CPT

## 2018-10-31 PROCEDURE — 82948 REAGENT STRIP/BLOOD GLUCOSE: CPT

## 2018-10-31 PROCEDURE — 97116 GAIT TRAINING THERAPY: CPT

## 2018-10-31 PROCEDURE — 99233 SBSQ HOSP IP/OBS HIGH 50: CPT | Performed by: INTERNAL MEDICINE

## 2018-10-31 PROCEDURE — 97530 THERAPEUTIC ACTIVITIES: CPT

## 2018-10-31 RX ORDER — SODIUM CHLORIDE, SODIUM LACTATE, POTASSIUM CHLORIDE, CALCIUM CHLORIDE 600; 310; 30; 20 MG/100ML; MG/100ML; MG/100ML; MG/100ML
50 INJECTION, SOLUTION INTRAVENOUS CONTINUOUS
Status: DISCONTINUED | OUTPATIENT
Start: 2018-11-01 | End: 2018-11-01

## 2018-10-31 RX ADMIN — ASPIRIN 81 MG 81 MG: 81 TABLET ORAL at 09:52

## 2018-10-31 RX ADMIN — DOCUSATE SODIUM 100 MG: 100 CAPSULE, LIQUID FILLED ORAL at 09:52

## 2018-10-31 RX ADMIN — METOPROLOL SUCCINATE 25 MG: 25 TABLET, EXTENDED RELEASE ORAL at 09:52

## 2018-10-31 RX ADMIN — HEPARIN SODIUM 5000 UNITS: 5000 INJECTION, SOLUTION INTRAVENOUS; SUBCUTANEOUS at 06:09

## 2018-10-31 RX ADMIN — CEFAZOLIN SODIUM 1000 MG: 1 SOLUTION INTRAVENOUS at 00:20

## 2018-10-31 RX ADMIN — DOCUSATE SODIUM 100 MG: 100 CAPSULE, LIQUID FILLED ORAL at 17:29

## 2018-10-31 RX ADMIN — HEPARIN SODIUM 5000 UNITS: 5000 INJECTION, SOLUTION INTRAVENOUS; SUBCUTANEOUS at 13:36

## 2018-10-31 NOTE — PLAN OF CARE
DISCHARGE PLANNING     Discharge to home or other facility with appropriate resources Progressing        DISCHARGE PLANNING - CARE MANAGEMENT     Discharge to post-acute care or home with appropriate resources Progressing        INFECTION - ADULT     Absence or prevention of progression during hospitalization Progressing        Knowledge Deficit     Patient/family/caregiver demonstrates understanding of disease process, treatment plan, medications, and discharge instructions Progressing        METABOLIC, FLUID AND ELECTROLYTES - ADULT     Electrolytes maintained within normal limits Progressing     Fluid balance maintained Progressing        MUSCULOSKELETAL - ADULT     Maintain or return mobility to safest level of function Progressing        Nutrition/Hydration-ADULT     Nutrient/Hydration intake appropriate for improving, restoring or maintaining nutritional needs Progressing        PAIN - ADULT     Verbalizes/displays adequate comfort level or baseline comfort level Progressing        Potential for Falls     Patient will remain free of falls Progressing        Prexisting or High Potential for Compromised Skin Integrity     Skin integrity is maintained or improved Progressing        SAFETY ADULT     Maintain or return to baseline ADL function Progressing     Maintain or return mobility status to optimal level Progressing     Patient will remain free of falls Progressing        SKIN/TISSUE INTEGRITY - ADULT     Skin integrity remains intact Progressing     Incision(s), wounds(s) or drain site(s) healing without S/S of infection Progressing     Oral mucous membranes remain intact Progressing

## 2018-10-31 NOTE — OCCUPATIONAL THERAPY NOTE
OT Note     10/31/18 1029   Restrictions/Precautions   Other Precautions Bed Alarm; Fall Risk;Telemetry   Therapeutic Excerise-Strength   UE Strength Yes   Right Upper Extremity- Strength   R Shoulder Flexion; Extension   R Elbow Elbow flexion;Elbow extension   R Wrist Wrist flexion;Wrist extension   R Hand (Forearm pro/supination)   R Weight/Reps/Sets 2# thera p bar, 2# free wt  3 sets/10   Left Upper Extremity-Strength   L Shoulder Flexion; Extension   L Elbow Elbow flexion;Elbow extension   L Wrist Wrist flexion;Wrist extension   L Hand (Forearm pro/supination)   L Weights/Reps/Sets 2# thera p bar, 2# free wt  3 sets/10   Activity Tolerance   Activity Tolerance Patient tolerated treatment well   Assessment   Assessment Pt presented with multiple deficits affecting overall functional performance on initial assessment  Participates BUE without c/o  Recommend continue active OT services in order to further address OT goals and allow return to highest LOF  Presents in supine, remains same on conclusion tx session awaiting PT

## 2018-10-31 NOTE — PLAN OF CARE
Problem: PHYSICAL THERAPY ADULT  Goal: Performs mobility at highest level of function for planned discharge setting  See evaluation for individualized goals  Treatment/Interventions: Functional transfer training, LE strengthening/ROM, Therapeutic exercise, Gait training, Bed mobility  Equipment Recommended: Walker       See flowsheet documentation for full assessment, interventions and recommendations  Outcome: Progressing  Prognosis: Good  Problem List: Decreased strength, Decreased endurance, Impaired balance, Decreased mobility, Decreased safety awareness  Assessment: Perfoming bed mobility, tx and ambulation  at (min) x 1-2 level of function  Fair stability with  ambulation  Verbal cues for turning and backing up safely to chair  He would benefit from continued PT to help improve strength, ROM, balance, safety, and functional activity tolerance  Recommendation: Short-term skilled PT     PT - OK to Discharge: No    See flowsheet documentation for full assessment

## 2018-10-31 NOTE — PROGRESS NOTES
Progress Note Josette Sorensen 5/13/1921, 80 y o  male MRN: [id number]    Unit/Bed#: 427-01 Encounter: [id number]    Primary Care Provider: Jennifer Leigh DO   Date and time admitted to hospital: 10/27/2018  8:17 AM        * Open wound of right hip   Assessment & Plan    · Cellulitis markedly improved  · History of right hip surgery in 2014 by Dr Vinod Abbott (Orthopedic Surgery)  · Continue IV cefazolin  · Bedside debridement was performed today 10/30/2018  · Continue local wound care  · The patient cannot have an MRI secondary to having an indwelling permanent pacemaker  · A CT scan of the right hip was completed on 10/27/2018 with the following results/impression:  IMPRESSION:     1  Soft tissue swelling overlying the right greater trochanter  Reactive or infectious bursitis is present, though associated bursal calcifications suggest chronic nature  No soft tissue gas      2  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  Retained orthopedic hardware   Assessment & Plan    Patient to OR tomorrow for removal of retained orthopedic hardware     Abnormal CT scan of head   Assessment & Plan    Impression:     1  Limited study due to patient motion  No acute intracranial hemorrhage or transcortical infarction  2   Generalized age-appropriate atrophy, intracranial atherosclerotic disease and mild microangiopathic disease  3   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  4   Numerous nodular scalp soft tissue lesions, recommend direct inspection and clinical correlation         CKD (chronic kidney disease)   Assessment & Plan    · Baseline creatinine 1 0-1 5  · Avoid all nephrotoxic agents  · Serial laboratory testing to monitor his renal function and electrolytes     Essential hypertension   Assessment & Plan    · Continue toprol XL  · Per the patient's son, the patient does not take lisinopril at this time  · Follow the blood pressure trend  · Outpatient follow-up with his PCP in regards to this matter     Gait instability   Assessment & Plan    Likely discharge to home with home care nursing end of week       VTE Pharmacologic Prophylaxis:   Pharmacologic: Heparin  Mechanical VTE Prophylaxis in Place: Yes    Patient Centered Rounds: I have performed bedside rounds with nursing staff today  Current Length of Stay: 4 day(s)    Current Patient Status: Inpatient   Certification Statement: The patient will continue to require additional inpatient hospital stay due to Right hip cellulitis, wound, need for removal of orthopedic hardware due to ongoing severe right hip pain    Discharge Plan / Estimated Discharge Date:  2018      Code Status: Level 1 - Full Code      Subjective:   No pain at rest, pain with ambulation    Objective:     Vitals:   Temp (24hrs), Av 8 °F (36 6 °C), Min:97 4 °F (36 3 °C), Max:98 5 °F (36 9 °C)    Temp:  [97 4 °F (36 3 °C)-98 5 °F (36 9 °C)] 97 5 °F (36 4 °C)  HR:  [75-81] 75  Resp:  [18-20] 18  BP: (140-156)/(74-81) 156/74  SpO2:  [93 %-97 %] 95 %  Body mass index is 21 26 kg/m²  Input and Output Summary (last 24 hours): Intake/Output Summary (Last 24 hours) at 10/31/18 1842  Last data filed at 10/31/18 1030   Gross per 24 hour   Intake             8795 ml   Output              550 ml   Net             8245 ml       Physical Exam:     Physical Exam   Constitutional: He is oriented to person, place, and time  He appears well-developed and well-nourished  No distress  Cardiovascular: Normal rate  Pulmonary/Chest: Effort normal and breath sounds normal  No respiratory distress  He has no wheezes     Musculoskeletal: Normal range of motion  Neurological: He is alert and oriented to person, place, and time  No cranial nerve deficit  Coordination normal    Skin: He is not diaphoretic  Nursing note and vitals reviewed  Additional Data:     Labs:      Results from last 7 days  Lab Units 10/30/18  0605 10/29/18  0541   WBC Thousand/uL 7 20 7 48   HEMOGLOBIN g/dL 13 0 12 8   HEMATOCRIT % 40 5 40 5   PLATELETS Thousands/uL 257 269   NEUTROS PCT %  --  74   LYMPHS PCT %  --  12*   MONOS PCT %  --  11   EOS PCT %  --  2       Results from last 7 days  Lab Units 10/30/18  0605   SODIUM mmol/L 142   POTASSIUM mmol/L 3 4*   CHLORIDE mmol/L 108   CO2 mmol/L 22   BUN mg/dL 19   CREATININE mg/dL 1 19   CALCIUM mg/dL 8 3   ALK PHOS U/L 92   ALT U/L 6*   AST U/L 47*           * I Have Reviewed All Lab Data Listed Above  * Additional Pertinent Lab Tests Reviewed: [place] [address] Admission Reviewed        Recent Cultures (last 7 days):       Results from last 7 days  Lab Units 10/29/18  1242 10/27/18  0842 10/27/18  0840   BLOOD CULTURE   --  No Growth After 4 Days  No Growth After 4 Days     URINE CULTURE  No Growth <1000 cfu/mL  --   --        Last 24 Hours Medication List:     Current Facility-Administered Medications:  acetaminophen 650 mg Oral Q6H PRN Della Infante DO   aspirin 81 mg Oral Daily Della Infante DO   bupivacaine-epinephrine (PF) 20 mL Infiltration Once PRN Jamie Arthur MD   cefazolin 1,000 mg Intravenous Once Luba Quick PA-C   dextrose 25 mL Intravenous PRN Della Infante DO   docusate sodium 100 mg Oral BID Della Infante DO   heparin (porcine) 5,000 Units Subcutaneous [facility] [address] Esequiel Harding DO   HYDROmorphone 0 2 mg Intravenous Q3H PRN Della Infante DO   [START ON 11/1/2018] lactated ringers 50 mL/hr Intravenous Continuous [place] VADIM Lucas   metoprolol succinate 25 mg Oral Daily Esequiel Harding DO   OLANZapine 10 mg Oral BID PRN Nikki Chow MD   ondansetron 4 mg Intravenous Q4H PRN Terrie Santamaria DO   oxyCODONE 5 mg Oral Q4H PRN Terrie Santamaria DO        Today, Patient Was Seen By: Delbert Siddiqi DO

## 2018-10-31 NOTE — SOCIAL WORK
Pt's son:Iban called back and left a message for CM  CM attempted to reach son, left a message  Awaiting a call back

## 2018-10-31 NOTE — SOCIAL WORK
Spoke with son:Iban who prefers his dad to go home with homecare/home PT if he is able  Son prefers to wait  And see how his dad does after the OR tomorrow

## 2018-10-31 NOTE — PHYSICAL THERAPY NOTE
PT Treatment Note      10/31/18 1045   Restrictions/Precautions   Other Precautions (Bed Alarm; Fall Risk;Telemetry)   Subjective   Subjective c/o R hip soreness  Pt  would like to ambulate  Bed Mobility   Supine to Sit 4  Minimal assistance   Additional items Assist x 2;Bedrails; Increased time required;Verbal cues;LE management   Transfers   Sit to Stand 4  Minimal assistance   Additional items Assist x 1;Verbal cues; Bedrails   Stand to Sit 4  Minimal assistance   Additional items Assist x 1; Armrests; Verbal cues; Increased time required   Stand pivot 4  Minimal assistance   Additional items Verbal cues   Ambulation/Elevation   Gait pattern Narrow ISIDORO   Gait Assistance 4  Minimal assist   Additional items Assist x 1;Verbal cues   Assistive Device Rolling walker   Distance 25'   Balance   Static Sitting Fair +   Dynamic Sitting Fair +   Static Standing Fair   Dynamic Standing David [address] [id number] -  (with RW)   Endurance Deficit   Endurance Deficit Yes   Activity Tolerance   Activity Tolerance Patient limited by fatigue   Assessment   Prognosis Good   Problem List Decreased strength;Decreased endurance; Impaired balance;Decreased mobility; Decreased safety awareness   Assessment Perfoming bed mobility, tx and ambulation  at (min) x 1-2 level of function  Fair stability with  ambulation  Verbal cues for turning and backing up safely to chair  He would benefit from continued PT to help improve strength, ROM, balance, safety, and functional activity tolerance  Plan   Treatment/Interventions Functional transfer training;LE strengthening/ROM; Therapeutic exercise; Endurance training;Bed mobility;Gait training   Progress Slow progress, decreased activity tolerance   Recommendation   Recommendation Short-term skilled PT   Pt  OOB in chair   with call bell within reach, scd's connected and turned on and alarm on at end of PT session   Discussed with Asmita Ricks, PT today's treatment and patient's current level of function for care coordination

## 2018-10-31 NOTE — ASSESSMENT & PLAN NOTE
· Cellulitis markedly improved  · History of right hip surgery in 2014 by Dr Luciana Pop (Orthopedic Surgery)  · Continue IV cefazolin  · Bedside debridement was performed today 10/30/2018  · Continue local wound care  · The patient cannot have an MRI secondary to having an indwelling permanent pacemaker  · A CT scan of the right hip was completed on 10/27/2018 with the following results/impression:  IMPRESSION:     1  Soft tissue swelling overlying the right greater trochanter  Reactive or infectious bursitis is present, though associated bursal calcifications suggest chronic nature  No soft tissue gas      2  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

## 2018-10-31 NOTE — SOCIAL WORK
Message left for pt's son:Iban Gamble Susanna [phone] to discuss dc planning for his dad (STR on dc)  Awaiting a call back

## 2018-10-31 NOTE — PLAN OF CARE
Problem: OCCUPATIONAL THERAPY ADULT  Goal: Performs self-care activities at highest level of function for planned discharge setting  See evaluation for individualized goals  Treatment Interventions: ADL retraining, Functional transfer training, UE strengthening/ROM, Endurance training, Patient/family training, Activityengagement          See flowsheet documentation for full assessment, interventions and recommendations  Outcome: Progressing  Limitation: Decreased ADL status, Decreased UE strength, Decreased Safe judgement during ADL, Decreased cognition, Decreased endurance, Decreased self-care trans, Decreased high-level ADLs     Assessment: Pt presented with multiple deficits affecting overall functional performance on initial assessment  Participates BUE without c/o  Recommend continue active OT services in order to further address OT goals and allow return to highest LOF       OT Discharge Recommendation: Short Term Rehab

## 2018-11-01 ENCOUNTER — APPOINTMENT (INPATIENT)
Dept: RADIOLOGY | Facility: HOSPITAL | Age: 83
DRG: 463 | End: 2018-11-01
Payer: COMMERCIAL

## 2018-11-01 ENCOUNTER — ANESTHESIA (INPATIENT)
Dept: PERIOP | Facility: HOSPITAL | Age: 83
DRG: 463 | End: 2018-11-01
Payer: COMMERCIAL

## 2018-11-01 PROBLEM — E43 SEVERE PROTEIN-CALORIE MALNUTRITION (HCC): Status: ACTIVE | Noted: 2018-11-01

## 2018-11-01 LAB
BACTERIA BLD CULT: NORMAL
BACTERIA BLD CULT: NORMAL
GLUCOSE SERPL-MCNC: 104 MG/DL (ref 65–140)
GLUCOSE SERPL-MCNC: 79 MG/DL (ref 65–140)
GLUCOSE SERPL-MCNC: 84 MG/DL (ref 65–140)

## 2018-11-01 PROCEDURE — 99232 SBSQ HOSP IP/OBS MODERATE 35: CPT | Performed by: INTERNAL MEDICINE

## 2018-11-01 PROCEDURE — 73501 X-RAY EXAM HIP UNI 1 VIEW: CPT

## 2018-11-01 PROCEDURE — 0QP604Z REMOVAL OF INTERNAL FIXATION DEVICE FROM RIGHT UPPER FEMUR, OPEN APPROACH: ICD-10-PCS | Performed by: ORTHOPAEDIC SURGERY

## 2018-11-01 PROCEDURE — 87070 CULTURE OTHR SPECIMN AEROBIC: CPT | Performed by: ORTHOPAEDIC SURGERY

## 2018-11-01 PROCEDURE — 0JBL0ZZ EXCISION OF RIGHT UPPER LEG SUBCUTANEOUS TISSUE AND FASCIA, OPEN APPROACH: ICD-10-PCS | Performed by: ORTHOPAEDIC SURGERY

## 2018-11-01 PROCEDURE — 88304 TISSUE EXAM BY PATHOLOGIST: CPT | Performed by: PATHOLOGY

## 2018-11-01 PROCEDURE — 82948 REAGENT STRIP/BLOOD GLUCOSE: CPT

## 2018-11-01 PROCEDURE — C1769 GUIDE WIRE: HCPCS | Performed by: ORTHOPAEDIC SURGERY

## 2018-11-01 PROCEDURE — 87205 SMEAR GRAM STAIN: CPT | Performed by: ORTHOPAEDIC SURGERY

## 2018-11-01 PROCEDURE — 11043 DBRDMT MUSC&/FSCA 1ST 20/<: CPT | Performed by: ORTHOPAEDIC SURGERY

## 2018-11-01 PROCEDURE — 97110 THERAPEUTIC EXERCISES: CPT

## 2018-11-01 PROCEDURE — 20680 REMOVAL OF IMPLANT DEEP: CPT | Performed by: ORTHOPAEDIC SURGERY

## 2018-11-01 PROCEDURE — 87075 CULTR BACTERIA EXCEPT BLOOD: CPT | Performed by: ORTHOPAEDIC SURGERY

## 2018-11-01 RX ORDER — LIDOCAINE HYDROCHLORIDE AND EPINEPHRINE 10; 10 MG/ML; UG/ML
INJECTION, SOLUTION INFILTRATION; PERINEURAL AS NEEDED
Status: DISCONTINUED | OUTPATIENT
Start: 2018-11-01 | End: 2018-11-01 | Stop reason: HOSPADM

## 2018-11-01 RX ORDER — MAGNESIUM HYDROXIDE 1200 MG/15ML
LIQUID ORAL AS NEEDED
Status: DISCONTINUED | OUTPATIENT
Start: 2018-11-01 | End: 2018-11-01 | Stop reason: HOSPADM

## 2018-11-01 RX ORDER — ONDANSETRON 2 MG/ML
4 INJECTION INTRAMUSCULAR; INTRAVENOUS ONCE AS NEEDED
Status: DISCONTINUED | OUTPATIENT
Start: 2018-11-01 | End: 2018-11-01 | Stop reason: HOSPADM

## 2018-11-01 RX ORDER — FENTANYL CITRATE/PF 50 MCG/ML
25 SYRINGE (ML) INJECTION
Status: DISCONTINUED | OUTPATIENT
Start: 2018-11-01 | End: 2018-11-01 | Stop reason: HOSPADM

## 2018-11-01 RX ORDER — ONDANSETRON 2 MG/ML
INJECTION INTRAMUSCULAR; INTRAVENOUS AS NEEDED
Status: DISCONTINUED | OUTPATIENT
Start: 2018-11-01 | End: 2018-11-01 | Stop reason: SURG

## 2018-11-01 RX ORDER — PROPOFOL 10 MG/ML
INJECTION, EMULSION INTRAVENOUS CONTINUOUS PRN
Status: DISCONTINUED | OUTPATIENT
Start: 2018-11-01 | End: 2018-11-01 | Stop reason: SURG

## 2018-11-01 RX ORDER — SODIUM CHLORIDE, SODIUM LACTATE, POTASSIUM CHLORIDE, CALCIUM CHLORIDE 600; 310; 30; 20 MG/100ML; MG/100ML; MG/100ML; MG/100ML
50 INJECTION, SOLUTION INTRAVENOUS CONTINUOUS
Status: ACTIVE | OUTPATIENT
Start: 2018-11-01 | End: 2018-11-01

## 2018-11-01 RX ADMIN — ASPIRIN 81 MG 81 MG: 81 TABLET ORAL at 08:58

## 2018-11-01 RX ADMIN — METOPROLOL SUCCINATE 25 MG: 25 TABLET, EXTENDED RELEASE ORAL at 08:58

## 2018-11-01 RX ADMIN — SODIUM CHLORIDE, SODIUM LACTATE, POTASSIUM CHLORIDE, AND CALCIUM CHLORIDE 50 ML/HR: .6; .31; .03; .02 INJECTION, SOLUTION INTRAVENOUS at 00:24

## 2018-11-01 RX ADMIN — HEPARIN SODIUM 5000 UNITS: 5000 INJECTION, SOLUTION INTRAVENOUS; SUBCUTANEOUS at 21:14

## 2018-11-01 RX ADMIN — DOCUSATE SODIUM 100 MG: 100 CAPSULE, LIQUID FILLED ORAL at 08:58

## 2018-11-01 RX ADMIN — SODIUM CHLORIDE, SODIUM LACTATE, POTASSIUM CHLORIDE, AND CALCIUM CHLORIDE 50 ML/HR: .6; .31; .03; .02 INJECTION, SOLUTION INTRAVENOUS at 13:00

## 2018-11-01 RX ADMIN — PROPOFOL 50 MCG/KG/MIN: 10 INJECTION, EMULSION INTRAVENOUS at 13:38

## 2018-11-01 RX ADMIN — HEPARIN SODIUM 5000 UNITS: 5000 INJECTION, SOLUTION INTRAVENOUS; SUBCUTANEOUS at 05:13

## 2018-11-01 RX ADMIN — DOCUSATE SODIUM 100 MG: 100 CAPSULE, LIQUID FILLED ORAL at 18:02

## 2018-11-01 RX ADMIN — CEFAZOLIN SODIUM 1000 MG: 1 SOLUTION INTRAVENOUS at 13:24

## 2018-11-01 RX ADMIN — ONDANSETRON HYDROCHLORIDE 4 MG: 2 INJECTION, SOLUTION INTRAVENOUS at 14:25

## 2018-11-01 NOTE — PROGRESS NOTES
Progress Note Deland Bloch 5/13/1921, 80 y o  male MRN: [id number]    Unit/Bed#: 427-01 Encounter: [id number]    Primary Care Provider: Juan Alberto Walker DO   Date and time admitted to hospital: 10/27/2018  8:17 AM        * Open wound of right hip   Assessment & Plan    · Cellulitis continues to improve  · History of right hip surgery in 2014 by Dr Jamie Arthur (Orthopedic Surgery)  · Continue IV cefazolin  · Bedside debridement was performed on 10/30/2018  · Continue local wound care  · The patient cannot have an MRI secondary to having an indwelling permanent pacemaker  · A CT scan of the right hip was completed on 10/27/2018 with the following results/impression:  IMPRESSION:     1  Soft tissue swelling overlying the right greater trochanter  Reactive or infectious bursitis is present, though associated bursal calcifications suggest chronic nature  No soft tissue gas      2  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  Retained orthopedic hardware   Assessment & Plan    · Patient to go to OR today 11/1/18 for removal of retained orthopedic hardware     Abnormal CT scan of head   Assessment & Plan    Impression:     1  Limited study due to patient motion  No acute intracranial hemorrhage or transcortical infarction  2   Generalized age-appropriate atrophy, intracranial atherosclerotic disease and mild microangiopathic disease  3   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  4   Numerous nodular scalp soft tissue lesions, recommend direct inspection and clinical correlation         CKD (chronic kidney disease)   Assessment & Plan    · Baseline creatinine 1 0-1 5  · Avoid all nephrotoxic agents  · Serial laboratory testing to monitor his renal function and electrolytes     Essential hypertension   Assessment & Plan    · Continue toprol XL  · Per the patient's son, the patient does not take lisinopril at this time  · Follow the blood pressure trend  · Outpatient follow-up with his PCP in regards to this matter     Gait instability   Assessment & Plan    · PT/OT  · Likely discharge to home with home care nursing end of week     Severe protein-calorie malnutrition ([address] )   Assessment & Plan    Malnutrition Findings:   Malnutrition type: Chronic illness  Degree of Malnutrition: Other severe protein calorie malnutrition    BMI Findings: Body mass index is 21 26 kg/m²  Nutrition consult  Patient is currently NPO for planned OR today  Will resume diet and dietary supplements post operatively as recommended by dietician  VTE Pharmacologic Prophylaxis:   Pharmacologic: Heparin  Mechanical VTE Prophylaxis in Place: Yes    Patient Centered Rounds: I have performed bedside rounds with nursing staff today  Discussions with Specialists or Other Care Team Provider: nursing, CM, and attending    Education and Discussions with Family / Patient: patient    Time Spent for Care: 30 minutes  More than 50% of total time spent on counseling and coordination of care as described above  Current Length of Stay: 5 day(s)    Current Patient Status: Inpatient   Certification Statement: The patient will continue to require additional inpatient hospital stay due to planned OR today for hardware removal     Discharge Plan: Possible discharge home tomorrow 18 with home care  Code Status: Level 1 - Full Code      Subjective: The patient was seen and examined  The patient states he is feeling well  He denies any complaints      Objective:     Vitals:   Temp (24hrs), Av 8 °F (36 6 °C), Min:97 3 °F (36 3 °C), Max:98 7 °F (37 1 °C)    Temp:  [97 3 °F (36 3 °C)-98 7 °F (37 1 °C)] 97 3 °F (36 3 °C)  HR:  [69-75] 69  Resp:  [18-20] 18  BP: (137-156)/(63-74) 148/72  SpO2:  [95 %-97 %] 97 %  Body mass index is 21 26 kg/m²  Input and Output Summary (last 24 hours): Intake/Output Summary (Last 24 hours) at 11/01/18 0945  Last data filed at 11/01/18 0901   Gross per 24 hour   Intake              480 ml   Output              600 ml   Net             -120 ml       Physical Exam:     Physical Exam   Constitutional: He is oriented to person, place, and time  Vital signs are normal  He appears cachectic  He is active and cooperative  Cardiovascular: Normal rate, regular rhythm and normal heart sounds  Pulmonary/Chest: Effort normal and breath sounds normal  He has no wheezes  He has no rhonchi  He has no rales  Abdominal: Soft  Normal appearance and bowel sounds are normal  There is no tenderness  Neurological: He is alert and oriented to person, place, and time  No cranial nerve deficit  Skin:   Right hip wound with mild surrounding erythema  Nursing note and vitals reviewed      Additional Data:     Labs:      Results from last 7 days  Lab Units 10/30/18  0605 10/29/18  0541   WBC Thousand/uL 7 20 7 48   HEMOGLOBIN g/dL 13 0 12 8   HEMATOCRIT % 40 5 40 5   PLATELETS Thousands/uL 257 269   NEUTROS PCT %  --  74   LYMPHS PCT %  --  12*   MONOS PCT %  --  11   EOS PCT %  --  2       Results from last 7 days  Lab Units 10/30/18  0605   SODIUM mmol/L 142   POTASSIUM mmol/L 3 4*   CHLORIDE mmol/L 108   CO2 mmol/L 22   BUN mg/dL 19   CREATININE mg/dL 1 19   ANION GAP mmol/L 12   CALCIUM mg/dL 8 3   ALBUMIN g/dL 2 5*   TOTAL BILIRUBIN mg/dL 0 60   ALK PHOS U/L 92   ALT U/L 6*   AST U/L 47*           Results from last 7 days  Lab Units 11/01/18  0802 10/31/18  1534 10/31/18  1140 10/31/18  0744 10/30/18  1640 10/30/18  1157 10/30/18  0756 10/29/18  2043 10/29/18  1623 10/29/18  1119 10/29/18  0759 10/28/18  2121   POC GLUCOSE mg/dl 84 103 91 74 111 103 83 112 97 102 82 87 Results from last 7 days  Lab Units 10/30/18  0605 10/29/18  0541 10/28/18  0444 10/27/18  1436 10/27/18  0842   LACTIC ACID mmol/L  --   --   --  0 8 2 5*   PROCALCITONIN ng/ml 0 06 <0 05 <0 05 <0 05  --            * I Have Reviewed All Lab Data Listed Above  * Additional Pertinent Lab Tests Reviewed: All Labs Within Last 24 Hours Reviewed    Imaging:    Imaging Reports Reviewed Today Include: none  Imaging Personally Reviewed by Myself Includes:  none    Recent Cultures (last 7 days):       Results from last 7 days  Lab Units 10/29/18  1242 10/27/18  0842 10/27/18  0840   BLOOD CULTURE   --  No Growth After 4 Days  No Growth After 4 Days  URINE CULTURE  No Growth <1000 cfu/mL  --   --        Last 24 Hours Medication List:     Current Facility-Administered Medications:  acetaminophen 650 mg Oral Q6H PRN [facility], DO    aspirin 81 mg Oral Daily [facility], DO    bupivacaine-epinephrine (PF) 20 mL Infiltration Once PRN Nohemi Coker MD    cefazolin 1,000 mg Intravenous Once Randye Carrel, PA-C    dextrose 25 mL Intravenous PRN [facility], DO    docusate sodium 100 mg Oral BID [facility], DO    heparin (porcine) 5,000 Units Subcutaneous Q8H [address] Esequiel Harding DO    HYDROmorphone 0 2 mg Intravenous Q3H PRN [facility], DO    lactated ringers 50 mL/hr Intravenous Continuous Aquiles Bryant CRNA Last Rate: 50 mL/hr (11/01/18 0024)   metoprolol succinate 25 mg Oral Daily Esequiel Harding DO    OLANZapine 10 mg Oral BID PRN Claudetta Fear, MD    ondansetron 4 mg Intravenous Q4H PRN [facility], DO    oxyCODONE 5 mg Oral Q4H PRN [facility], DO         Today, Patient Was Seen By: Claritza Fuentes PA-C    ** Please Note: Dictation voice to text software may have been used in the creation of this document   **

## 2018-11-01 NOTE — PLAN OF CARE
Problem: PHYSICAL THERAPY ADULT  Goal: Performs mobility at highest level of function for planned discharge setting  See evaluation for individualized goals  Treatment/Interventions: Functional transfer training, LE strengthening/ROM, Therapeutic exercise, Gait training, Bed mobility  Equipment Recommended: Walker       See flowsheet documentation for full assessment, interventions and recommendations  Outcome: Progressing  Prognosis: Good  Problem List: Decreased strength, Decreased endurance, Impaired balance, Decreased mobility  Assessment: Therapeutic exercises as above were performed as tolerated  Minimal TE Rhip 2* pain  He would benefit from PT to help improve strength, ROM, balance, safety, and functional activity tolerance  Recommendation: Short-term skilled PT     PT - OK to Discharge: No    See flowsheet documentation for full assessment

## 2018-11-01 NOTE — UTILIZATION REVIEW
Thank you,  [address]  Utilization Review Department  Phone: [phone]; Fax [phone]  ATTENTION: Please call with any questions or concerns to [phone]  and carefully follow the prompts so that you are directed to the right person  Send all requests for admission clinical reviews, approved or denied determinations and any other requests to fax [phone]   All voicemails are confidential    Continued Stay Review    Date: 11/1/18    Vital Signs: /72 (BP Location: Left arm)   Pulse 69   Temp (!) 97 3 °F (36 3 °C) (Temporal)   Resp 18   Ht 5' 10" (1 778 m)   Wt 67 2 kg (148 lb 2 4 oz)   SpO2 97%   BMI 21 26 kg/m²     Medications:   Scheduled Meds:   Current Facility-Administered Medications:  acetaminophen 650 mg Oral Q6H PRN Vearl Koosharem, DO    aspirin 81 mg Oral Daily Vearl Koosharem, DO    bupivacaine-epinephrine (PF) 20 mL Infiltration Once PRN Vj Rogel MD    cefazolin 1,000 mg Intravenous Once Marli Dougherty PA-C    dextrose 25 mL Intravenous PRN Vearl Koosharem, DO    docusate sodium 100 mg Oral BID Vearl Koosharem, DO    heparin (porcine) 5,000 Units Subcutaneous Q8H [address] Esequiel Harding,     HYDROmorphone 0 2 mg Intravenous Q3H PRN Vearl Koosharem, DO    lactated ringers 50 mL/hr Intravenous Continuous Ellie Estevez CRNA Last Rate: 50 mL/hr (11/01/18 0024)   metoprolol succinate 25 mg Oral Daily Esequiel Harding,     OLANZapine 10 mg Oral BID PRN Wilbert Rushing MD    ondansetron 4 mg Intravenous Q4H PRN Vearl Koosharem, DO    oxyCODONE 5 mg Oral Q4H PRN Vearl Koosharem, DO      Continuous Infusions:   lactated ringers 50 mL/hr Last Rate: 50 mL/hr (11/01/18 0024)     PRN Meds:   acetaminophen    bupivacaine-epinephrine (PF)    dextrose    HYDROmorphone    OLANZapine    ondansetron    oxyCODONE    Abnormal Labs/Diagnostic Results:     Age/Sex: 80 y o  male     Assessment/Plan: 79 YO MALE WITH OPEN WOUND R HIP, PERSISTENT CELLULITIS  CONTINUE IV ANCEF, BEDSIDE DEBRIDEMENT ON 10/30, CONTINUE LOCAL WOUND CARE, CT R HIP FROM 10/27 SHOWED INFECTIOUS OR REACTIVE BURSITIS AND NECROSIS R PROXIMAL FEMUR  GOING TO OR 11/1/18 FOR REMOVAL OF RETAINED ORTHOPEDIC HARDWARE        Current Patient Status: Inpatient   Certification Statement: The patient will continue to require additional inpatient hospital stay due to planned OR today for hardware removal     Discharge Plan: TO BE DETERMINED

## 2018-11-01 NOTE — DISCHARGE INSTRUCTIONS
Contact Dr Tania Irene  for office visit in 10 days  Gail Valentine will be removed after November 12    Wound care in care consult for right decubitus ulcer with daily dressing    Patient may need referral to Plastic surgery for decubitus ulcer right hip this can be done as an outpatient

## 2018-11-01 NOTE — OP NOTE
OPERATIVE REPORT  PATIENT NAME: Cipriano Cogan    :  1921  MRN: [id number]  Pt Location: [facility] OR ROOM 02    SURGERY DATE: 2018    Surgeon(s) and Role:     * Jonel García MD - Primary     * Dawna Ahumada PA-C - Assisting  no qualified resident available    Preop Diagnosis:  Retained orthopedic hardware [Z96 9], painful with collapsed femoral head and screws cutting into the acetabulum  Decubitus ulcer right posterior superior hip    Post-Op Diagnosis Codes:     * Retained orthopedic hardware [Z96 9], painful hardware  Debridement of decubitus ulcer    Procedure(s) (LRB):  Removal of cannulated screws (Right)    Specimen(s):  ID Type Source Tests Collected by Time Destination   1 : right hip Tissue Wound TISSUE EXAM Jonel García MD 2018 1429    A : right hip Wound Wound ANAEROBIC CULTURE AND GRAM STAIN, WOUND CULTURE Jonel García MD 2018 1427        Estimated Blood Loss:   50 mL    Drains:       Anesthesia Type:   Choice    Operative Indications:  Retained orthopedic hardware [Z96 9]  Decubitus ulcer right hip posterior superior aspect    Operative Findings: Three screws which had backed out removed without difficulty  No evidence of infection  Radiographs confirm screw removal and collapsed femoral head  Wound completely sealed with for the decubitus ulcer tackled    Decubitus ulcer 3 x 2 cm  Debrided skin subcutaneous tissue and fascia  Depth of almost 2 cm by the  time the debridement was completed    Complications:   None    Procedure and Technique: All treatment options were discussed with the patient including non-operative and operative treatment options  Patient has failed non-perative treatment and has opted for surgical intervention  Risks, complications and benefits of all treatment options were discussed in detail  The risks of surgical intervention including infection, injury to vessels and nerves  risk of failure to achieve desired results, risk of need for further procedures, potential risk of loss of life and limb were discussed with the patient  Informed consent was obtained from the patient  The operative site was marked and signed  A timeout was performed prior to the procedure  The patient was re-identified ,including name, date of birth, procedure, consent form reviewed, site and laterality  Appropriate antibiotics were administered preoperatively    The Physician Assistant was present for the entire case and provided essential assistance with patient positioning, prep and draping, wound closure, sterile dressing and splint application, all under my direct supervision(there was no resident available to assist with this case)    Right hip was prepared and draped in sterile fashion after the decubitus ulcer sealed out of the operative area  Hip screws had backed out were localized fluoroscopy  3 cm incision was made  Dissection was carried out with the Bovie till the screws were identified  All 3 screws removed without difficulty  No evidence of infection in the hip  Check radiographs showed  Hardware removed and collapsed femoral head  The screws had perforated the femoral head and caused a cystic change in the acetabulum  Labelled  closure was performed staples to skin the wound was completely sealed off with Dermabond and dressings    Decubitus ulcers then exposed  It was measuring 3 x 2 cm  This was debrided 1st skin then subcutaneous tissue of the fascia  Final debridement area of 3 x 2 x 2 cm  Cultures were obtained  Excised tissue was sent to pathology  Was irrigated and it was dressed         I was present for the entire procedure    Patient Disposition:  PACU     SIGNATURE: Ricardo Benitez MD  DATE: November 1, 2018  TIME: 2:37 PM

## 2018-11-01 NOTE — ASSESSMENT & PLAN NOTE
· Cellulitis continues to improve  · History of right hip surgery in 2014 by Dr Mario Chen (Orthopedic Surgery)  · Continue IV cefazolin  · Bedside debridement was performed on 10/30/2018  · Continue local wound care  · The patient cannot have an MRI secondary to having an indwelling permanent pacemaker  · A CT scan of the right hip was completed on 10/27/2018 with the following results/impression:  IMPRESSION:     1  Soft tissue swelling overlying the right greater trochanter  Reactive or infectious bursitis is present, though associated bursal calcifications suggest chronic nature  No soft tissue gas      2  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

## 2018-11-01 NOTE — ANESTHESIA POSTPROCEDURE EVALUATION
Post-Op Assessment Note      CV Status:  Stable    Mental Status:  Somnolent    Hydration Status:  Stable    PONV Controlled:  None    Airway Patency:  Patent    Post Op Vitals Reviewed: Yes          Staff: CRNA           BP   132/63   Temp  97 0   Pulse  74   Resp   16   SpO2   97

## 2018-11-01 NOTE — ANESTHESIA PREPROCEDURE EVALUATION
Review of Systems/Medical History          Cardiovascular  Hyperlipidemia, Hypertension ,    Pulmonary       GI/Hepatic       Kidney disease , Chronic kidney disease ,        Endo/Other     GYN       Hematology   Musculoskeletal    Arthritis     Neurology   Psychology           Physical Exam    Airway    Mallampati score: II         Dental   No notable dental hx     Cardiovascular      Pulmonary      Other Findings        Anesthesia Plan  ASA Score- 3     Anesthesia Type- spinal with ASA Monitors  Additional Monitors:   Airway Plan:     Comment: I, Dr Abigail Mcdermott, the attending physician, have personally seen and evaluated the patient prior to anesthetic care  I have reviewed the pre-anesthetic record, and other medical records if appropriate to the anesthetic care  If a CRNA is involved in the case, I have reviewed the CRNA assessment, if present, and agree  The patient is in a suitable condition to proceed with my formulated anesthetic plan        Plan Factors-    Induction-     Postoperative Plan-     Informed Consent- Anesthetic plan and risks discussed with patient  I personally reviewed this patient with the CRNA  Discussed and agreed on the Anesthesia Plan with the CRNA  Abhay Dowling

## 2018-11-01 NOTE — PHYSICAL THERAPY NOTE
PT Treatment Note      11/01/18 1119   Restrictions/Precautions   Other Precautions (Chair Alarm; Bed Alarm;Multiple lines;Telemetry; Fall Risk)   Subjective   Subjective reports he is very tired  Refused ambulation, agreeable to therapeutic exercises  Bed Mobility   Additional Comments deferred per pt  Endurance Deficit   Endurance Deficit Yes   Activity Tolerance   Activity Tolerance Patient limited by fatigue;Patient limited by pain   Exercises   Heelslides Supine;15 reps;AAROM;AROM   Glute Sets 15 reps; Supine   Hip Flexion Supine;15 reps;AAROM   Hip Abduction Supine;15 reps;AAROM   Hip Adduction Supine;15 reps;AAROM   Ankle Pumps Supine;20 reps;AROM   Assessment   Prognosis Good   Problem List Decreased strength;Decreased endurance; Impaired balance;Decreased mobility   Assessment Therapeutic exercises as above were performed as tolerated  Minimal TE Rhip 2* pain  He would benefit from PT to help improve strength, ROM, balance, safety, and functional activity tolerance  Plan   Treatment/Interventions Functional transfer training;LE strengthening/ROM; Therapeutic exercise; Endurance training;Bed mobility;Gait training   Progress Slow progress, decreased activity tolerance   Recommendation   Recommendation Short-term skilled PT   Pt  In bed  with call bell within reach and alarm on at end of PT session  Discussed with Deborah Evans, PT today's treatment and patient's current level of function for care coordination

## 2018-11-01 NOTE — ASSESSMENT & PLAN NOTE
Malnutrition Findings:   Malnutrition type: Chronic illness  Degree of Malnutrition: Other severe protein calorie malnutrition    BMI Findings: Body mass index is 21 26 kg/m²  Nutrition consult  Patient is currently NPO for planned OR today  Will resume diet and dietary supplements post operatively as recommended by dietician

## 2018-11-02 LAB
ANION GAP SERPL CALCULATED.3IONS-SCNC: 8 MMOL/L (ref 4–13)
BASOPHILS # BLD AUTO: 0.04 THOUSANDS/ΜL (ref 0–0.1)
BASOPHILS NFR BLD AUTO: 1 % (ref 0–1)
BUN SERPL-MCNC: 26 MG/DL (ref 5–25)
CALCIUM SERPL-MCNC: 8.5 MG/DL (ref 8.3–10.1)
CHLORIDE SERPL-SCNC: 109 MMOL/L (ref 100–108)
CO2 SERPL-SCNC: 27 MMOL/L (ref 21–32)
CREAT SERPL-MCNC: 1.09 MG/DL (ref 0.6–1.3)
EOSINOPHIL # BLD AUTO: 0.17 THOUSAND/ΜL (ref 0–0.61)
EOSINOPHIL NFR BLD AUTO: 3 % (ref 0–6)
ERYTHROCYTE [DISTWIDTH] IN BLOOD BY AUTOMATED COUNT: 14.2 % (ref 11.6–15.1)
GFR SERPL CREATININE-BSD FRML MDRD: 57 ML/MIN/1.73SQ M
GLUCOSE SERPL-MCNC: 142 MG/DL (ref 65–140)
GLUCOSE SERPL-MCNC: 153 MG/DL (ref 65–140)
GLUCOSE SERPL-MCNC: 93 MG/DL (ref 65–140)
GLUCOSE SERPL-MCNC: 99 MG/DL (ref 65–140)
HCT VFR BLD AUTO: 37.8 % (ref 36.5–49.3)
HGB BLD-MCNC: 11.8 G/DL (ref 12–17)
IMM GRANULOCYTES # BLD AUTO: 0.02 THOUSAND/UL (ref 0–0.2)
IMM GRANULOCYTES NFR BLD AUTO: 0 % (ref 0–2)
LYMPHOCYTES # BLD AUTO: 0.83 THOUSANDS/ΜL (ref 0.6–4.47)
LYMPHOCYTES NFR BLD AUTO: 14 % (ref 14–44)
MAGNESIUM SERPL-MCNC: 1.7 MG/DL (ref 1.6–2.6)
MCH RBC QN AUTO: 29.3 PG (ref 26.8–34.3)
MCHC RBC AUTO-ENTMCNC: 31.2 G/DL (ref 31.4–37.4)
MCV RBC AUTO: 94 FL (ref 82–98)
MONOCYTES # BLD AUTO: 0.85 THOUSAND/ΜL (ref 0.17–1.22)
MONOCYTES NFR BLD AUTO: 14 % (ref 4–12)
NEUTROPHILS # BLD AUTO: 4.04 THOUSANDS/ΜL (ref 1.85–7.62)
NEUTS SEG NFR BLD AUTO: 68 % (ref 43–75)
NRBC BLD AUTO-RTO: 0 /100 WBCS
PLATELET # BLD AUTO: 231 THOUSANDS/UL (ref 149–390)
PMV BLD AUTO: 12 FL (ref 8.9–12.7)
POTASSIUM SERPL-SCNC: 3.9 MMOL/L (ref 3.5–5.3)
RBC # BLD AUTO: 4.03 MILLION/UL (ref 3.88–5.62)
SODIUM SERPL-SCNC: 144 MMOL/L (ref 136–145)
WBC # BLD AUTO: 5.95 THOUSAND/UL (ref 4.31–10.16)

## 2018-11-02 PROCEDURE — 97530 THERAPEUTIC ACTIVITIES: CPT

## 2018-11-02 PROCEDURE — 85025 COMPLETE CBC W/AUTO DIFF WBC: CPT | Performed by: PHYSICIAN ASSISTANT

## 2018-11-02 PROCEDURE — G8978 MOBILITY CURRENT STATUS: HCPCS | Performed by: PHYSICAL THERAPIST

## 2018-11-02 PROCEDURE — G8987 SELF CARE CURRENT STATUS: HCPCS

## 2018-11-02 PROCEDURE — 99024 POSTOP FOLLOW-UP VISIT: CPT | Performed by: ORTHOPAEDIC SURGERY

## 2018-11-02 PROCEDURE — 99232 SBSQ HOSP IP/OBS MODERATE 35: CPT | Performed by: INTERNAL MEDICINE

## 2018-11-02 PROCEDURE — G8979 MOBILITY GOAL STATUS: HCPCS | Performed by: PHYSICAL THERAPIST

## 2018-11-02 PROCEDURE — G8988 SELF CARE GOAL STATUS: HCPCS

## 2018-11-02 PROCEDURE — 97116 GAIT TRAINING THERAPY: CPT | Performed by: PHYSICAL THERAPIST

## 2018-11-02 PROCEDURE — 97164 PT RE-EVAL EST PLAN CARE: CPT | Performed by: PHYSICAL THERAPIST

## 2018-11-02 PROCEDURE — 83735 ASSAY OF MAGNESIUM: CPT | Performed by: PHYSICIAN ASSISTANT

## 2018-11-02 PROCEDURE — 82948 REAGENT STRIP/BLOOD GLUCOSE: CPT

## 2018-11-02 PROCEDURE — 97168 OT RE-EVAL EST PLAN CARE: CPT

## 2018-11-02 PROCEDURE — 80048 BASIC METABOLIC PNL TOTAL CA: CPT | Performed by: PHYSICIAN ASSISTANT

## 2018-11-02 RX ADMIN — HYDROMORPHONE HYDROCHLORIDE 0.2 MG: 1 INJECTION, SOLUTION INTRAMUSCULAR; INTRAVENOUS; SUBCUTANEOUS at 03:10

## 2018-11-02 RX ADMIN — ACETAMINOPHEN 650 MG: 325 TABLET, FILM COATED ORAL at 18:20

## 2018-11-02 RX ADMIN — DOCUSATE SODIUM 100 MG: 100 CAPSULE, LIQUID FILLED ORAL at 18:19

## 2018-11-02 RX ADMIN — DOCUSATE SODIUM 100 MG: 100 CAPSULE, LIQUID FILLED ORAL at 09:25

## 2018-11-02 RX ADMIN — OXYCODONE HYDROCHLORIDE 5 MG: 5 TABLET ORAL at 15:40

## 2018-11-02 RX ADMIN — HEPARIN SODIUM 5000 UNITS: 5000 INJECTION, SOLUTION INTRAVENOUS; SUBCUTANEOUS at 05:26

## 2018-11-02 RX ADMIN — METOPROLOL SUCCINATE 25 MG: 25 TABLET, EXTENDED RELEASE ORAL at 09:25

## 2018-11-02 RX ADMIN — HEPARIN SODIUM 5000 UNITS: 5000 INJECTION, SOLUTION INTRAVENOUS; SUBCUTANEOUS at 21:23

## 2018-11-02 RX ADMIN — HEPARIN SODIUM 5000 UNITS: 5000 INJECTION, SOLUTION INTRAVENOUS; SUBCUTANEOUS at 13:33

## 2018-11-02 RX ADMIN — ASPIRIN 81 MG 81 MG: 81 TABLET ORAL at 09:25

## 2018-11-02 NOTE — PROGRESS NOTES
Progress Note - Orthopedics   Gage Dose 80 y o  male MRN: [id number]  Unit/Bed#: 427-01 Encounter: [id number]  11/02/18  1:02 PM            Subjective:  Patient comfortable in bed  Postop day 1 status post hip screw removal and debridement of decubitus ulcer right hip    Vitals: Blood pressure 145/68, pulse 74, temperature 98 3 °F (36 8 °C), temperature source Temporal, resp  rate 16, height 5' 10" (1 778 m), weight 67 2 kg (148 lb 2 4 oz), SpO2 98 %  ,Body mass index is 21 26 kg/m²        Intake/Output Summary (Last 24 hours) at 11/02/18 1302  Last data filed at 11/02/18 0900   Gross per 24 hour   Intake             2283 ml   Output              250 ml   Net             2033 ml       Invasive Devices     Peripheral Intravenous Line            Peripheral IV 11/01/18 Right Antecubital less than 1 day              Current Facility-Administered Medications   Medication Dose Route Frequency Provider Last Rate Last Dose    acetaminophen (TYLENOL) tablet 650 mg  650 mg Oral Q6H PRN Ana Cola, DO        aspirin chewable tablet 81 mg  81 mg Oral Daily Ana Cola, DO   81 mg at 11/02/18 0925    bupivacaine-epinephrine (PF) (MARCAINE/EPINEPHRINE PF) 0 25 %-1:[id number] injection 20 mL  20 mL Infiltration Once PRN Emelyn Onofre MD        dextrose 50 % IV solution 25 mL  25 mL Intravenous PRN Ana Cola, DO        docusate sodium (COLACE) capsule 100 mg  100 mg Oral BID Ana Cola, DO   100 mg at 11/02/18 0925    heparin (porcine) subcutaneous injection 5,000 Units  5,000 Units Subcutaneous [facility] Ana Cola, DO   5,000 Units at 11/02/18 0526    HYDROmorphone (DILAUDID) injection 0 2 mg  0 2 mg Intravenous Q3H PRN Ana Cola, DO   0 2 mg at 11/02/18 0310    metoprolol succinate (TOPROL-XL) 24 hr tablet 25 mg  25 mg Oral Daily Ana Cola, DO   25 mg at 11/02/18 0925    OLANZapine (ZyPREXA ZYDIS) dispersible tablet 10 mg  10 mg Oral BID PRN MD Chantal Corbin ondansetron (ZOFRAN) injection 4 mg  4 mg Intravenous Q4H PRN Honey Cheeks, DO        oxyCODONE (ROXICODONE) IR tablet 5 mg  5 mg Oral Q4H PRN Honey Cheeks, DO         Chief Complaint   Patient presents with    Wound Check     Patient has pressure ulcer on right hip, complains of pain at wound site     Patient Active Problem List   Diagnosis    Bilateral leg edema    Cellulitis    Hx of cardiac murmur    ALEX (acute kidney injury) ([address] )    Elevated brain natriuretic peptide (BNP) level    Oral thrush    Open wound of right hip    Gait instability    Essential hypertension    Hypercholesterolemia    CKD (chronic kidney disease)    Hypoalbuminemia    Transaminitis    Abnormal CT scan of head    Retained orthopedic hardware    Severe protein-calorie malnutrition (HCC)       Physical Exam: /68 (BP Location: Left arm)   Pulse 74   Temp 98 3 °F (36 8 °C) (Temporal)   Resp 16   Ht 5' 10" (1 778 m)   Wt 67 2 kg (148 lb 2 4 oz)   SpO2 98%   BMI 21 26 kg/m²     General Appearance:    Alert, cooperative, no distress, appears stated age   Head:     Eyes:     Ears:     Nose:    Throat:    Neck:    Back:      Lungs:      Chest wall:     Heart:     Abdomen:      Genitalia:     Rectal:     Extremities:    Pulses:    Skin:    Lymph nodes:    Neurologic:      Ortho Exam: Hip  right hip dressings intact not disturbed    Lab, Imaging and other studies: I have personally reviewed pertinent lab results                Assessment:  Stable status post right hip implant removal and debridement decubitus ulcer    Plan:  Daily dressings for the right decubitus wound  Patient allowed to weightbear as tolerated  Follow-up with Dr Reed Magdaleno in 10 days      Adebayo Owens MD

## 2018-11-02 NOTE — PLAN OF CARE
Problem: OCCUPATIONAL THERAPY ADULT  Goal: Performs self-care activities at highest level of function for planned discharge setting  See evaluation for individualized goals  Treatment Interventions: ADL retraining, Functional transfer training, UE strengthening/ROM, Endurance training, Patient/family training, Activityengagement          See flowsheet documentation for full assessment, interventions and recommendations  Outcome: Progressing  Limitation: Decreased ADL status, Decreased Safe judgement during ADL, Decreased UE strength, Decreased endurance, Decreased self-care trans, Decreased high-level ADLs     Assessment: Pt is a 80 y o  male seen for OT evaluation s/p admit to [facility] on 10/27/2018 w/ Open wound of right hip  Comorbidities affecting pt's functional performance at time of assessment include: HTN, limited hearing, previous surgery and dementia  Personal factors affecting pt at time of IE include:steps to enter environment, difficulty performing ADLS, difficulty performing IADLS , limited insight into deficits, decreased initiation and engagement , health management  and environment  Prior to admission, pt was (A) with ADL/IADL performance with use of RW during functional mobility  Upon evaluation: Pt requires min-max (A) x1-2 with use of RW during functional mobility 2* the following deficits impacting occupational performance: weakness, decreased strength, decreased balance, decreased tolerance, impaired initiation, impaired memory, impaired sequencing, impaired problem solving, decreased safety awareness, increased pain and orthopedic restrictions  Pt to benefit from continued skilled OT tx while in the hospital to address deficits as defined above and maximize level of functional independence w ADL's and functional mobility  Occupational Performance areas to address include: grooming, bathing/shower, toilet hygiene, dressing, functional mobility, community mobility and clothing management   From OT standpoint, recommendation at time of d/c would be short term rehab       OT Discharge Recommendation: Short Term Rehab

## 2018-11-02 NOTE — ANESTHESIA PROCEDURE NOTES
Spinal Block    Patient location during procedure: OR  Start time: 11/1/2018 1:33 PM  End time: 11/1/2018 1:37 PM  Reason for block: at surgeon's request and primary anesthetic  Staffing  Anesthesiologist: Bernardino Becker  Performed: anesthesiologist   Preanesthetic Checklist  Completed: patient identified, site marked, surgical consent, pre-op evaluation, timeout performed, IV checked, risks and benefits discussed and monitors and equipment checked  Spinal Block  Patient position: sitting  Prep: Betadine  Patient monitoring: heart rate, cardiac monitor, continuous pulse ox and frequent blood pressure checks  Approach: midline  Location: L3-4  Injection technique: single-shot  Needle  Needle type: pencil-tip   Needle gauge: 25 G  Needle length: 10 cm  Assessment  Sensory level: T10  Injection Assessment:  negative aspiration for heme, no paresthesia on injection and positive aspiration for clear CSF    Post-procedure:  site cleaned

## 2018-11-02 NOTE — PHYSICAL THERAPY NOTE
PT Treatment note      11/02/18 1430   Restrictions/Precautions   Weight Bearing Precautions Per Order Yes   RLE Weight Bearing Per Order WBAT   Other Precautions (Chair Alarm; Bed Alarm;Telemetry; Fall Risk;Pain)   Subjective   Subjective Would like to return to bed  Bed Mobility   Sit to Supine 3  Moderate assistance   Additional items Assist x 2;Verbal cues   Transfers   Sit to Stand 4  Minimal assistance   Additional items Assist x 2;Armrests; Verbal cues   Stand to Sit 4  Minimal assistance   Additional items Assist x 2   Stand pivot 4  Minimal assistance   Additional items Assist x 2;Verbal cues   Ambulation/Elevation   Gait pattern (Step to;Short stride;Decreased foot clearance;Decreased R st)   Gait Assistance 4  Minimal assist   Additional items Assist x 1   Assistive Device Rolling walker   Distance 3' chair to ed   Balance   Static Sitting Fair +   Dynamic Sitting Fair +   Static Standing [address] -  (with RW)   Endurance Deficit   Endurance Deficit Yes   Activity Tolerance   Activity Tolerance Patient limited by fatigue;Patient limited by pain   Assessment   Prognosis Good   Problem List Decreased strength;Decreased endurance;Decreased range of motion; Impaired balance;Decreased mobility; Decreased safety awareness;Orthopedic restrictions   Assessment Pt performing bed mobility, tx/ambulation at (min-mod) x 2 level of function  Limited by pain and weakness  Pt with decreased foot clearance hip pain and weakness during ambulation and will require short term rehab on discharge  Pt is in need of continued activity in PT to improve pain ROM strength balance endurance mobility transfers and ambulation  Plan   Treatment/Interventions Functional transfer training;LE strengthening/ROM; Therapeutic exercise; Endurance training;Bed mobility;Gait training   Progress Progressing toward goals   Recommendation   Recommendation Short-term skilled PT   Pt   In bed  with call bell within reach with PCA for bathing  at end of PT session  Discussed with Melissa Restrepo, PT today's treatment and patient's current level of function for care coordination

## 2018-11-02 NOTE — ASSESSMENT & PLAN NOTE
· Cellulitis continues to improve, discontinue antibiotics    · History of right hip surgery in 2014 by Dr Radha Huynh (Orthopedic Surgery)  · Bedside debridement was performed on 10/30/2018  · Continue local wound care

## 2018-11-02 NOTE — OCCUPATIONAL THERAPY NOTE
Occupational Therapy Evaluation     Patient Name: Gage Victor  YSNHG'D Date: 11/2/2018  Problem List  Patient Active Problem List   Diagnosis    Bilateral leg edema    Cellulitis    Hx of cardiac murmur    ALEX (acute kidney injury) ([address]    Elevated brain natriuretic peptide (BNP) level    Oral thrush    Open wound of right hip    Gait instability    Essential hypertension    Hypercholesterolemia    CKD (chronic kidney disease)    Hypoalbuminemia    Transaminitis    Abnormal CT scan of head    Retained orthopedic hardware    Severe protein-calorie malnutrition ([address] )     Past Medical History  Past Medical History:   Diagnosis Date    Arthritis of knee     last assessed 11/7/14, resolved 10/2/17     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 ([address] )     last assessed 9/9/14, resolved 10/2/17     Avascular necrosis of hip ([address] )     last assessed 8/9/16, resolved 10/2/17     Bursitis of hip, right     last assessed 8/12/16, resolved 10/2/17     Cancer ([address] )     skin    Chronic kidney disease, stage 3 ([address] )     Closed intracapsular fracture of femur ([address] )     last assessed 8/9/16, resolved 10/2/17    Fracture of right hip ([address] )     Gout     last assessed 12/24/13, resolved 10/2/17     High cholesterol     Hypertension      Past Surgical History  Past Surgical History:   Procedure Laterality Date    A-V CARDIAC PACEMAKER INSERTION  10/10/2012    CARDIAC PACEMAKER PLACEMENT  2005    CATARACT EXTRACTION EXTRACAPSULAR W/ INTRAOCULAR LENS IMPLANTATION Bilateral     COLON SURGERY      COLOSTOMY  1982    CORONARY ANGIOPLASTY WITH STENT PLACEMENT      stent 1 Gradient, RLE Anteriorogram Poss Plasty Stent     HEMORRHOID SURGERY      HERNIA REPAIR  1986    bilat inguinal    HIP SURGERY Right 04/17/2014    Pin placed in hip due to fracture Dr Pollard    JOINT REPLACEMENT      R hip    POPLITEAL ARTERY ANGIOPLASTY      Femoral - Popliteal     SC REMOVAL OF HIP PROSTHESIS Right 11/1/2018    Procedure: Removal of cannulated screws;  Surgeon: Vj Rogel MD;  Location: [facility];  Service: Orthopedics    [address]    SKIN CANCER EXCISION      WRIST FRACTURE SURGERY  11/02/2011 11/02/18 0836   Note Type   Note type Re-eval   Restrictions/Precautions   Weight Bearing Precautions Per Order No   Other Precautions Chair Alarm; Bed Alarm; Fall Risk;O2;Pain;Multiple lines;Telemetry;Aspiration   Pain Assessment   Pain Assessment 0-10   Pain Score Worst Possible Pain   Pain Type Surgical pain   Pain Location Hip   Pain Orientation Right   Home Living   Additional Comments see initial evaluation for details    Prior Function   Comments see initial evaluation for details    Psychosocial   Psychosocial (WDL) X   Patient Behaviors/Mood Anxious   Subjective   Subjective "can ya come back in a couple hours"   ADL   Where Assessed Edge of bed   LB Dressing Assistance 2  Maximal Assistance   LB Dressing Deficit Don/doff R shoe;Don/doff L shoe   Additional Comments pt unable to complete LB dressing this date due to increased pain   Bed Mobility   Supine to Sit 3  Moderate assistance   Additional items Assist x 2; Increased time required;Verbal cues;LE management; Bedrails   Sit to Supine (seated in chair at end of session)   Additional Comments pt on 2 5L O2 thorughout session with minimal report of SOB   Transfers   Sit to Stand 4  Minimal assistance   Additional items Assist x 2;Bedrails; Increased time required;Verbal cues  (RW)   Stand to Sit 4  Minimal assistance   Additional items Assist x 2; Increased time required;Verbal cues  (RW)   Stand pivot 4  Minimal assistance   Additional items Assist x 2; Increased time required;Verbal cues  (RW)   Additional Comments pt required increased (A) with functional transfers this session due to increased pain and fatigue; in initial stance, pt with x1 episode of LE buckling    Functional Mobility   Functional Mobility 4 Minimal assistance   Additional Comments x1-2 with use of RW during functional mobility; pt requires cues for direction and RW guidance at times; pt limited by R hip pain and increased fatigue during task; pt required chair follow and limited with functional distance this date   Additional items Rolling walker   Balance   Static Sitting Fair +   Dynamic Sitting Fair   Static Standing Fair -   Dynamic Standing Fair -   Ambulatory Fair -   Activity Tolerance   Activity Tolerance Patient limited by fatigue;Patient limited by pain   RUE Assessment   RUE Assessment X  (4/5 grossly; WFL AROM )   LUE Assessment   LUE Assessment X  (4/5 grossly; WFL AROM )   Hand Function   Gross Motor Coordination Functional   Fine Motor Coordination Functional   Sensation   Light Touch No apparent deficits   Sharp/Dull No apparent deficits   Cognition   Overall Cognitive Status Impaired   Arousal/Participation Alert; Cooperative   Attention Attends with cues to redirect   Orientation Level Oriented to person;Oriented to place   Memory Decreased short term memory   Following Commands Follows one step commands without difficulty   Assessment   Limitation Decreased ADL status; Decreased Safe judgement during ADL;Decreased UE strength;Decreased endurance;Decreased self-care trans;Decreased high-level ADLs   Assessment Pt is a 80 y o  male seen for OT evaluation s/p admit to [facility] on 10/27/2018 w/ Open wound of right hip  Comorbidities affecting pt's functional performance at time of assessment include: HTN, limited hearing, previous surgery and dementia  Personal factors affecting pt at time of IE include:steps to enter environment, difficulty performing ADLS, difficulty performing IADLS , limited insight into deficits, decreased initiation and engagement , health management  and environment  Prior to admission, pt was (A) with ADL/IADL performance with use of RW during functional mobility   Upon evaluation: Pt requires min-max (A) x1-2 with use of RW during functional mobility 2* the following deficits impacting occupational performance: weakness, decreased strength, decreased balance, decreased tolerance, impaired initiation, impaired memory, impaired sequencing, impaired problem solving, decreased safety awareness, increased pain and orthopedic restrictions  Pt to benefit from continued skilled OT tx while in the hospital to address deficits as defined above and maximize level of functional independence w ADL's and functional mobility  Occupational Performance areas to address include: grooming, bathing/shower, toilet hygiene, dressing, functional mobility, community mobility and clothing management  From OT standpoint, recommendation at time of d/c would be short term rehab  Goals   Patient Goals to go home    Short Term Goal  pt will perform and tolerate UE strengthening exercises while seated EOB or in chair    Long Term Goal #1 pt will increase independence with functional mobility with RW to (S) level with decreased cues    Long Term Goal #2 pt will demonstrate UB/LB bathing and grooming tasks while seated EOB or in chair at min (A) level with decreased cues    Long Term Goal pt will demonstrate min (A) toilet transfers and toilet hygiene with use of RW   Plan   Treatment Interventions ADL retraining;Functional transfer training;UE strengthening/ROM; Endurance training;Patient/family training; Activityengagement   Goal Expiration Date 11/16/18   OT Frequency 3-5x/wk   Recommendation   OT Discharge Recommendation Short Term Rehab   Barthel Index   Feeding 10   Bathing 0   Grooming Score 0   Dressing Score 5   Bladder Score 5   Bowels Score 5   Toilet Use Score 5   Transfers (Bed/Chair) Score 5   Mobility (Level Surface) Score 10   Stairs Score 0   Barthel Index Score 45     Pt will benefit from continued OT services in order to maximize (I) c ADL performance, FM c RW, and improve overall endurance/strength required to complete functional tasks in preparation for d/c  Pt left seated in chair at end of session; all needs within reach; all lines intact; scds connected and turned on

## 2018-11-02 NOTE — PLAN OF CARE
Problem: PHYSICAL THERAPY ADULT  Goal: Performs mobility at highest level of function for planned discharge setting  See evaluation for individualized goals  Outcome: Progressing  Prognosis: Good  Problem List: Decreased strength, Decreased range of motion, Decreased endurance, Impaired balance, Decreased mobility, Decreased safety awareness, Pain, Orthopedic restrictions  Assessment: Pt is a 80year old male presenting to [facility] with open wound R hip  Pt was being treated by PT requiring min(A) for mobility and ambulation greater than 25ft with RW with min(A)  Pt however was taken to OR 11/1/2018 for removal of painful hardware R hip therefore reassessed following surgery  Pt is WBAT R LE per Dr Reed Magdaleno and presents with decreased ROM increased pain decreased strength balance endurance and mobility requiring mod(A)x2 for all bed mobility and min(A)x2 for all transfers with ambulation limited to 15ft requiring chair follow with increased risk for falls  Pt with decreased foot clearance hip pain and weakness during ambulation and will require short term rehab on discharge  Pt is in need of continued activity in PT to improve pain ROM strength balance endurance mobility transfers and ambulation  Recommendation: Short-term skilled PT     PT - OK to Discharge: Yes (to next level of care when medically stable for discharge)    See flowsheet documentation for full assessment

## 2018-11-02 NOTE — PHYSICAL THERAPY NOTE
PT Re-Evaluation     11/02/18 0905   Note Type   Note type Re-eval   Pain Assessment   Pain Score Worst Possible Pain   Pain Type Surgical pain   Pain Location Hip   Pain Orientation Right   Home Living   Additional Comments See initial evaluation for home living   Prior Function   Comments see initial evaluation for PLOF   Restrictions/Precautions   Weight Bearing Precautions Per Order Yes   RLE Weight Bearing Per Order WBAT   Other Precautions Chair Alarm; Bed Alarm;Telemetry; Fall Risk;Pain   General   Family/Caregiver Present No   Cognition   Arousal/Participation Alert   Orientation Level Oriented to person;Oriented to place   Following Commands Follows one step commands without difficulty   RLE Assessment   RLE Assessment X  (Limited hip and knee ext ROM, flex and ankle WFL)   LLE Assessment   LLE Assessment WFL  (4/5)   Coordination   Sensation WFL   Light Touch   RLE Light Touch Grossly intact   LLE Light Touch Grossly intact   Bed Mobility   Supine to Sit 3  Moderate assistance   Additional items Assist x 2;Bedrails;Verbal cues;LE management   Sit to Supine (seated in chair at bedside, bell in reach, alarm on)   Additional Comments Pt on 2 5L's throughout session  Increased assistance required with bed mobility due to decreased ROM and increased R hip pain from hardware removal   Transfers   Sit to Stand 4  Minimal assistance   Additional items Assist x 2;Verbal cues; Bedrails  (with RW)   Stand to Sit 4  Minimal assistance   Additional items Assist x 2;Verbal cues  (with RW)   Stand pivot 4  Minimal assistance   Additional items Assist x 2;Verbal cues  (with RW)   Additional Comments pt with decreased foot clearance and limited R hip flex during ambulation  Pt with decreased tolerance for weightbearing on R LE requiring min(A)x2 for ambulation with limited ambulation tolerance due to pain and fatigue     Ambulation/Elevation   Gait pattern Step to;Short stride;Decreased foot clearance;Decreased R stance; Antalgic; Forward Flexion   Gait Assistance 4  Minimal assist   Additional items Assist x 2   Assistive Device Rolling walker   Distance 15ft with RW min(A)x2 requiring chair follow with decreased weightbearing L knee near buckling at times and decreased foot clearance bilaterally  pt at increased risk for falls due to altered gait pattern  Balance   Static Sitting Good   Dynamic Sitting Fair   Static Standing Fair -  (with RW)   Dynamic Standing Fair -  (with RW)   Ambulatory Fair -  (with RW)   Endurance Deficit   Endurance Deficit Yes   Endurance Deficit Description limited ambulation and activity tolerance due to pain   Activity Tolerance   Activity Tolerance Patient limited by fatigue;Patient limited by pain   Assessment   Prognosis Good   Problem List Decreased strength;Decreased range of motion;Decreased endurance; Impaired balance;Decreased mobility; Decreased safety awareness;Pain;Orthopedic restrictions   Assessment Pt is a 80year old male presenting to [facility] with open wound R hip  Pt was being treated by PT requiring min(A) for mobility and ambulation greater than 25ft with RW with min(A)  Pt however was taken to OR 11/1/2018 for removal of painful hardware R hip therefore reassessed following surgery  Pt is WBAT R LE per Dr Capri Walls and presents with decreased ROM increased pain decreased strength balance endurance and mobility requiring mod(A)x2 for all bed mobility and min(A)x2 for all transfers with ambulation limited to 15ft requiring chair follow with increased risk for falls  Pt with decreased foot clearance hip pain and weakness during ambulation and will require short term rehab on discharge  Pt is in need of continued activity in PT to improve pain ROM strength balance endurance mobility transfers and ambulation      Goals   Patient Goals To feel better decrease pain and return home   LTG Expiration Date 11/16/18   Long Term Goal #1 improve bilateral LE strength by 1/2 muscle grade to improve bed mobility and transfers to (S) with RW   Long Term Goal #2 Improve sitting standing and ambulatory balance to fair+ with RW to improve ambulation to 100ft with RW (S) no LOB   Plan   Treatment/Interventions Functional transfer training;LE strengthening/ROM; Therapeutic exercise; Endurance training;Patient/family training;Bed mobility;Gait training   PT Frequency 5x/wk   Recommendation   Recommendation Short-term skilled PT   PT - OK to Discharge Yes  (to next level of care when medically stable for discharge)   Pt with SCD's on when PT entered room  SCD's reapplied and turned on with pt seated in chair at bedside with call bell in reach and chair alarm on

## 2018-11-02 NOTE — SOCIAL WORK
In morning inner- disciplinary meeting PT is recommending that the patient needs rehab  Pt's son Jorge Escobar  ([phone] ) is agreeable to the patient going to rehab on the 5th floor only  Referral was made as requested

## 2018-11-02 NOTE — PLAN OF CARE
Problem: Nutrition/Hydration-ADULT  Goal: Nutrient/Hydration intake appropriate for improving, restoring or maintaining nutritional needs  Monitor and assess patient's nutrition/hydration status for malnutrition (ex- brittle hair, bruises, dry skin, pale skin and conjunctiva, muscle wasting, smooth red tongue, and disorientation)  Collaborate with interdisciplinary team and initiate plan and interventions as ordered  Monitor patient's weight and dietary intake as ordered or per policy  Utilize nutrition screening tool and intervene per policy  Determine patient's food preferences and provide high-protein, high-caloric foods as appropriate  INTERVENTIONS:  - Monitor oral intake, urinary output, labs, and treatment plans  - Assess nutrition and hydration status and recommend course of action  - Evaluate amount of meals eaten  - Assist patient with eating if necessary   - Allow adequate time for meals  - Recommend/ encourage appropriate diets, oral nutritional supplements, and vitamin/mineral supplements  - Order, calculate, and assess calorie counts as needed  - Recommend, monitor, and adjust tube feedings and TPN/PPN based on assessed needs  - Assess need for intravenous fluids  - Provide specific nutrition/hydration education as appropriate  - Include patient/family/caregiver in decisions related to nutrition   Outcome: Progressing  Pt will continue to consume >75% of meals and supplements  Continue diet and supplements as ordered

## 2018-11-02 NOTE — PROGRESS NOTES
Progress Note Joseline 1921, 80 y o  male MRN: [id number]    Unit/Bed#: 427-01 Encounter: [id number]    Primary Care Provider: Author Malik DO   Date and time admitted to hospital: 10/27/2018  8:17 AM        * Open wound of right hip   Assessment & Plan    · Cellulitis continues to improve, discontinue antibiotics  · History of right hip surgery in  by Dr Shakira Conner (Orthopedic Surgery)  · Bedside debridement was performed on 10/30/2018  · Continue local wound care       Retained orthopedic hardware   Assessment & Plan    · Patient is postop day 1  From screw removal     Severe protein-calorie malnutrition ([address] )   Assessment & Plan    Malnutrition Findings:   Malnutrition type: Chronic illness  Degree of Malnutrition: Other severe protein calorie malnutrition    BMI Findings: Body mass index is 21 26 kg/m²  Nutrition consult  Will resume diet and dietary supplements post operatively as recommended by dietician  Gait instability   Assessment & Plan    · PT/OT  · Recommend short-term rehab/PT OT         VTE Pharmacologic Prophylaxis:   Pharmacologic: Heparin  Mechanical VTE Prophylaxis in Place: Yes    Patient Centered Rounds: I have performed bedside rounds with nursing staff today        Current Length of Stay: 6 day(s)    Current Patient Status: Inpatient   Certification Statement: The patient will continue to require additional inpatient hospital stay due to Severe right hip pain with limited mobility    Discharge Plan / Estimated Discharge Date:  Plan is discharge to rehab facility once placement is obtained and insurance approves discharge      Code Status: Level 1 - Full Code      Subjective:   Severe right hip pain    Objective:     Vitals:   Temp (24hrs), Av 2 °F (30 1 °C), Min:37 4 °F (3 °C), Max:98 9 °F (37 2 °C)    Temp:  [37 4 °F (3 °C)-98 9 °F (37 2 °C)] 98 9 °F (37 2 °C)  HR:  [] 68  Resp:  [16-20] 18  BP: (123-145)/(58-71) 126/60  SpO2:  [91 %-98 %] 93 %  Body mass index is 21 26 kg/m²  Input and Output Summary (last 24 hours): Intake/Output Summary (Last 24 hours) at 11/02/18 1602  Last data filed at 11/02/18 1500   Gross per 24 hour   Intake             1720 ml   Output              475 ml   Net             1245 ml       Physical Exam:     Physical Exam   Constitutional: He is oriented to person, place, and time  He appears well-developed and well-nourished  No distress  Pulmonary/Chest: Effort normal and breath sounds normal  No respiratory distress  He has no wheezes  Abdominal: Soft  Bowel sounds are normal  He exhibits no distension  There is no tenderness  Neurological: He is alert and oriented to person, place, and time  No cranial nerve deficit  Coordination normal    Skin: Skin is warm and dry  No rash noted  He is not diaphoretic  No erythema  Nursing note and vitals reviewed  Additional Data:     Labs:      Results from last 7 days  Lab Units 11/02/18  0505   WBC Thousand/uL 5 95   HEMOGLOBIN g/dL 11 8*   HEMATOCRIT % 37 8   PLATELETS Thousands/uL 231   NEUTROS PCT % 68   LYMPHS PCT % 14   MONOS PCT % 14*   EOS PCT % 3       Results from last 7 days  Lab Units 11/02/18  0505 10/30/18  0605   POTASSIUM mmol/L 3 9 3 4*   CHLORIDE mmol/L 109* 108   CO2 mmol/L 27 22   BUN mg/dL 26* 19   CREATININE mg/dL 1 09 1 19   CALCIUM mg/dL 8 5 8 3   ALK PHOS U/L  --  92   ALT U/L  --  6*   AST U/L  --  47*               Recent Cultures (last 7 days):       Results from last 7 days  Lab Units 11/01/18  1427 10/29/18  1242 10/27/18  0842 10/27/18  0840   BLOOD CULTURE   --   --  No Growth After 5 Days  No Growth After 5 Days     GRAM STAIN RESULT  No polys seen  No bacteria seen  --   --   --    URINE CULTURE   --  No Growth <1000 cfu/mL  --   --    WOUND CULTURE  No growth  --   --   --        Last 24 Hours Medication List:     Current Facility-Administered Medications:  acetaminophen 650 mg Oral Q6H PRN Sara Chen, DO   aspirin 81 mg Oral Daily [employer], DO   bupivacaine-epinephrine (PF) 20 mL Infiltration Once PRN Adebayo Owens MD   dextrose 25 mL Intravenous PRN [employer], DO   docusate sodium 100 mg Oral BID [employer], DO   heparin (porcine) 5,000 Units Subcutaneous [facility] [address] Andre [employer], DO   HYDROmorphone 0 2 mg Intravenous Q3H PRN [employer], DO   metoprolol succinate 25 mg Oral Daily [employer], DO   OLANZapine 10 mg Oral BID PRN Otto Penny MD   ondansetron 4 mg Intravenous Q4H PRN [employer], DO   oxyCODONE 5 mg Oral Q4H PRN [employer], DO        Today, Patient Was Seen By: Kayden Fraire DO

## 2018-11-02 NOTE — SOCIAL WORK
Pt was approved to go to rehab by [employer]   Auth number [id number]    [facility]   therapy minutes  Pt is approved for 3 days with an update on 10/5/18  Send update to [phone]  5th floor does not have a bed until probably Monday   Still have not got a definite answer if they can accept pt on Monday if they have a bed   Spoke to patients son Sharmin Harrell and he might possibly consider [place] but he has to talk to his family first

## 2018-11-02 NOTE — ASSESSMENT & PLAN NOTE
Malnutrition Findings:   Malnutrition type: Chronic illness  Degree of Malnutrition: Other severe protein calorie malnutrition    BMI Findings: Body mass index is 21 26 kg/m²  Nutrition consult  Will resume diet and dietary supplements post operatively as recommended by dietician

## 2018-11-03 LAB
GLUCOSE SERPL-MCNC: 103 MG/DL (ref 65–140)
GLUCOSE SERPL-MCNC: 115 MG/DL (ref 65–140)
GLUCOSE SERPL-MCNC: 123 MG/DL (ref 65–140)
GLUCOSE SERPL-MCNC: 90 MG/DL (ref 65–140)

## 2018-11-03 PROCEDURE — 82948 REAGENT STRIP/BLOOD GLUCOSE: CPT

## 2018-11-03 PROCEDURE — 99232 SBSQ HOSP IP/OBS MODERATE 35: CPT | Performed by: INTERNAL MEDICINE

## 2018-11-03 RX ADMIN — ACETAMINOPHEN 650 MG: 325 TABLET, FILM COATED ORAL at 21:14

## 2018-11-03 RX ADMIN — METOPROLOL SUCCINATE 25 MG: 25 TABLET, EXTENDED RELEASE ORAL at 08:17

## 2018-11-03 RX ADMIN — OXYCODONE HYDROCHLORIDE 5 MG: 5 TABLET ORAL at 00:13

## 2018-11-03 RX ADMIN — DOCUSATE SODIUM 100 MG: 100 CAPSULE, LIQUID FILLED ORAL at 08:16

## 2018-11-03 RX ADMIN — HEPARIN SODIUM 5000 UNITS: 5000 INJECTION, SOLUTION INTRAVENOUS; SUBCUTANEOUS at 21:13

## 2018-11-03 RX ADMIN — HEPARIN SODIUM 5000 UNITS: 5000 INJECTION, SOLUTION INTRAVENOUS; SUBCUTANEOUS at 06:02

## 2018-11-03 RX ADMIN — HEPARIN SODIUM 5000 UNITS: 5000 INJECTION, SOLUTION INTRAVENOUS; SUBCUTANEOUS at 14:17

## 2018-11-03 RX ADMIN — DOCUSATE SODIUM 100 MG: 100 CAPSULE, LIQUID FILLED ORAL at 17:29

## 2018-11-03 RX ADMIN — ASPIRIN 81 MG 81 MG: 81 TABLET ORAL at 08:17

## 2018-11-03 NOTE — PROGRESS NOTES
Progress Note Joseline 1921, 80 y o  male MRN: [id number]    Unit/Bed#: 427-01 Encounter: [id number]    Primary Care Provider: Author Malik DO   Date and time admitted to hospital: 10/27/2018  8:17 AM        * Open wound of right hip   Assessment & Plan    · Cellulitis resolved, patient completed antibiotic course  · History of right hip surgery in  by Dr Shakira Conner (Orthopedic Surgery)  · Bedside debridement was performed on 10/30/2018  · Continue local wound care       Retained orthopedic hardware   Assessment & Plan    · Patient is postop day 2 , status post orthopedic hardware removal from right hip     Severe protein-calorie malnutrition ([address] )   Assessment & Plan    Malnutrition Findings:   Malnutrition type: Chronic illness  Degree of Malnutrition: Other severe protein calorie malnutrition    BMI Findings: Body mass index is 21 26 kg/m²  Nutrition consult  Will resume diet and dietary supplements post operatively as recommended by dietician  Essential hypertension   Assessment & Plan    · Continue toprol XL  · Per the patient's son, the patient does not take lisinopril at this time  · Follow the blood pressure trend  · Outpatient follow-up with his PCP in regards to this matter     Gait instability   Assessment & Plan    · PT/OT  · Recommend short-term rehab/PT OT         VTE Pharmacologic Prophylaxis:   Pharmacologic: Heparin  Mechanical VTE Prophylaxis in Place: Yes    Patient Centered Rounds: I have performed bedside rounds with nursing staff today      Current Length of Stay: 7 day(s)    Current Patient Status: Inpatient       Code Status: Level 1 - Full Code      Subjective:   No pain    Objective:     Vitals:   Temp (24hrs), Av 4 °F (36 9 °C), Min:97 7 °F (36 5 °C), Max:98 9 °F (37 2 °C)    Temp:  [97 7 °F (36 5 °C)-98 9 °F (37 2 °C)] 98 5 °F (36 9 °C)  HR:  [68-72] 70  Resp:  [18] 18  BP: (126-140)/(60-85) 140/85  SpO2:  [92 %-96 %] 96 %  Body mass index is 21 26 kg/m²  Input and Output Summary (last 24 hours): Intake/Output Summary (Last 24 hours) at 11/03/18 1442  Last data filed at 11/02/18 1813   Gross per 24 hour   Intake              120 ml   Output              275 ml   Net             -155 ml       Physical Exam:     Physical Exam   Constitutional: He is oriented to person, place, and time  No distress  Cachectic elderly male   Pulmonary/Chest: Effort normal and breath sounds normal  No respiratory distress  He has no wheezes  Abdominal: Soft  Bowel sounds are normal  He exhibits no distension  There is no tenderness  Musculoskeletal:   Right hip dressing dry clean and intact, tenderness to palpation   Neurological: He is alert and oriented to person, place, and time  No cranial nerve deficit  Coordination normal    Skin: He is not diaphoretic  Psychiatric: He has a normal mood and affect  His behavior is normal  Judgment and thought content normal    Nursing note and vitals reviewed  Additional Data:     Labs:      Results from last 7 days  Lab Units 11/02/18  0505   WBC Thousand/uL 5 95   HEMOGLOBIN g/dL 11 8*   HEMATOCRIT % 37 8   PLATELETS Thousands/uL 231   NEUTROS PCT % 68   LYMPHS PCT % 14   MONOS PCT % 14*   EOS PCT % 3       Results from last 7 days  Lab Units 11/02/18  0505 10/30/18  0605   POTASSIUM mmol/L 3 9 3 4*   CHLORIDE mmol/L 109* 108   CO2 mmol/L 27 22   BUN mg/dL 26* 19   CREATININE mg/dL 1 09 1 19   CALCIUM mg/dL 8 5 8 3   ALK PHOS U/L  --  92   ALT U/L  --  6*   AST U/L  --  47*           * I Have Reviewed All Lab Data Listed Above  * Additional Pertinent Lab Tests Reviewed:  [facility] Admission Reviewed      Recent Cultures (last 7 days):       Results from last 7 days  Lab Units 11/01/18  1427 10/29/18  1242   GRAM STAIN RESULT  No polys seen  No bacteria seen  --    URINE CULTURE   --  No Growth <1000 cfu/mL   WOUND CULTURE  No growth  --        Last 24 Hours Medication List:     Current Facility-Administered Medications:  acetaminophen 650 mg Oral Q6H PRN Lilliam Curb, DO   aspirin 81 mg Oral Daily Lilliam Curb, DO   bupivacaine-epinephrine (PF) 20 mL Infiltration Once PRN Jonel García MD   dextrose 25 mL Intravenous PRN Lilliam Curb, DO   docusate sodium 100 mg Oral BID Lilliam Curb, DO   heparin (porcine) 5,000 Units Subcutaneous [facility] [address] Lilliam Cherylb, DO   HYDROmorphone 0 2 mg Intravenous Q3H PRN Lilliam Curb, DO   metoprolol succinate 25 mg Oral Daily Lilliam Curb, DO   OLANZapine 10 mg Oral BID PRN Jo Ann Miller MD   ondansetron 4 mg Intravenous Q4H PRN Lilliam Curb, DO   oxyCODONE 5 mg Oral Q4H PRN Lilliam Curb, DO        Today, Patient Was Seen By: Jhoana Fletcher DO

## 2018-11-03 NOTE — ASSESSMENT & PLAN NOTE
· Cellulitis resolved, patient completed antibiotic course    · History of right hip surgery in 2014 by Dr Florentin Calhoun (Orthopedic Surgery)  · Bedside debridement was performed on 10/30/2018  · Continue local wound care

## 2018-11-04 PROBLEM — Z96.9 RETAINED ORTHOPEDIC HARDWARE: Status: RESOLVED | Noted: 2018-10-27 | Resolved: 2018-11-04

## 2018-11-04 LAB
BACTERIA SPEC ANAEROBE CULT: NO GROWTH
BACTERIA WND AEROBE CULT: NO GROWTH
GLUCOSE SERPL-MCNC: 114 MG/DL (ref 65–140)
GLUCOSE SERPL-MCNC: 133 MG/DL (ref 65–140)
GLUCOSE SERPL-MCNC: 87 MG/DL (ref 65–140)
GLUCOSE SERPL-MCNC: 90 MG/DL (ref 65–140)
GRAM STN SPEC: NORMAL
GRAM STN SPEC: NORMAL

## 2018-11-04 PROCEDURE — 82948 REAGENT STRIP/BLOOD GLUCOSE: CPT

## 2018-11-04 PROCEDURE — 99232 SBSQ HOSP IP/OBS MODERATE 35: CPT | Performed by: INTERNAL MEDICINE

## 2018-11-04 RX ADMIN — OXYCODONE HYDROCHLORIDE 5 MG: 5 TABLET ORAL at 01:19

## 2018-11-04 RX ADMIN — DOCUSATE SODIUM 100 MG: 100 CAPSULE, LIQUID FILLED ORAL at 17:00

## 2018-11-04 RX ADMIN — ASPIRIN 81 MG 81 MG: 81 TABLET ORAL at 08:48

## 2018-11-04 RX ADMIN — HEPARIN SODIUM 5000 UNITS: 5000 INJECTION, SOLUTION INTRAVENOUS; SUBCUTANEOUS at 13:18

## 2018-11-04 RX ADMIN — OXYCODONE HYDROCHLORIDE 5 MG: 5 TABLET ORAL at 13:16

## 2018-11-04 RX ADMIN — OXYCODONE HYDROCHLORIDE 5 MG: 5 TABLET ORAL at 18:05

## 2018-11-04 RX ADMIN — HEPARIN SODIUM 5000 UNITS: 5000 INJECTION, SOLUTION INTRAVENOUS; SUBCUTANEOUS at 21:48

## 2018-11-04 RX ADMIN — HEPARIN SODIUM 5000 UNITS: 5000 INJECTION, SOLUTION INTRAVENOUS; SUBCUTANEOUS at 05:33

## 2018-11-04 RX ADMIN — METOPROLOL SUCCINATE 25 MG: 25 TABLET, EXTENDED RELEASE ORAL at 08:48

## 2018-11-04 RX ADMIN — DOCUSATE SODIUM 100 MG: 100 CAPSULE, LIQUID FILLED ORAL at 08:48

## 2018-11-04 RX ADMIN — OXYCODONE HYDROCHLORIDE 5 MG: 5 TABLET ORAL at 09:04

## 2018-11-04 NOTE — ASSESSMENT & PLAN NOTE
· POA with associated cellulitis  Antibiotic regimen has been completed  · Bedside debridement was performed on 10/30/2018 by orthopedics  · Continue local wound care

## 2018-11-04 NOTE — ASSESSMENT & PLAN NOTE
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

## 2018-11-04 NOTE — ASSESSMENT & PLAN NOTE
· Well controlled on Toprol XL  · Per the patient's son, the patient does not take lisinopril at this time  · Follow the blood pressure trend

## 2018-11-04 NOTE — ASSESSMENT & PLAN NOTE
Malnutrition Findings:   Malnutrition type: Chronic illness  Degree of Malnutrition: Other severe protein calorie malnutrition    BMI Findings: Body mass index is 21 26 kg/m²  · Continue nutritional supplements

## 2018-11-05 VITALS
DIASTOLIC BLOOD PRESSURE: 75 MMHG | OXYGEN SATURATION: 96 % | WEIGHT: 148.15 LBS | HEART RATE: 75 BPM | TEMPERATURE: 98.8 F | RESPIRATION RATE: 20 BRPM | HEIGHT: 70 IN | BODY MASS INDEX: 21.21 KG/M2 | SYSTOLIC BLOOD PRESSURE: 139 MMHG

## 2018-11-05 LAB
GLUCOSE SERPL-MCNC: 101 MG/DL (ref 65–140)
GLUCOSE SERPL-MCNC: 88 MG/DL (ref 65–140)

## 2018-11-05 PROCEDURE — 97116 GAIT TRAINING THERAPY: CPT

## 2018-11-05 PROCEDURE — 82948 REAGENT STRIP/BLOOD GLUCOSE: CPT

## 2018-11-05 PROCEDURE — 99239 HOSP IP/OBS DSCHRG MGMT >30: CPT | Performed by: INTERNAL MEDICINE

## 2018-11-05 PROCEDURE — 97530 THERAPEUTIC ACTIVITIES: CPT

## 2018-11-05 RX ORDER — ACETAMINOPHEN 325 MG/1
650 TABLET ORAL EVERY 6 HOURS PRN
Refills: 0
Start: 2018-11-05

## 2018-11-05 RX ORDER — DOCUSATE SODIUM 100 MG/1
100 CAPSULE, LIQUID FILLED ORAL 2 TIMES DAILY
Refills: 0
Start: 2018-11-05

## 2018-11-05 RX ORDER — METOPROLOL SUCCINATE 25 MG/1
25 TABLET, EXTENDED RELEASE ORAL DAILY
Refills: 0
Start: 2018-11-05

## 2018-11-05 RX ORDER — OXYCODONE HYDROCHLORIDE 10 MG/1
10 TABLET ORAL EVERY 4 HOURS PRN
Status: DISCONTINUED | OUTPATIENT
Start: 2018-11-05 | End: 2018-11-05 | Stop reason: HOSPADM

## 2018-11-05 RX ORDER — HEPARIN SODIUM 5000 [USP'U]/ML
5000 INJECTION, SOLUTION INTRAVENOUS; SUBCUTANEOUS EVERY 8 HOURS SCHEDULED
Qty: 1 ML | Refills: 0
Start: 2018-11-05

## 2018-11-05 RX ADMIN — HEPARIN SODIUM 5000 UNITS: 5000 INJECTION, SOLUTION INTRAVENOUS; SUBCUTANEOUS at 06:13

## 2018-11-05 RX ADMIN — ASPIRIN 81 MG 81 MG: 81 TABLET ORAL at 08:35

## 2018-11-05 RX ADMIN — HEPARIN SODIUM 5000 UNITS: 5000 INJECTION, SOLUTION INTRAVENOUS; SUBCUTANEOUS at 13:53

## 2018-11-05 RX ADMIN — OXYCODONE HYDROCHLORIDE 5 MG: 5 TABLET ORAL at 03:54

## 2018-11-05 RX ADMIN — OXYCODONE HYDROCHLORIDE 5 MG: 5 TABLET ORAL at 13:52

## 2018-11-05 RX ADMIN — DOCUSATE SODIUM 100 MG: 100 CAPSULE, LIQUID FILLED ORAL at 08:35

## 2018-11-05 RX ADMIN — METOPROLOL SUCCINATE 25 MG: 25 TABLET, EXTENDED RELEASE ORAL at 08:35

## 2018-11-05 RX ADMIN — OXYCODONE HYDROCHLORIDE 10 MG: 10 TABLET ORAL at 10:35

## 2018-11-05 NOTE — ASSESSMENT & PLAN NOTE
· The patient was evaluated by physical therapy and occupational therapy during the hospitalization  · Short-term rehabilitation placement was recommended upon discharge  · The patient was discharged to [facility] for short-term rehabilitation

## 2018-11-05 NOTE — ASSESSMENT & PLAN NOTE
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 with the screw cutting into the acetabulum  · Patient is POD #4 for orthopedic hardware removal from the right hip per Dr Dwain Mccarty (Orthopedic Surgery)  SURGERY DATE: 11/1/2018     Surgeon(s) and Role:     * Dwain Mccarty MD - Primary     * Wilbert Chavez PA-C - Assisting  no qualified resident available     Preop Diagnosis:  Retained orthopedic hardware [Z96 9], painful with collapsed femoral head and screws cutting into the acetabulum  Decubitus ulcer right posterior superior hip     Post-Op Diagnosis Codes:     * Retained orthopedic hardware [Z96 9], painful hardware  Debridement of decubitus ulcer     Procedure(s) (LRB):  Removal of cannulated screws (Right)    · Incentive spirometry 10 times per hour while awake  · DVT prophylaxis  · The patient was evaluated by physical therapy and occupational therapy during the hospitalization  · Short-term rehabilitation placement was recommended upon discharge  · The patient was discharged to [facility] for short-term rehabilitation

## 2018-11-05 NOTE — PHYSICAL THERAPY NOTE
PT Treatment note      11/05/18 1002   Restrictions/Precautions   RLE Weight Bearing Per Order WBAT   Other Precautions (Chair Alarm; Bed Alarm;Multiple lines;Telemetry; Fall Risk)   Subjective   Subjective Reports he will try to ambulate but his R hip is sore  Bed Mobility   Rolling L 3  Moderate assistance   Additional items Assist x 1; Increased time required;Verbal cues   Supine to Sit 3  Moderate assistance   Additional items Assist x 2;HOB elevated; Bedrails; Increased time required;Verbal cues;LE management   Transfers   Sit to Stand 4  Minimal assistance   Additional items Assist x 2;Bedrails; Increased time required;Verbal cues   Stand to Sit 4  Minimal assistance   Additional items Assist x 2;Armrests; Verbal cues; Increased time required   Stand pivot 4  Minimal assistance   Additional items Assist x 2;Verbal cues   Ambulation/Elevation   Gait pattern Forward Flexion;Decreased R stance; Short stride;Decreased foot clearance   Gait Assistance 4  Minimal assist   Additional items Verbal cues; Assist x 1   Assistive Device Rolling walker   Distance 50' with chair follow   Balance   Static Sitting Fair +   Dynamic Sitting Fair +   Static Standing Fair   Dynamic Standing David Gregory [phone] -  (withb RW)   Endurance Deficit   Endurance Deficit Yes   Endurance Deficit Description limited ambulation and activity tolerance due to pain   Activity Tolerance   Activity Tolerance Patient limited by fatigue   Assessment   Prognosis Good   Problem List Decreased strength;Decreased endurance;Decreased range of motion; Impaired balance;Decreased mobility;Orthopedic restrictions;Pain;Decreased safety awareness   Assessment Pt  performing bed mobility, tx and ambulation at (mod-min) x 1-2  Requires verbal cues for technique and safety  Decreased balance and safety increasing pt risk for falls  Chair follow for safety  Pt  is in need of continued PT to improve strength, ROM, endurance balance and functional mobility   Pt  will require STR to address deficits and maximize return to PLOF  Plan   Treatment/Interventions Functional transfer training;LE strengthening/ROM; Therapeutic exercise; Endurance training;Bed mobility;Gait training   Progress Progressing toward goals   Recommendation   Recommendation Short-term skilled PT   Pt  OOB in chair  with call bell within reach, scd's connected and turned on and alarm on at end of PT session  Discussed with Lindsay John, PT today's treatment and patient's current level of function for care coordination

## 2018-11-05 NOTE — SOCIAL WORK
Received update authorization from [facility]  Auth number is [id number]   [facility] is [facility]   Pt is approved for 543 therapy minutes  Update in 3 days on 11/8/18  Update to [phone]  [facility] is able to pick the pt up 2:45 PM  [facility] , nurse and patients  notified of transport time

## 2018-11-05 NOTE — PROGRESS NOTES
Patient discharged to [facility] in satisfactory condition, offers no complaints at this time

## 2018-11-05 NOTE — SOCIAL WORK
Pt is being discharged today   Pt is going to [facility]   Pt is a readmission   Sent all information to [facility] to update the authorization I am awaiting there call back   [address] home told them I am awaiting insurance authorization  Unable to set ambulance up because currently awaiting auth  Case Management reviewed discharge planning process including the following: identifying help that is needed at home, pt's preference for discharge needs and Meds at [facility]  Reviewed with Pt that any member of the healthcare team can answer questions regarding : medications, jmportance of recognizing  Signs and symptoms of any  medical problems  Case Management also encouraged pt to follow up with all recommended appointments after discharge

## 2018-11-05 NOTE — ASSESSMENT & PLAN NOTE
Malnutrition Findings:   Malnutrition type: Chronic illness  Degree of Malnutrition: Other severe protein calorie malnutrition    BMI Findings: Body mass index is 21 26 kg/m²       · The patient was evaluated by the dietitian/nutritionist during the hospitalization  · Continue the nutritional supplements per the dietitian's recommendations

## 2018-11-05 NOTE — PLAN OF CARE
Problem: PHYSICAL THERAPY ADULT  Goal: Performs mobility at highest level of function for planned discharge setting  See evaluation for individualized goals  Outcome: Progressing  Prognosis: Good  Problem List: Decreased strength, Decreased endurance, Decreased range of motion, Impaired balance, Decreased mobility, Orthopedic restrictions, Pain, Decreased safety awareness  Assessment: Pt  performing bed mobility, tx and ambulation at (mod-min) x 1-2  Requires verbal cues for technique and safety  Decreased balance and safety increasing pt risk for falls  Chair follow for safety  Pt  is in need of continued PT to improve strength, ROM, endurance balance and functional mobility  Pt  will require STR to address deficits and maximize return to PLOF  Recommendation: Short-term skilled PT     PT - OK to Discharge: Yes (to next level of care when medically stable for discharge)    See flowsheet documentation for full assessment

## 2018-11-05 NOTE — ASSESSMENT & PLAN NOTE
· Baseline creatinine of 1 0-1 5  · Avoid all nephrotoxic agents  · Outpatient follow-up with his PCP in regards to this matter

## 2018-11-05 NOTE — PHYSICAL THERAPY NOTE
PT treatment Note      11/05/18 1323   Restrictions/Precautions   Other Precautions (Chair Alarm; Bed Alarm;Multiple lines;Telemetry; Fall Risk)   Subjective   Subjective Tired would like to return to bed  Bed Mobility   Sit to Supine 4  Minimal assistance   Additional items Assist x 2;HOB elevated; Bedrails; Increased time required;Verbal cues;LE management   Transfers   Sit to Stand 4  Minimal assistance   Additional items Assist x 1; Armrests; Increased time required;Verbal cues   Stand to Sit 4  Minimal assistance   Additional items Assist x 1;Verbal cues; Increased time required   Stand pivot 4  Minimal assistance   Additional items Verbal cues   Ambulation/Elevation   Gait pattern (Forward Flexion;Decreased R stance; Short stride;Decreased fo)   Gait Assistance 4  Minimal assist   Additional items Assist x 1;Verbal cues   Assistive Device Rolling walker   Distance 5' chair to bed   Balance   Static Sitting Fair +   Dynamic Sitting Fair +   Static Standing Fair   Dynamic Standing David Gregory 1313 -  (with RW)   Endurance Deficit   Endurance Deficit Yes   Activity Tolerance   Activity Tolerance Patient limited by fatigue;Patient limited by pain   Assessment   Prognosis Good   Problem List Decreased strength;Decreased range of motion;Decreased endurance; Impaired balance;Decreased mobility; Decreased safety awareness;Orthopedic restrictions;Pain   Assessment Pt  performing bed mobility, tx and ambulation at (min) x 1-2  Requires verbal cues for technique and safety  Decreased balance and safety increasing pt risk for falls  Chair follow for safety  Pt  is in need of continued PT to improve strength, ROM, endurance balance and functional mobility  Pt  will require STR to address deficits and maximize return to PLOF  Plan   Treatment/Interventions Functional transfer training;LE strengthening/ROM; Therapeutic exercise; Endurance training;Bed mobility;Gait training   Progress Progressing toward goals   Recommendation Recommendation Short-term skilled PT   Pt  In bed  with call bell within reach, scd's connected and turned on and alarm on at end of PT session  Discussed with Ngoc Easton, PT today's treatment and patient's current level of function for care coordination

## 2018-11-05 NOTE — PLAN OF CARE
Problem: OCCUPATIONAL THERAPY ADULT  Goal: Performs self-care activities at highest level of function for planned discharge setting  See evaluation for individualized goals  Treatment Interventions: ADL retraining, Functional transfer training, UE strengthening/ROM, Endurance training, Patient/family training, Activityengagement          See flowsheet documentation for full assessment, interventions and recommendations      Outcome: Progressing  Limitation: Decreased ADL status, Decreased Safe judgement during ADL, Decreased UE strength, Decreased endurance, Decreased self-care trans, Decreased high-level ADLs     Assessment: pt is progressing towards goals during hospital admission; pt with increased distance, however still with difficulties during LB dressing tasks; pt will benefit from continued OT intervention in order to maximize (I) with ADL performance prior to discharge; recommend short term rehab on discharge in order to maximize (I) prior to return home      OT Discharge Recommendation: Short Term Rehab

## 2018-11-05 NOTE — PLAN OF CARE
DISCHARGE PLANNING     Discharge to home or other facility with appropriate resources Completed        DISCHARGE PLANNING - CARE MANAGEMENT     Discharge to post-acute care or home with appropriate resources Completed        INFECTION - ADULT     Absence or prevention of progression during hospitalization Completed        Knowledge Deficit     Patient/family/caregiver demonstrates understanding of disease process, treatment plan, medications, and discharge instructions Completed        METABOLIC, FLUID AND ELECTROLYTES - ADULT     Electrolytes maintained within normal limits Completed     Fluid balance maintained Completed        MUSCULOSKELETAL - ADULT     Maintain or return mobility to safest level of function Completed        Nutrition/Hydration-ADULT     Nutrient/Hydration intake appropriate for improving, restoring or maintaining nutritional needs Completed        PAIN - ADULT     Verbalizes/displays adequate comfort level or baseline comfort level Completed        Potential for Falls     Patient will remain free of falls Completed        Prexisting or High Potential for Compromised Skin Integrity     Skin integrity is maintained or improved Completed        SAFETY ADULT     Maintain or return to baseline ADL function Completed     Maintain or return mobility status to optimal level Completed     Patient will remain free of falls Completed        SKIN/TISSUE INTEGRITY - ADULT     Skin integrity remains intact Completed     Incision(s), wounds(s) or drain site(s) healing without S/S of infection Completed     Oral mucous membranes remain intact Completed

## 2018-11-05 NOTE — ASSESSMENT & PLAN NOTE
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  Similar changes are noted over the bilateral cerebellar convexities  The basal cisterns remain patent      VISUALIZED ORBITS AND PARANASAL SINUSES:  Unremarkable      CALVARIUM AND EXTRACRANIAL SOFT TISSUES:  The calvarium is unremarkable  There are numerous soft tissue nodular densities scattered throughout the scalp for which direct inspection and correlation is recommended      IMPRESSION:     1  Limited study due to patient motion  No acute intracranial hemorrhage or transcortical infarction  2   Generalized age-appropriate atrophy, intracranial atherosclerotic disease and mild microangiopathic disease  3   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

## 2018-11-05 NOTE — DISCHARGE SUMMARY
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 with the screw cutting into the acetabulum  · Patient is POD #4 for orthopedic hardware removal from the right hip per Dr Georgie Gottlieb (Orthopedic Surgery)  SURGERY DATE: 11/1/2018     Surgeon(s) and Role:     * Georgie Gottlieb MD - Primary     * Rigoberto Krishnamurthy PA-C - Assisting  no qualified resident available     Preop Diagnosis:  Retained orthopedic hardware [Z96 9], painful with collapsed femoral head and screws cutting into the acetabulum  Decubitus ulcer right posterior superior hip     Post-Op Diagnosis Codes:     * Retained orthopedic hardware [Z96 9], painful hardware  Debridement of decubitus ulcer     Procedure(s) (LRB):  Removal of cannulated screws (Right)    · Incentive spirometry 10 times per hour while awake  · DVT prophylaxis  · The patient was evaluated by physical therapy and occupational therapy during the hospitalization  · Short-term rehabilitation placement was recommended upon discharge  · The patient was discharged to [facility] for short-term rehabilitation     Gait instability   Assessment & Plan    · The patient was evaluated by physical therapy and occupational therapy during the hospitalization  · Short-term rehabilitation placement was recommended upon discharge  · The patient was discharged to [facility] for short-term rehabilitation     Open wound of right hip   Assessment & Plan    · The open wound of the right hip was associated with surrounding cellulitis  · He completed a course of IV antibiotics during the hospitalization  · A bedside wound debridement was performed on 10/30/2018 by orthopedics    · Continue local wound care  Procedure Component Value - Date/Time   Anaerobic culture and Gram stain [[id number]] Collected: 11/01/18 1427   Lab Status: Final result Specimen: Wound from Wound Updated: 11/04/18 1341    Anaerobic Culture No growth   Wound culture and Gram stain [[id number]] Collected: 11/01/18 1427   Lab Status: Final result Specimen: Wound from Wound Updated: 11/04/18 0821    Wound Culture No growth    Gram Stain Result No polys seen     No bacteria seen   Urine culture [[id number]] Collected: 10/29/18 1242   Lab Status: Final result Specimen: Urine from Urine, Clean Catch Updated: 10/30/18 1844    Urine Culture No Growth <1000 cfu/mL   MRSA culture [[id number]] (Normal) Collected: 10/27/18 1123   Lab Status: Final result Specimen: Nares from Nose Updated: 10/29/18 1158    MRSA Culture Only No Methicillin Resistant Staphlyococcus aureus (MRSA) isolated   Blood culture #2 [[id number]] Collected: 10/27/18 0842   Lab Status: Final result Specimen: Blood from Arm, Left Updated: 11/01/18 1501    Blood Culture No Growth After 5 Days  Blood culture #1 [[id number]] Collected: 10/27/18 0840   Lab Status: Final result Specimen: Blood from Arm, Right Updated: 11/01/18 1501    Blood Culture No Growth After 5 Days  Severe protein-calorie malnutrition ([address] )   Assessment & Plan    Malnutrition Findings:   Malnutrition type: Chronic illness  Degree of Malnutrition: Other severe protein calorie malnutrition    BMI Findings: Body mass index is 21 26 kg/m²  · The patient was evaluated by the dietitian/nutritionist during the hospitalization  · Continue the nutritional supplements per the dietitian's recommendations     Abnormal CT scan of head   Assessment & Plan    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  Similar changes are noted over the bilateral cerebellar convexities  The basal cisterns remain patent      VISUALIZED ORBITS AND PARANASAL SINUSES:  Unremarkable      CALVARIUM AND EXTRACRANIAL SOFT TISSUES:  The calvarium is unremarkable  There are numerous soft tissue nodular densities scattered throughout the scalp for which direct inspection and correlation is recommended      IMPRESSION:     1  Limited study due to patient motion  No acute intracranial hemorrhage or transcortical infarction  2   Generalized age-appropriate atrophy, intracranial atherosclerotic disease and mild microangiopathic disease  3   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  4   Numerous nodular scalp soft tissue lesions, recommend direct inspection and clinical correlation  · Outpatient surveillance imaging with his PCP  · Outpatient follow-up with Neurosurgery  · Recheck a CT scan of the head without contrast in 1 month for surveillance imaging       Transaminitis   Assessment & Plan    · His statin was held during the hospitalization  · Restart his statin medication upon discharge  · Avoid all hepatotoxic agents  · Follow the liver function tests as an outpatient with his PCP     Hypoalbuminemia   Assessment & Plan    Malnutrition Findings:   Malnutrition type: Chronic illness  Degree of Malnutrition: Other severe protein calorie malnutrition    BMI Findings: Body mass index is 21 26 kg/m²       · The patient was evaluated by the dietitian/nutritionist during the hospitalization  · Continue the nutritional supplements per the dietitian's recommendations     CKD (chronic kidney disease)   Assessment & Plan    · Baseline creatinine of 1 0-1 5  · Avoid all nephrotoxic agents  · Outpatient follow-up with his PCP in regards to this matter     Essential hypertension   Assessment & Plan    · Continue toprol XL  · Per the patient's son, the patient does not take lisinopril at this time  · Follow the blood pressure trend  · Outpatient follow-up with his PCP in regards to this matter     Elevated creatinine kinase (CK) level  Assessment & Plan  · Secondary to the orthopedic surgical procedure  · Follow the total creatinine kinase (CK) level as an outpatient  · If the creatinine kinase level remains elevated, his statin medication will need to be held again    Lactic acidosis  Assessment & Plan  · Resolved during the hospitalization    Discharging Physician / Practitioner: Christiane Lundberg DO  PCP: Carmela Moura DO  Admission Date: 10/27/18   Discharge Date: 11/05/18      Consultations During Hospital Stay:  · Orthopedic Surgery    Procedures Performed:   SURGERY DATE: 11/1/2018     Surgeon(s) and Role:     * Blas Lam MD - Primary     * Katie Bentley PA-C - Assisting  no qualified resident available     Preop Diagnosis:  Retained orthopedic hardware [Z96 9], painful with collapsed femoral head and screws cutting into the acetabulum  Decubitus ulcer right posterior superior hip     Post-Op Diagnosis Codes:     * Retained orthopedic hardware [Z96 9], painful hardware  Debridement of decubitus ulcer     Procedure(s) (LRB):  Removal of cannulated screws (Right)    Significant Findings / Test Results:   Xr Hip/pelv 1 Vw Right If Performed    Result Date: 11/1/2018  Impression: Fluoroscopic guidance provided for surgical procedure  Please refer to the separate procedure notes for additional details  Workstation performed: [id number]     Xr Hip/pelv 2-3 Vws Right    Result Date: 10/28/2018  Impression: 1  Unchanged appearance of the right femoral head status post surgical fixation  Please see follow-up right hip CT report  Workstation performed: [id number]     Ct Head Wo Contrast    Result Date: 10/28/2018  Impression: 1  Limited study due to patient motion  No acute intracranial hemorrhage or transcortical infarction  2   Generalized age-appropriate atrophy, intracranial atherosclerotic disease and mild microangiopathic disease  3   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  4   Numerous nodular scalp soft tissue lesions, recommend direct inspection and clinical correlation  Workstation performed: [id number]     Ct Hip Right Wo Contrast    Result Date: 10/27/2018  Impression: 1  Soft tissue swelling overlying the right greater trochanter  Reactive or infectious bursitis is present, though associated bursal calcifications suggest chronic nature  No soft tissue gas  2   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  Surgical lag screws now project directly into the joint space and have begun to erode into the acetabulum  3   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  Workstation performed: [id number]        Outpatient Tests Requested:  · CBC with diff , CMP, Mag, Phos in 2 days and in 1 week  · CT scan of the head without contrast in 1 month    Complications:  None    Reason for Admission:  Open wound of the right hip with surrounding cellulitis    Hospital Course:     Nghia Hoffman is a 80 y o  male patient who originally presented to the hospital on 10/27/2018 due to gait instability and an open wound of the right hip  Please see above list of diagnoses and related plan for additional information  Condition at Discharge: stable     Discharge Day Visit / Exam:     Subjective: The patient was seen and examined    The patient is doing well   No chest pain  No shortness of breath  He does continue to complain of right hip pain  Vitals: Blood Pressure: 139/75 (11/05/18 0835)  Pulse: 75 (11/05/18 0835)  Temperature: 98 8 °F (37 1 °C) (11/05/18 0646)  Temp Source: Temporal (11/05/18 0646)  Respirations: 20 (11/05/18 0646)  Height: 5' 10" (177 8 cm) (10/27/18 1154)  Weight - Scale: 67 2 kg (148 lb 2 4 oz) (10/27/18 1154)  SpO2: 96 % (11/05/18 0646)  Exam:   Physical Exam  General:  NAD, awake, alert, no agitation, follows commands  HEENT:  NC/AT, mucous membranes moist  Neck:  Supple, No JVP elevation  CV:  + S1, + S2, RRR  Pulm:  Lung fields are CTA bilaterally  Abd:  Soft, Non-tender, Non-distended  Ext:  No clubbing/cyanosis/edema  Skin:  No rashes, Right lateral hip with wound dressing intact    Discussion with Family:  I updated the patient's son at the bedside, who agreed with the discharge plan  Discharge instructions/Information to patient and family:   See after visit summary for information provided to patient and family  Provisions for Follow-Up Care:  See after visit summary for information related to follow-up care and any pertinent home health orders  Disposition:     [facility] for [facility] rehabilitation        Planned Readmission:  No     Discharge Statement:  I spent 35 minutes discharging the patient  This time was spent on the day of discharge  I had direct contact with the patient on the day of discharge  Greater than 50% of the total time was spent examining patient, answering all patient questions, arranging and discussing plan of care with patient as well as directly providing post-discharge instructions  Additional time then spent on discharge activities  Discharge Medications:  See after visit summary for reconciled discharge medications provided to patient and family        ** Please Note: This note has been constructed using a voice recognition system **

## 2018-11-05 NOTE — ASSESSMENT & PLAN NOTE
· His statin was held during the hospitalization  · Restart his statin medication upon discharge  · Avoid all hepatotoxic agents  · Follow the liver function tests as an outpatient with his PCP

## 2018-11-05 NOTE — SOCIAL WORK
5th floor does not have a bed   Spoke with patients son and he would like me to make a referral to [place]   Referral made as requested

## 2018-11-05 NOTE — OCCUPATIONAL THERAPY NOTE
Occupational Therapy Progress Note     Patient Name: Hermila Pedersen  MDXSU'F Date: 11/5/2018  Problem List  Patient Active Problem List   Diagnosis    Bilateral leg edema    Cellulitis    Hx of cardiac murmur    ALEX (acute kidney injury) ([address] )    Elevated brain natriuretic peptide (BNP) level    Oral thrush    Open wound of right hip    Gait instability    Essential hypertension    Hypercholesterolemia    CKD (chronic kidney disease)    Hypoalbuminemia    Transaminitis    Abnormal CT scan of head    Severe protein-calorie malnutrition ([address] )        11/05/18 1005   Restrictions/Precautions   Weight Bearing Precautions Per Order Yes   RLE Weight Bearing Per Order WBAT   Pain Assessment   Pain Assessment 0-10   Pain Score 6   Pain Type Surgical pain   Pain Location Hip   Pain Orientation Right   ADL   Where Assessed Edge of bed   LB Dressing Assistance 2  Maximal Assistance   LB Dressing Deficit Don/doff L sock; Don/doff R sock; Don/doff R shoe;Don/doff L shoe   Toileting Comments pt unable to complete LB dressing due to increased pain   Bed Mobility   Rolling L 4  Minimal assistance   Additional items Assist x 1;Verbal cues;LE management; Increased time required   Supine to Sit 3  Moderate assistance   Additional items Assist x 2; Increased time required; Bedrails;LE management;Verbal cues   Sit to Supine (seated in chair at end of session)   Additional Comments pt on RA throughout session with no complaints of SOB   Transfers   Sit to Stand 4  Minimal assistance   Additional items Assist x 2;Bedrails; Increased time required;Verbal cues  (RW)   Stand to Sit 4  Minimal assistance   Additional items Assist x 2; Increased time required;Verbal cues  (RW)   Additional Comments pt requires increased (A) with functional transfers this date with cues for safety with use of  RW   Functional Mobility   Functional Mobility 4  Minimal assistance   Additional Comments x1 with use of RW; pt requires chair follow due to fatigue and increased pain during task; pt with x2 seated rest breaks with less than 2 min recovery   Additional items Rolling walker   Cognition   Overall Cognitive Status Impaired   Arousal/Participation Alert; Cooperative   Attention Within functional limits   Orientation Level Oriented to person   Memory Decreased long term memory;Decreased short term memory;Decreased recall of recent events   Following Commands Follows one step commands without difficulty   Comments pt consistently asking "what time is it" and "I already had breakfast"   Activity Tolerance   Activity Tolerance Patient limited by fatigue;Patient limited by pain   Assessment   Assessment pt is progressing towards goals during hospital admission; pt with increased distance, however still with difficulties during LB dressing tasks; pt will benefit from continued OT intervention in order to maximize (I) with ADL performance prior to discharge; recommend short term rehab on discharge in order to maximize (I) prior to return home    Plan   Treatment Interventions (continue treatment per POC)   Recommendation   OT Discharge Recommendation Short Term Rehab     Pt will benefit from continued OT services in order to maximize (I) c ADL performance, FM c RW, and improve overall endurance/strength required to complete functional tasks in preparation for d/c  Pt left seated in chair at end of session; all needs within reach; all lines intact

## 2018-11-05 NOTE — ASSESSMENT & PLAN NOTE
· The open wound of the right hip was associated with surrounding cellulitis  · He completed a course of IV antibiotics during the hospitalization  · A bedside wound debridement was performed on 10/30/2018 by orthopedics  · Continue local wound care  Procedure Component Value - Date/Time   Anaerobic culture and Gram stain [[id number]] Collected: 11/01/18 1427   Lab Status: Final result Specimen: Wound from Wound Updated: 11/04/18 1341    Anaerobic Culture No growth   Wound culture and Gram stain [[id number]] Collected: 11/01/18 1427   Lab Status: Final result Specimen: Wound from Wound Updated: 11/04/18 0821    Wound Culture No growth    Gram Stain Result No polys seen     No bacteria seen   Urine culture [[id number]] Collected: 10/29/18 1242   Lab Status: Final result Specimen: Urine from Urine, Clean Catch Updated: 10/30/18 1844    Urine Culture No Growth <1000 cfu/mL   MRSA culture [[id number]] (Normal) Collected: 10/27/18 1123   Lab Status: Final result Specimen: Nares from Nose Updated: 10/29/18 1158    MRSA Culture Only No Methicillin Resistant Staphlyococcus aureus (MRSA) isolated   Blood culture #2 [[id number]] Collected: 10/27/18 0842   Lab Status: Final result Specimen: Blood from Arm, Left Updated: 11/01/18 1501    Blood Culture No Growth After 5 Days  Blood culture #1 [[id number]] Collected: 10/27/18 0840   Lab Status: Final result Specimen: Blood from Arm, Right Updated: 11/01/18 1501    Blood Culture No Growth After 5 Days

## 2018-11-06 ENCOUNTER — TELEPHONE (OUTPATIENT)
Dept: OBGYN CLINIC | Facility: HOSPITAL | Age: 83
End: 2018-11-06

## 2018-11-06 NOTE — TELEPHONE ENCOUNTER
Patient is scheduled  Eveline Johnson was advised  Eveline Johnson is asking for a note stating that they can remove the sutures on 11/12/18  Eveline Johnson is asking that we fax the note to [phone]  Eveline Johnson would like this ahead of time so it doesn't get overlooked & then they can't get it from you once you are away  Please advise

## 2018-11-06 NOTE — TELEPHONE ENCOUNTER
Sutures can be removed any time after the November 12th    Patient can be double booked for appointment

## 2018-11-06 NOTE — TELEPHONE ENCOUNTER
Patient sees Dr Pepper Chandler from [address] [phone]    [facility] Notice called to schedule a post op appt for patient  Patient had sx on 11/1/18  Due to Dr Nat Ordaz being out of the office, please advise that it is ok to schedule this patient on 11/9/18 in the 9:45am same day slot  Cyrus Peacock stated that if the sutures are not ready to come out, they could be given orders to remove them when they feel they are ready  Please advise

## 2018-11-09 ENCOUNTER — OFFICE VISIT (OUTPATIENT)
Dept: OBGYN CLINIC | Facility: CLINIC | Age: 83
End: 2018-11-09

## 2018-11-09 VITALS
HEART RATE: 69 BPM | BODY MASS INDEX: 21.19 KG/M2 | HEIGHT: 70 IN | WEIGHT: 148 LBS | SYSTOLIC BLOOD PRESSURE: 124 MMHG | DIASTOLIC BLOOD PRESSURE: 64 MMHG

## 2018-11-09 DIAGNOSIS — Z48.89 ENCOUNTER FOR POSTOPERATIVE WOUND CHECK: ICD-10-CM

## 2018-11-09 DIAGNOSIS — Z09 POSTOP CHECK: Primary | ICD-10-CM

## 2018-11-09 PROCEDURE — 4040F PNEUMOC VAC/ADMIN/RCVD: CPT | Performed by: ORTHOPAEDIC SURGERY

## 2018-11-09 PROCEDURE — 99024 POSTOP FOLLOW-UP VISIT: CPT | Performed by: ORTHOPAEDIC SURGERY

## 2018-11-09 RX ORDER — SORBITOL SOLUTION 70 %
15 SOLUTION, ORAL MISCELLANEOUS DAILY PRN
COMMUNITY

## 2018-11-09 RX ORDER — TRAMADOL HYDROCHLORIDE 50 MG/1
50 TABLET ORAL EVERY 6 HOURS PRN
COMMUNITY

## 2018-11-09 RX ORDER — ASCORBIC ACID 500 MG
500 TABLET ORAL DAILY
COMMUNITY

## 2018-11-09 RX ORDER — ZINC SULFATE 50(220)MG
220 CAPSULE ORAL DAILY
COMMUNITY

## 2018-11-09 NOTE — PROGRESS NOTES
Chief Complaint  Right hip implant removal and debridement of decubitus ulcer    History Of Presenting Illness  Nghia Hoffman 5/13/1921 presents with patient underwent removal of hip screws November 1st, 2018  Patient also underwent debridement of his decubitus ulcer  Patient presents today for his 1st postop check      Current Medications  Current Outpatient Prescriptions   Medication Sig Dispense Refill    acetaminophen (TYLENOL) 325 mg tablet Take 2 tablets (650 mg total) by mouth every 6 (six) hours as needed for mild pain, headaches or fever  0    ascorbic acid (VITAMIN C) 500 MG tablet Take 500 mg by mouth daily      aspirin 81 MG tablet Take 1 tablet by mouth daily      collagenase (SANTYL) ointment Apply topically daily      docusate sodium (COLACE) 100 mg capsule Take 1 capsule (100 mg total) by mouth 2 (two) times a day  0    Heparin Sodium, Porcine, (HEPARIN, PORCINE,) 5,000 units/mL Inject 1 mL (5,000 Units total) under the skin every 8 (eight) hours 1 mL 0    metoprolol succinate (TOPROL-XL) 25 mg 24 hr tablet Take 1 tablet (25 mg total) by mouth daily  0    pravastatin (PRAVACHOL) 40 mg tablet Take 1 tablet (40 mg total) by mouth daily at bedtime 30 tablet 5    silver sulfadiazine (SILVADENE,SSD) 1 % cream Apply topically daily      sorbitol 70 % solution Take 15 mL by mouth daily as needed      traMADol (ULTRAM) 50 mg tablet Take 50 mg by mouth every 6 (six) hours as needed for moderate pain      zinc sulfate (ZINCATE) 220 mg capsule Take 220 mg by mouth daily       No current facility-administered medications for this visit          Current Problems    Active Problems:   Patient Active Problem List    Diagnosis Date Noted    Severe protein-calorie malnutrition ([facility] [address] ) 11/01/2018    Transaminitis 10/29/2018    Abnormal CT scan of head 10/29/2018    Hypoalbuminemia 10/28/2018    Open wound of right hip 10/27/2018    Gait instability 10/27/2018    Essential hypertension 10/27/2018    Hypercholesterolemia 10/27/2018    CKD (chronic kidney disease) 10/27/2018    Retained orthopedic hardware 10/27/2018    Oral thrush 10/08/2018    Bilateral leg edema 10/05/2018    Cellulitis 10/05/2018    Hx of cardiac murmur 10/05/2018    ALEX (acute kidney injury) ([facility] [address] ) 10/05/2018    Elevated brain natriuretic peptide (BNP) level 10/05/2018         Review of Systems:    General: negative for - chills, fatigue, fever,  weight gain or weight loss      Past Medical History:   Past Medical History:   Diagnosis Date    Arthritis of knee     last assessed 11/7/14, resolved 10/2/17     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 ([facility] [address] )     last assessed 9/9/14, resolved 10/2/17     Avascular necrosis of hip ([address] )     last assessed 8/9/16, resolved 10/2/17     Bursitis of hip, right     last assessed 8/12/16, resolved 10/2/17     Cancer ([facility] [address] )     skin    Chronic kidney disease, stage 3 ([address] )     Closed intracapsular fracture of femur ([address] )     last assessed 8/9/16, resolved 10/2/17    Fracture of right hip ([facility] [address] )     Gout     last assessed 12/24/13, resolved 10/2/17     High cholesterol     Hypertension        Past Surgical History:   Past Surgical History:   Procedure Laterality Date    A-V CARDIAC PACEMAKER INSERTION  10/10/2012    CARDIAC PACEMAKER PLACEMENT  2005    CATARACT EXTRACTION EXTRACAPSULAR W/ INTRAOCULAR LENS IMPLANTATION Bilateral     COLON SURGERY      COLOSTOMY  1982    CORONARY ANGIOPLASTY WITH STENT PLACEMENT      stent 1 Gradient, RLE Anteriorogram Poss Plasty Stent     HEMORRHOID SURGERY      HERNIA REPAIR  1986    bilat inguinal    HIP SURGERY Right 04/17/2014    Pin placed in hip due to fracture Dr Pollard    JOINT REPLACEMENT      R hip    POPLITEAL ARTERY ANGIOPLASTY      Femoral - Popliteal     NH REMOVAL OF HIP PROSTHESIS Right 11/1/2018    Procedure: Removal of cannulated screws;  Surgeon: Vj Rogel MD;  Location: [facility];  Service: Orthopedics    REVISION COLOSTOMY  1983    SKIN CANCER EXCISION      WRIST FRACTURE SURGERY  11/02/2011       Family History:  Family history reviewed and non-contributory  Family History   Problem Relation Age of Onset    No Known Problems Mother     Diabetes type II Father        Social History:  Social History     Social History    Marital status: /Civil Union     Spouse name: N/A    Number of children: N/A    Years of education: N/A     Occupational History    retired       Social History Main Topics    Smoking status: Former Smoker    Smokeless tobacco: Never Used    Alcohol use No    Drug use: No    Sexual activity: Not Currently     Other Topics Concern    None     Social History Narrative    Has living will           Allergies:   No Known Allergies        Physical ExaminationBP 124/64   Pulse 69   Ht 5' 10" (1 778 m)   Wt 67 1 kg (148 lb)   BMI 21 24 kg/m²   Gen: Alert and oriented to person, place, time  HEENT: EOMI, eyes clear, moist mucus membranes, hearing intact      Orthopedic Exam  Right hip operative incisions healed sutures removed Steri-Strips applied  Decubitus ulcer is really fairly clean redressed          Impression  Right hip arthritis secondary to avascular necrosis and femoral head collapse status post hardware removal  Decubitus ulcer right hip          Plan    Continue dressings for the right hip decubiti ulcer  Patient can be referred to the wound Care Clinic  Patient might need a referral to Plastic surgery  Follow-up in 4 weeks in my office x-ray right hip on arrival  Patient ideally needs a total hip arthroplasty but is not a candidate due to his poor nutrition and advanced age and decubitus ulcer  Patient and family aware    Luciana Pop MD        Portions of the record may have been created with voice recognition software   Occasional wrong word or "sound a like" substitutions may have occurred due to the inherent limitations of voice recognition software   Read the chart carefully and recognize, using context, where substitutions have occurred

## 2018-11-09 NOTE — UTILIZATION REVIEW
Ursula Hall, RN Registered Nurse Signed   Utilization Review Date of Service: 11/1/2018 11:48 AM         []Hide copied text  Thank you,  [address]  Utilization Review Department  Phone: [phone]; Fax [phone]  ATTENTION: Please call with any questions or concerns to [phone]  and carefully follow the prompts so that you are directed to the right person  Send all requests for admission clinical reviews, approved or denied determinations and any other requests to fax [phone]   All voicemails are confidential    Continued Stay Review     Date: 11/1/18     Vital Signs: /72 (BP Location: Left arm)   Pulse 69   Temp (!) 97 3 °F (36 3 °C) (Temporal)   Resp 18   Ht 5' 10" (1 778 m)   Wt 67 2 kg (148 lb 2 4 oz)   SpO2 97%   BMI 21 26 kg/m²      Medications:   Scheduled Meds:   Current Facility-Administered Medications:  acetaminophen 650 mg Oral Q6H PRN Terrie Lobe, DO     aspirin 81 mg Oral Daily Terrie Lobe, DO     bupivacaine-epinephrine (PF) 20 mL Infiltration Once PRN Shannan Amor MD     cefazolin 1,000 mg Intravenous Once Marlene Curry PA-C     dextrose 25 mL Intravenous PRN Terrie Lobe, DO     docusate sodium 100 mg Oral BID Terrie Lobe, DO     heparin (porcine) 5,000 Units Subcutaneous Q8H [address] Esequiel Harding,      HYDROmorphone 0 2 mg Intravenous Q3H PRN Terrie Lobe, DO     lactated ringers 50 mL/hr Intravenous Continuous Liliana Euceda CRNA Last Rate: 50 mL/hr (11/01/18 0024)   metoprolol succinate 25 mg Oral Daily Esequiel Harding,      OLANZapine 10 mg Oral BID PRN Valery Cantu MD     ondansetron 4 mg Intravenous Q4H PRN Terrie Lobe, DO     oxyCODONE 5 mg Oral Q4H PRN Terrie Lobe, DO        Continuous Infusions:   lactated ringers 50 mL/hr Last Rate: 50 mL/hr (11/01/18 0024)      PRN Meds:   acetaminophen    bupivacaine-epinephrine (PF)    dextrose    HYDROmorphone    OLANZapine    ondansetron   oxyCODONE     Abnormal Labs/Diagnostic Results:      Age/Sex: 80 y o  male      Assessment/Plan: 79 YO MALE WITH OPEN WOUND R HIP, PERSISTENT CELLULITIS  CONTINUE IV ANCEF, BEDSIDE DEBRIDEMENT ON 10/30, CONTINUE LOCAL WOUND CARE, CT R HIP FROM 10/27 SHOWED INFECTIOUS OR REACTIVE BURSITIS AND NECROSIS R PROXIMAL FEMUR   GOING TO OR 11/1/18 FOR REMOVAL OF RETAINED ORTHOPEDIC HARDWARE         Current Patient Status: Inpatient   Certification Statement: The patient will continue to require additional inpatient hospital stay due to planned OR today for hardware removal      Discharge Plan: TO BE DETERMINED                       Andre Leal DO Physician Addendum Hospitalist Discharge Summaries Date of Service: 11/5/2018  2:34 PM         []Hide copied text        Discharge- Isa Faraz 5/13/1921, 80 y o  male MRN: [id number]     Unit/Bed#: 427-01 Encounter: [id number]     Primary Care Provider: Thomas Murrieta DO   Date and time admitted to hospital: 10/27/2018  8:17 AM               * Retained orthopedic hardware   Assessment & Plan     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 with the screw cutting into the acetabulum  · Patient is POD #4 for orthopedic hardware removal from the right hip per Dr Princess Atkinson (Orthopedic Surgery)  SURGERY DATE: 11/1/2018     Surgeon(s) and Role:     * Princess China MD - Primary     * Madisyn Casanova PA-C - Assisting  no qualified resident available     Preop Diagnosis:  Retained orthopedic hardware [Z96 9], painful with collapsed femoral head and screws cutting into the acetabulum  Decubitus ulcer right posterior superior hip     Post-Op Diagnosis Codes:     * Retained orthopedic hardware [Z96 9], painful hardware  Debridement of decubitus ulcer     Procedure(s) (LRB):  Removal of cannulated screws (Right)     · Incentive spirometry 10 times per hour while awake  · DVT prophylaxis  · The patient was evaluated by physical therapy and occupational therapy during the hospitalization  · Short-term rehabilitation placement was recommended upon discharge  · The patient was discharged to [facility] for short-term rehabilitation      Gait instability   Assessment & Plan     · The patient was evaluated by physical therapy and occupational therapy during the hospitalization  · Short-term rehabilitation placement was recommended upon discharge  · The patient was discharged to [facility] for short-term rehabilitation      Open wound of right hip   Assessment & Plan     · The open wound of the right hip was associated with surrounding cellulitis  · He completed a course of IV antibiotics during the hospitalization  · A bedside wound debridement was performed on 10/30/2018 by orthopedics  · Continue local wound care  Procedure Component Value - Date/Time   Anaerobic culture and Gram stain [[id number]] Collected: 11/01/18 1427   Lab Status: Final result Specimen: Wound from Wound Updated: 11/04/18 1341     Anaerobic Culture No growth   Wound culture and Gram stain [[id number]] Collected: 11/01/18 1427   Lab Status: Final result Specimen: Wound from Wound Updated: 11/04/18 0821     Wound Culture No growth     Gram Stain Result No polys seen       No bacteria seen   Urine culture [[id number]] Collected: 10/29/18 1242   Lab Status: Final result Specimen: Urine from Urine, Clean Catch Updated: 10/30/18 1844     Urine Culture No Growth <1000 cfu/mL   MRSA culture [[id number]] (Normal) Collected: 10/27/18 1123   Lab Status: Final result Specimen: Nares from Nose Updated: 10/29/18 1158     MRSA Culture Only No Methicillin Resistant Staphlyococcus aureus (MRSA) isolated   Blood culture #2 [[id number]] Collected: 10/27/18 0842   Lab Status: Final result Specimen: Blood from Arm, Left Updated: 11/01/18 1501     Blood Culture No Growth After 5 Days     Blood culture #1 [[id number]] Collected: 10/27/18 0840   Lab Status: Final result Specimen: Blood from Arm, Right Updated: 11/01/18 1501     Blood Culture No Growth After 5 Days              Severe protein-calorie malnutrition (HCC)   Assessment & Plan     Malnutrition Findings:   Malnutrition type: Chronic illness  Degree of Malnutrition: Other severe protein calorie malnutrition     BMI Findings: Body mass index is 21 26 kg/m²       · The patient was evaluated by the dietitian/nutritionist during the hospitalization  · Continue the nutritional supplements per the dietitian's recommendations      Abnormal CT scan of head   Assessment & Plan     · A CT scan of the head without contrast was completed on 10/28/2018 with the following results/impression:  FINDINGS:     PARENCHYMA:  Within the limitations of the study, there is no acute intracranial hemorrhage or large territorial transcortical infarction   Intracranial atherosclerotic calcifications are noted of the carotid and vertebral arteries   Mild periventricular   and deep cerebral white matter patchy hypoattenuation is suggestive of microangiopathic disease        VENTRICLES AND EXTRA-AXIAL SPACES:  There is mild ventriculomegaly and commensurate sulcal enlargement   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   Similar changes are noted over the bilateral cerebellar convexities     The basal cisterns remain patent      VISUALIZED ORBITS AND PARANASAL SINUSES:  Unremarkable      CALVARIUM AND EXTRACRANIAL SOFT TISSUES:  The calvarium is unremarkable   There are numerous soft tissue nodular densities scattered throughout the scalp for which direct inspection and correlation is recommended      IMPRESSION:     1  Limited study due to patient motion   No acute intracranial hemorrhage or transcortical infarction  2   Generalized age-appropriate atrophy, intracranial atherosclerotic disease and mild microangiopathic disease  3   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    4   Numerous nodular scalp soft tissue lesions, recommend direct inspection and clinical correlation      · Outpatient surveillance imaging with his PCP  · Outpatient follow-up with Neurosurgery  · Recheck a CT scan of the head without contrast in 1 month for surveillance imaging         Transaminitis   Assessment & Plan     · His statin was held during the hospitalization  · Restart his statin medication upon discharge  · Avoid all hepatotoxic agents  · Follow the liver function tests as an outpatient with his PCP      Hypoalbuminemia   Assessment & Plan     Malnutrition Findings:   Malnutrition type: Chronic illness  Degree of Malnutrition: Other severe protein calorie malnutrition     BMI Findings:   Body mass index is 21 26 kg/m²       · The patient was evaluated by the dietitian/nutritionist during the hospitalization  · Continue the nutritional supplements per the dietitian's recommendations      CKD (chronic kidney disease)   Assessment & Plan     · Baseline creatinine of 1 0-1 5  · Avoid all nephrotoxic agents  · Outpatient follow-up with his PCP in regards to this matter      Essential hypertension   Assessment & Plan     · Continue toprol XL  · Per the patient's son, the patient does not take lisinopril at this time  · Follow the blood pressure trend  · Outpatient follow-up with his PCP in regards to this matter      Elevated creatinine kinase (CK) level  Assessment & Plan  · Secondary to the orthopedic surgical procedure  · Follow the total creatinine kinase (CK) level as an outpatient  · If the creatinine kinase level remains elevated, his statin medication will need to be held again     Lactic acidosis  Assessment & Plan  · Resolved during the hospitalization     Discharging Physician / Practitioner: Griselda Vega DO  PCP: Eugene Goss DO  Admission Date: 10/27/18   Discharge Date: 11/05/18        Consultations During Hospital Stay:  · Orthopedic Surgery     Procedures Performed:   SURGERY DATE: 11/1/2018     Surgeon(s) and Role:     * Georgie Gottlieb MD - Primary     * Kristan Cisneros PA-C - Assisting  no qualified resident available     Preop Diagnosis:  Retained orthopedic hardware [Z96 9], painful with collapsed femoral head and screws cutting into the acetabulum  Decubitus ulcer right posterior superior hip     Post-Op Diagnosis Codes:     * Retained orthopedic hardware [Z96 9], painful hardware  Debridement of decubitus ulcer     Procedure(s) (LRB):  Removal of cannulated screws (Right)     Significant Findings / Test Results:   Xr Hip/pelv 1 Vw Right If Performed     Result Date: 11/1/2018  Impression: Fluoroscopic guidance provided for surgical procedure  Please refer to the separate procedure notes for additional details  Workstation performed: [id number]      Xr Hip/pelv 2-3 Vws Right     Result Date: 10/28/2018  Impression: 1  Unchanged appearance of the right femoral head status post surgical fixation  Please see follow-up right hip CT report  Workstation performed: [id number]      Ct Head Wo Contrast     Result Date: 10/28/2018  Impression: 1  Limited study due to patient motion  No acute intracranial hemorrhage or transcortical infarction  2   Generalized age-appropriate atrophy, intracranial atherosclerotic disease and mild microangiopathic disease  3   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  4   Numerous nodular scalp soft tissue lesions, recommend direct inspection and clinical correlation  Workstation performed: [id number]      Ct Hip Right Wo Contrast     Result Date: 10/27/2018  Impression: 1  Soft tissue swelling overlying the right greater trochanter  Reactive or infectious bursitis is present, though associated bursal calcifications suggest chronic nature  No soft tissue gas  2   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  Surgical lag screws now project directly into the joint space and have begun to erode into the acetabulum   3   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  Workstation performed: [id number]         Outpatient Tests Requested:  · CBC with diff , CMP, Mag, Phos in 2 days and in 1 week  · CT scan of the head without contrast in 1 month     Complications:  None     Reason for Admission:  Open wound of the right hip with surrounding cellulitis     Hospital Course:      Matthew Fitzgerald is a 80 y o  male patient who originally presented to the hospital on 10/27/2018 due to gait instability and an open wound of the right hip      Please see above list of diagnoses and related plan for additional information       Condition at Discharge: stable      Discharge Day Visit / Exam:      Subjective: The patient was seen and examined  The patient is doing well  No chest pain  No shortness of breath  He does continue to complain of right hip pain    Vitals: Blood Pressure: 139/75 (11/05/18 0835)  Pulse: 75 (11/05/18 0835)  Temperature: 98 8 °F (37 1 °C) (11/05/18 0646)  Temp Source: Temporal (11/05/18 0646)  Respirations: 20 (11/05/18 0646)  Height: 5' 10" (177 8 cm) (10/27/18 1154)  Weight - Scale: 67 2 kg (148 lb 2 4 oz) (10/27/18 1154)  SpO2: 96 % (11/05/18 0646)  Exam:   Physical Exam  General:  NAD, awake, alert, no agitation, follows commands  HEENT:  NC/AT, mucous membranes moist  Neck:  Supple, No JVP elevation  CV:  + S1, + S2, RRR  Pulm:  Lung fields are CTA bilaterally  Abd:  Soft, Non-tender, Non-distended  Ext:  No clubbing/cyanosis/edema  Skin:  No rashes, Right lateral hip with wound dressing intact     Discussion with Family:  I updated the patient's son at the bedside, who agreed with the discharge plan      Discharge instructions/Information to patient and family:   See after visit summary for information provided to patient and family        Provisions for Follow-Up Care:  See after visit summary for information related to follow-up care and any pertinent home health orders        Disposition:      Jevon Tobin at [facility] for short-term rehabilitation           Planned Readmission:  No     Discharge Statement:  I spent 35 minutes discharging the patient  This time was spent on the day of discharge  I had direct contact with the patient on the day of discharge  Greater than 50% of the total time was spent examining patient, answering all patient questions, arranging and discussing plan of care with patient as well as directly providing post-discharge instructions    Additional time then spent on discharge activities      Discharge Medications:  See after visit summary for reconciled discharge medications provided to patient and family        ** Please Note: This note has been constructed using a voice recognition system **                                          Revision History

## 2018-11-12 NOTE — UTILIZATION REVIEW
Notification of Discharge  This is a Notification of Discharge from our facility [address]  Please be advised that this patient has been discharge from our facility  Below you will find the admission and discharge date and time including the patients disposition  PRESENTATION DATE: 10/27/2018  8:17 AM  IP ADMISSION DATE: 10/27/18 1019  DISCHARGE DATE: 11/5/2018  2:57 PM  DISPOSITION: Non [facility] SNF/TCU/SNU    [address] Utilization Review Department  Phone: [phone]; Fax [phone]  ATTENTION: Please call with any questions or concerns to [phone]  and carefully listen to the prompts so that you are directed to the right person  Send all requests for admission clinical reviews, approved or denied determinations and any other requests to fax [phone]   All voicemails are confidential

## 2018-11-17 NOTE — PROGRESS NOTES
Progress Note - Orthopedics   atilioradha CappsChang 80 y o  male MRN: [id number]  Unit/Bed#: 427-01      Subjective:    80 y  o male with posterior right hip decubitus  Patient notes hip pain is improved  No acute events  Patient doing well  Pain controlled   Denies fevers chills, CP, SOB    Labs:    0  Lab Value Date/Time   HCT 40 5 10/30/2018 0605   HCT 40 5 10/29/2018 0541   HCT 40 3 10/28/2018 0444   HCT 31 0 (L) 06/13/2014 0543   HCT 29 7 (L) 06/11/2014 0612   HCT 28 6 (L) 06/10/2014 0446   HGB 13 0 10/30/2018 0605   HGB 12 8 10/29/2018 0541   HGB 12 5 10/28/2018 0444   HGB 9 6 (L) 06/13/2014 0543   HGB 9 2 (L) 06/11/2014 0612   HGB 8 8 (L) 06/10/2014 0446   INR 1 15 10/05/2018 1139   INR 1 33 (H) 06/09/2014 1323   WBC 7 20 10/30/2018 0605   WBC 7 48 10/29/2018 0541   WBC 6 67 10/28/2018 0444   WBC 7 02 06/13/2014 0543   WBC 7 56 06/11/2014 0612   WBC 10 72 (H) 06/10/2014 0446     Meds:    Current Facility-Administered Medications:     acetaminophen (TYLENOL) tablet 650 mg, 650 mg, Oral, Q6H PRN, Terrie Santamaria DO    aspirin chewable tablet 81 mg, 81 mg, Oral, Daily, Terrie Santamaria DO, 81 mg at 10/30/18 0800    bupivacaine-epinephrine (PF) (MARCAINE/EPINEPHRINE PF) 0 25 %-1:[id number] injection 20 mL, 20 mL, Infiltration, Once PRN, Shannan Amor MD    ceFAZolin (ANCEF) IVPB (premix) 1,000 mg, 1,000 mg, Intravenous, Q12H, Terrie Santamaria DO, Last Rate: 100 mL/hr at 10/31/18 0020, 1,000 mg at 10/31/18 0020    dextrose 50 % IV solution 25 mL, 25 mL, Intravenous, PRN, Terrie Santamaria DO    docusate sodium (COLACE) capsule 100 mg, 100 mg, Oral, BID, Terrie Santamaria DO, 100 mg at 10/30/18 0800    heparin (porcine) subcutaneous injection 5,000 Units, 5,000 Units, Subcutaneous, Q8H [facility], Terrie Santamaria DO, 5,000 Units at 10/31/18 0609    HYDROmorphone (DILAUDID) injection 0 2 mg, 0 2 mg, Intravenous, Q3H PRN, Terrie Santamaria DO, 0 2 mg at 10/27/18 1329    metoprolol succinate (TOPROL-XL) 24 hr tablet 25 mg, 25 mg, Oral, Daily, [facility], DO, 25 mg at 10/30/18 0800    OLANZapine (ZyPREXA ZYDIS) dispersible tablet 10 mg, 10 mg, Oral, BID PRN, Fatemeh Bonilla MD    ondansetron (ZOFRAN) injection 4 mg, 4 mg, Intravenous, Q4H PRN, [facility], DO    oxyCODONE (ROXICODONE) IR tablet 5 mg, 5 mg, Oral, Q4H PRN, [facility], DO    Blood Culture:   Lab Results   Component Value Date    BLOODCX No Growth at 72 hrs  10/27/2018     Wound Culture:   No results found for: WOUNDCULT    Ins and Outs:  I/O last 24 hours: In: 9732 5 [P O :360; I V :997 5; IV Piggyback:8375]  Out: 550 [Urine:550]    Physical:  Vitals:    10/31/18 0741   BP: 140/75   Pulse: 75   Resp: 18   Temp: (!) 97 4 °F (36 3 °C)   SpO2: 97%     Musculoskeletal: right Lower Extremity  · Skin with posterior right hip decubitus that underwent bedside debridement yesterday by Dr Ailin Mchugh  The base is approximately 2 5 cm with very minimal necrotic tissue with the surrounding edges with granulation  Old surgical incisions from screw placement our anterior and slightly inferior to the wound and show no erythema in this area  · The superficial edge is smaller than yesterday and was approximately 4-5 cm without surrounding erythema but mild swelling, again this is occluding the previous screw incision site scar  · Patient has no pain with simple log rolling in bed but does with hip flexion which produces pain in the anterior hip joint concurrent with screw eruption  · Dressings showed minimal bloody discharge but no gross purulence and were changed today  · TTP over round the superficial layer extending approximately 1 cm beyond the edges of the wound  · Sensation is intact distally  · Patient's mentation is much better today than yesterday  Assessment:    80 y  o male with 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 now with screw a rock shin through the head and posterior decubiti right hip  Plan:  · Patient was seen at the bedside with Dr Jamil First and treatment was discussed  At this point he should be able to be handled with local wound care likely [place] ointment to the area however Dr Green Ahr discussed this with wound care  Is not appear to need general surgery evaluation at this point time  Dr Katalina Sharp will ask the patient if he would like his son involved with decision making and he said yes and Dr Green Ahr be contacting his son later  Plan is for Dr Ailin Mchugh to do screw removal from the right hip tomorrow in the OR with x-ray  Patient would need to be NPO after midnight  Fiona Mchugh needs to discuss procedure with patient son and get consent in conjunction with the patient  Patient will stop around the clock antibiotics  · PT/OT for partial limited weight-bearing right hip  · Pain control  · Reposition patient in bed every 2 hours to offload pressure on right posterior hip    · DVT ppx  · Dispo: Ortho will follow    Dorcas Parker PA-C Home/with Baby

## 2018-11-21 ENCOUNTER — PATIENT OUTREACH (OUTPATIENT)
Dept: FAMILY MEDICINE CLINIC | Facility: CLINIC | Age: 83
End: 2018-11-21

## 2018-11-21 NOTE — PROGRESS NOTES
spoke with Lazara Sanders at [facility] pt now long term there   Will close from case management due to long term care status

## 2018-12-17 NOTE — PROGRESS NOTES
Progress Note Ronit Yee 5/13/1921, 80 y o  male MRN: [id number]  Unit/Bed#: 427-01 Encounter: [id number]  Primary Care Provider: Ngoc Hamilton DO   Date and time admitted to hospital: 10/27/2018  8:17 AM    * Open wound of right hip   Assessment & Plan    · POA with associated cellulitis  Antibiotic regimen has been completed  · Bedside debridement was performed on 10/30/2018 by orthopedics  · Continue local wound care  Severe protein-calorie malnutrition ([address] )   Assessment & Plan    Malnutrition Findings:   Malnutrition type: Chronic illness  Degree of Malnutrition: Other severe protein calorie malnutrition    BMI Findings: Body mass index is 21 26 kg/m²  · Continue nutritional supplements  Retained orthopedic hardware   Assessment & Plan    · 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  · Patient is POD #3 for orthopedic hardware removal from right hip per orthopedics  · Pain control  · Rehab placement  CKD (chronic kidney disease)   Assessment & Plan    · Baseline creatinine 1 0-1 5     Essential hypertension   Assessment & Plan    · Well controlled on Toprol XL  · Per the patient's son, the patient does not take lisinopril at this time  · Follow the blood pressure trend       VTE Pharmacologic Prophylaxis:   Pharmacologic: Heparin  Mechanical: Mechanical VTE prophylaxis in place  Patient Centered Rounds: I have performed bedside rounds with nursing staff today  Discussions with Specialists or Other Care Team Provider: None  Education and Discussions with Family / Patient: All patient questions answered to the best of my ability  Time Spent for Care: 20 minutes  More than 50% of total time spent on counseling and coordination of care as described above      Current Length of Stay: 8 day(s)  Current Patient Status: Inpatient   Certification Statement: The patient will continue to require additional inpatient hospital stay due to Refilled patients medication, left a detailed message informing patient he needs to schedule his schedule his physical for any further refills.   rehab placement  Discharge Plan: anticipate discharge tomorrow to the 5th floor rehab if bed is available  Otherwise, patient could go to [facility]  Code Status: Level 1 - Full Code    Subjective:   Patient is complaining of some pain in the right hip and asking for pain medication  Otherwise, he is doing well without complaints  Objective:   Vitals:   Temp (24hrs), Av 1 °F (36 7 °C), Min:97 6 °F (36 4 °C), Max:98 6 °F (37 °C)    Temp:  [97 6 °F (36 4 °C)-98 6 °F (37 °C)] 97 6 °F (36 4 °C)  HR:  [70-74] 74  Resp:  [18] 18  BP: (146-162)/(67-70) 146/69  SpO2:  [94 %-98 %] 97 %  Body mass index is 21 26 kg/m²  Input and Output Summary (last 24 hours): Intake/Output Summary (Last 24 hours) at 18 1306  Last data filed at 18 0903   Gross per 24 hour   Intake              340 ml   Output              200 ml   Net              140 ml       Physical Exam:     Physical Exam   HENT:   Head: Normocephalic and atraumatic  Mouth/Throat: Oropharynx is clear and moist and mucous membranes are normal    Eyes: No scleral icterus  Cardiovascular: Normal rate and regular rhythm  No murmur heard  Pulmonary/Chest: Breath sounds normal  He has no wheezes  He has no rales  He exhibits no tenderness  Abdominal: Soft  Bowel sounds are normal  He exhibits no distension  There is no tenderness  Musculoskeletal: He exhibits no edema  Right proximal, lateral hip is examined  Surgical dressing is applied and intact  Wound was not visible seen  Skin: Skin is warm and dry  No rash noted  Innumerable hyperkeratotic skin lesions on his scalp  Psychiatric: He has a normal mood and affect  Vitals reviewed      Additional Data:   Labs:    Results from last 7 days  Lab Units 18  0505   WBC Thousand/uL 5 95   HEMOGLOBIN g/dL 11 8*   HEMATOCRIT % 37 8   PLATELETS Thousands/uL 231   NEUTROS PCT % 68   LYMPHS PCT % 14   MONOS PCT % 14*   EOS PCT % 3       Results from last 7 days  Lab Units 11/02/18  0505 10/30/18  0605   POTASSIUM mmol/L 3 9 3 4*   CHLORIDE mmol/L 109* 108   CO2 mmol/L 27 22   BUN mg/dL 26* 19   CREATININE mg/dL 1 09 1 19   CALCIUM mg/dL 8 5 8 3   ALK PHOS U/L  --  92   ALT U/L  --  6*   AST U/L  --  47*           * I Have Reviewed All Lab Data Listed Above  * Additional Pertinent Lab Tests Reviewed: All Labs Within Last 24 Hours Reviewed    Imaging:    Imaging Reports Reviewed Today Include: None new    Cultures:   Blood Culture:   Lab Results   Component Value Date    BLOODCX No Growth After 5 Days  10/27/2018    BLOODCX No Growth After 5 Days  10/27/2018     Urine Culture:   Lab Results   Component Value Date    URINECX No Growth <1000 cfu/mL 10/29/2018    URINECX No Growth <1000 cfu/mL 08/09/2016     Sputum Culture: No components found for: SPUTUMCX  Wound Culture:   Lab Results   Component Value Date    WOUNDCULT No growth 11/01/2018       Last 24 Hours Medication List:     Current Facility-Administered Medications:  acetaminophen 650 mg Oral Q6H PRN Cheyenne Cross, DO   aspirin 81 mg Oral Daily Cheyenne Cross DO   bupivacaine-epinephrine (PF) 20 mL Infiltration Once PRN Ricardo Benitez MD   dextrose 25 mL Intravenous PRN Cheyenne Cross, DO   docusate sodium 100 mg Oral BID Cheyenne Cross,    heparin (porcine) 5,000 Units Subcutaneous Q8H [facility] Esequiel Harding,    HYDROmorphone 0 2 mg Intravenous Q3H PRN Cheyenne Cross, DO   metoprolol succinate 25 mg Oral Daily Cheyenne Cross, DO   OLANZapine 10 mg Oral BID PRN Billy Jj MD   ondansetron 4 mg Intravenous Q4H PRN Cheyenne Cross, DO   oxyCODONE 5 mg Oral Q4H PRN Cheyenne Cross DO        Today, Patient Was Seen By: Avel Varela PA-C    ** Please Note: Dragon 360 Dictation voice to text software may have been used in the creation of this document   **

## 2022-04-07 NOTE — NURSING NOTE
FALLON Mejía for colonoscopy on 06/01/2022  arrive at 11am   . Gave Prep instructions in office. ----miralax    Advised PT  that  will call with final arrival time  24 hrs before procedure. If they do not get a phone call, arrival time will stay the same as given on instructions     Instructions reviewed with patient and son, both of whom verbalized understanding  Patient offered no complaints at time of d/c

## 2023-05-07 NOTE — PROGRESS NOTES
Dr Mikel Clemons made aware patient's pupils are unequal   R pupil is a 3mm and L pupil is pinpiont  fall

## 2024-03-23 NOTE — PHYSICAL THERAPY NOTE
PT Treatment Note      10/29/18 1257   Restrictions/Precautions   Weight Bearing Precautions Per Order No   Other Precautions (Contact/isolation; Chair Alarm; Bed Alarm;Multiple lines;Telem)   Cognition   Overall Cognitive Status Impaired   Arousal/Participation Alert   Attention Attends with cues to redirect   Following Commands Follows one step commands with increased time or repetition   Subjective   Subjective Agreeable to therapy  Bed Mobility   Supine to Sit 3  Moderate assistance   Additional items Assist x 2;HOB elevated; Bedrails; Increased time required;Verbal cues;LE management   Transfers   Sit to Stand 4  Minimal assistance   Additional items Assist x 2; Increased time required;Verbal cues  (posterior lean upon standing  Assist to correct)   Stand to Sit 4  Minimal assistance   Additional items Assist x 2; Increased time required;Verbal cues   Ambulation/Elevation   Gait pattern Antalgic; Foward flexed   Gait Assistance 4  Minimal assist   Additional items Assist x 2;Verbal cues   Assistive Device Rolling walker   Distance 30'   Balance   Static Sitting Fair +   Dynamic Sitting Fair +   Static Standing Fair   Dynamic Standing [address]  (with RW)   Endurance Deficit   Endurance Deficit Yes   Activity Tolerance   Activity Tolerance Patient limited by fatigue;Patient limited by pain   Assessment   Prognosis Good   Problem List Decreased strength;Decreased endurance; Impaired balance;Decreased mobility; Decreased safety awareness;Decreased cognition; Impaired judgement   Assessment Pt  performing bed mobility, tx/ambulation at (mod-min) x 2  Increased time to complete tasks  Limited ambulation 2* weakness and pain  Unsteady gait  He would benefit from PT to help improve strength, ROM, balance, safety, and functional activity tolerance  Plan   Treatment/Interventions Functional transfer training;LE strengthening/ROM; Therapeutic exercise; Endurance training;Bed mobility;Gait training   Progress Slow progress, decreased activity tolerance   Recommendation   Recommendation Short-term skilled PT   Pt  OOB in chair with call bell within reach, scd's connected and turned on and alarm on at end of PT session  Discussed with Tony Casarez, PT today's treatment and patient's current level of function for care coordination  General

## (undated) DEVICE — GLOVE SRG BIOGEL 8

## (undated) DEVICE — DRAPE C-ARM X-RAY

## (undated) DEVICE — HEAVY DUTY TABLE COVER: Brand: CONVERTORS

## (undated) DEVICE — 3M™ IOBAN™ 2 ANTIMICROBIAL INCISE DRAPE 6648EZ: Brand: IOBAN™ 2

## (undated) DEVICE — 2.8MM THREADED GUIDE WIRE 450MM/TROCAR POINT/300MM CALIB

## (undated) DEVICE — WEBRIL 6 IN UNSTERILE

## (undated) DEVICE — DRESSING MEPILEX AG BORDER 4 X 8 IN

## (undated) DEVICE — ELECTRODE EXTENDED BLADE 162CM

## (undated) DEVICE — PLUMEPEN PRO 10FT

## (undated) DEVICE — GLOVE INDICATOR PI UNDERGLOVE SZ 8 BLUE

## (undated) DEVICE — LIGHT GLOVE GREEN

## (undated) DEVICE — NEEDLE HYPO 2G X 1 IN

## (undated) DEVICE — SURGICAL GOWN, XL SMARTSLEEVE: Brand: CONVERTORS

## (undated) DEVICE — CHLORAPREP HI-LITE 26ML ORANGE

## (undated) DEVICE — ABDOMINAL PAD: Brand: DERMACEA

## (undated) DEVICE — NEEDLE SPINAL18G X 3.5 IN QUINCKE

## (undated) DEVICE — SUT VICRYL 2-0 CP-1 27 IN J266H

## (undated) DEVICE — BULB SYRINGE,IRRIGATION WITH PROTECTIVE CAP: Brand: DOVER

## (undated) DEVICE — INTENDED FOR TISSUE SEPARATION, AND OTHER PROCEDURES THAT REQUIRE A SHARP SURGICAL BLADE TO PUNCTURE OR CUT.: Brand: BARD-PARKER ® CARBON RIB-BACK BLADES

## (undated) DEVICE — GAUZE SPONGES,16 PLY: Brand: CURITY

## (undated) DEVICE — 3000CC GUARDIAN II: Brand: GUARDIAN

## (undated) DEVICE — SUT VICRYL 1 CTX 36 IN J977H

## (undated) DEVICE — ADHESIVE SKN CLSR HISTOACRYL FLEX 0.5ML LF

## (undated) DEVICE — DRAPE TOWEL: Brand: CONVERTORS